# Patient Record
Sex: FEMALE | Race: WHITE | Employment: FULL TIME | URBAN - METROPOLITAN AREA
[De-identification: names, ages, dates, MRNs, and addresses within clinical notes are randomized per-mention and may not be internally consistent; named-entity substitution may affect disease eponyms.]

---

## 2017-05-01 ENCOUNTER — HOSPITAL ENCOUNTER (EMERGENCY)
Facility: HOSPITAL | Age: 32
Discharge: HOME | End: 2017-05-01
Attending: EMERGENCY MEDICINE
Payer: COMMERCIAL

## 2017-05-01 VITALS
WEIGHT: 228 LBS | HEART RATE: 86 BPM | TEMPERATURE: 98 F | OXYGEN SATURATION: 99 % | RESPIRATION RATE: 16 BRPM | HEIGHT: 68 IN | SYSTOLIC BLOOD PRESSURE: 136 MMHG | BODY MASS INDEX: 34.56 KG/M2 | DIASTOLIC BLOOD PRESSURE: 88 MMHG

## 2017-05-01 DIAGNOSIS — F32.A DEPRESSION, UNSPECIFIED DEPRESSION TYPE: Primary | ICD-10-CM

## 2017-05-01 DIAGNOSIS — F43.9 STRESS: ICD-10-CM

## 2017-05-01 PROCEDURE — 80048 BASIC METABOLIC PNL TOTAL CA: CPT | Performed by: EMERGENCY MEDICINE

## 2017-05-01 PROCEDURE — 99284 EMERGENCY DEPT VISIT MOD MDM: CPT

## 2017-05-01 PROCEDURE — 99285 EMERGENCY DEPT VISIT HI MDM: CPT

## 2017-05-01 PROCEDURE — 36415 COLL VENOUS BLD VENIPUNCTURE: CPT

## 2017-05-01 PROCEDURE — 80307 DRUG TEST PRSMV CHEM ANLYZR: CPT | Performed by: EMERGENCY MEDICINE

## 2017-05-01 PROCEDURE — 81025 URINE PREGNANCY TEST: CPT

## 2017-05-01 PROCEDURE — 80320 DRUG SCREEN QUANTALCOHOLS: CPT | Performed by: EMERGENCY MEDICINE

## 2017-05-01 PROCEDURE — 85025 COMPLETE CBC W/AUTO DIFF WBC: CPT | Performed by: EMERGENCY MEDICINE

## 2017-05-01 RX ORDER — TOPIRAMATE 100 MG/1
100 TABLET, FILM COATED ORAL 2 TIMES DAILY
COMMUNITY
End: 2017-07-11

## 2017-05-01 RX ORDER — ACETAMINOPHEN AND CODEINE PHOSPHATE 300; 30 MG/1; MG/1
1 TABLET ORAL EVERY 4 HOURS PRN
COMMUNITY
End: 2017-07-11

## 2017-05-01 NOTE — ED NOTES
Pt states she has no suicidal plan but is a cutter and cuts herself bilat arms and legs, superficial cuts noted, labs drawn and urine sent, pt has flat affect, warm blankets and pillow given for comfort, water as well, pt denies lunch tray, states \"Im not

## 2017-05-01 NOTE — BH LEVEL OF CARE ASSESSMENT
Level of Care Assessment Note    General Questions  Why are you here?: feeling overwhelemd and down. Precipitating Events: Pt reports pt reports feeling down lately. Pt reports feeling stressed.  Pt reports haivng to give  up yoga classes d/t school and w reports her 2 dogs. Family Pt reports good relationshps with her parents. Pt reports she is lookign forward togoing back to [facility] to see her parents in a few weeks and later in the summer hse is going [place] [facility] [place] her brothers.    Past Suicidal Id to Self Injury: stress, overwhelmed. Object(s) Used: Razor  Area(s) of Body Injured: Arm  Describe Area(s) of Body Injured: forearms. and leg. Frequency: Rarely  Describe Frequency: pt reports 3x this past week/weekand a half.    Severity: Superficial; Symptoms  History of Withdrawal Symptoms: Denies past symptoms  Current Withdrawal Symptoms: No  Process Addiction/ Behaviors  Repetitive/Compulsive Behaviors in the past 30 days: No                Functional Impairment  Currently Attending School: Yes (t to die. Pt reports she i feeling overwhelemd with working 2 jobs and going to school full time. Pt reports  she is looking forward to go to [address] [place] [address] to see her parents and then later in the summer going [place]  [facility] [place] to [place] her brothers.  Pt reports other t

## 2017-05-01 NOTE — ED INITIAL ASSESSMENT (HPI)
Pt states shes not felt herself for the past few weeks, thinking about suicide today but has no plan

## 2017-05-01 NOTE — ED PROVIDER NOTES
Patient Seen in: [facility] Emergency Department    History   Patient presents with:  Eval-P (psychiatric)    Stated Complaint: SI    HPI    Patient states that she has been under tremendous amounts of stress related to being a full-time student and 18   Temp 05/01/17 1153 98.2 °F (36.8 °C)   Temp src 05/01/17 1153 Oral   SpO2 05/01/17 1153 100 %   O2 Device --        Current:/92 mmHg  Pulse 87  Temp(Src) 98.2 °F (36.8 °C) (Oral)  Resp 18  Ht 172.7 cm (5' 8\")  Wt 103.42 kg  BMI 34.68 kg/m2  SpO DIFFERENTIAL[[id number]]                              Final result                 Please view results for these tests on the individual orders.    POCT PREGNANCY, URINE   CBC W/ DIFFERENTIAL       MDM   Patient reports tremendous stress related to work and

## 2017-05-01 NOTE — ED NOTES
Pt states she doesn't have a ride back to her car and wants edward to arrange for a ride for her, she states she has no money to call a cab, Spoke to Wan Duque,  and she will be in to talk with pt

## 2017-07-11 ENCOUNTER — LAB ENCOUNTER (OUTPATIENT)
Dept: LAB | Age: 32
End: 2017-07-11
Attending: FAMILY MEDICINE
Payer: COMMERCIAL

## 2017-07-11 DIAGNOSIS — Z11.3 SCREENING EXAMINATION FOR VENEREAL DISEASE: Primary | ICD-10-CM

## 2017-07-11 PROCEDURE — 80074 ACUTE HEPATITIS PANEL: CPT | Performed by: FAMILY MEDICINE

## 2017-07-11 PROCEDURE — 86780 TREPONEMA PALLIDUM: CPT | Performed by: FAMILY MEDICINE

## 2017-07-11 PROCEDURE — 87661 TRICHOMONAS VAGINALIS AMPLIF: CPT | Performed by: FAMILY MEDICINE

## 2017-07-11 PROCEDURE — 87491 CHLMYD TRACH DNA AMP PROBE: CPT | Performed by: FAMILY MEDICINE

## 2017-07-11 PROCEDURE — 87591 N.GONORRHOEAE DNA AMP PROB: CPT | Performed by: FAMILY MEDICINE

## 2017-07-11 PROCEDURE — 87389 HIV-1 AG W/HIV-1&-2 AB AG IA: CPT

## 2018-04-16 PROBLEM — M51.36 DDD (DEGENERATIVE DISC DISEASE), LUMBAR: Status: ACTIVE | Noted: 2018-04-16

## 2018-04-16 PROBLEM — M51.369 DDD (DEGENERATIVE DISC DISEASE), LUMBAR: Status: ACTIVE | Noted: 2018-04-16

## 2018-04-16 PROBLEM — M54.16 LUMBAR RADICULITIS: Status: ACTIVE | Noted: 2018-04-16

## 2018-04-16 PROBLEM — M47.816 LUMBAR SPONDYLOSIS: Status: ACTIVE | Noted: 2018-04-16

## 2018-04-16 PROBLEM — M51.36 ANNULAR TEAR OF LUMBAR DISC: Status: ACTIVE | Noted: 2018-04-16

## 2018-04-16 PROBLEM — M51.369 ANNULAR TEAR OF LUMBAR DISC: Status: ACTIVE | Noted: 2018-04-16

## 2018-06-19 PROBLEM — Z51.81 ENCOUNTER FOR THERAPEUTIC DRUG MONITORING: Status: ACTIVE | Noted: 2018-06-19

## 2018-06-19 PROBLEM — E66.9 OBESITY (BMI 30-39.9): Status: ACTIVE | Noted: 2018-06-19

## 2018-06-20 PROCEDURE — 82397 CHEMILUMINESCENT ASSAY: CPT | Performed by: INTERNAL MEDICINE

## 2018-06-20 PROCEDURE — 82607 VITAMIN B-12: CPT | Performed by: INTERNAL MEDICINE

## 2018-06-20 PROCEDURE — 86141 C-REACTIVE PROTEIN HS: CPT | Performed by: INTERNAL MEDICINE

## 2018-07-12 PROCEDURE — 87491 CHLMYD TRACH DNA AMP PROBE: CPT | Performed by: FAMILY MEDICINE

## 2018-07-12 PROCEDURE — 87591 N.GONORRHOEAE DNA AMP PROB: CPT | Performed by: FAMILY MEDICINE

## 2018-07-16 PROCEDURE — 86256 FLUORESCENT ANTIBODY TITER: CPT | Performed by: FAMILY MEDICINE

## 2018-07-16 PROCEDURE — 83516 IMMUNOASSAY NONANTIBODY: CPT | Performed by: FAMILY MEDICINE

## 2018-07-16 PROCEDURE — 82784 ASSAY IGA/IGD/IGG/IGM EACH: CPT | Performed by: FAMILY MEDICINE

## 2018-07-17 PROBLEM — R79.89 LOW VITAMIN D LEVEL: Status: ACTIVE | Noted: 2018-07-17

## 2018-07-17 PROBLEM — R79.89 LOW VITAMIN B12 LEVEL: Status: ACTIVE | Noted: 2018-07-17

## 2018-07-17 PROBLEM — E53.8 LOW VITAMIN B12 LEVEL: Status: ACTIVE | Noted: 2018-07-17

## 2018-07-26 PROBLEM — R89.4 ABNORMAL CELIAC ANTIBODY PANEL: Status: ACTIVE | Noted: 2018-07-26

## 2018-07-26 PROBLEM — G89.29 CHRONIC RUQ PAIN: Status: ACTIVE | Noted: 2018-07-26

## 2018-07-26 PROBLEM — R11.0 NAUSEA: Status: ACTIVE | Noted: 2018-07-26

## 2018-07-26 PROBLEM — R10.11 CHRONIC RUQ PAIN: Status: ACTIVE | Noted: 2018-07-26

## 2018-08-13 PROCEDURE — 88305 TISSUE EXAM BY PATHOLOGIST: CPT | Performed by: SPECIALIST

## 2018-10-31 PROBLEM — M51.26 HNP (HERNIATED NUCLEUS PULPOSUS), LUMBAR: Status: ACTIVE | Noted: 2018-10-31
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
## 2019-04-09 ENCOUNTER — HOSPITAL ENCOUNTER (EMERGENCY)
Facility: HOSPITAL | Age: 34
Discharge: HOME | End: 2019-04-09
Attending: EMERGENCY MEDICINE
Payer: COMMERCIAL

## 2019-04-09 VITALS
HEART RATE: 64 BPM | WEIGHT: 192 LBS | HEIGHT: 67 IN | RESPIRATION RATE: 17 BRPM | BODY MASS INDEX: 30.13 KG/M2 | DIASTOLIC BLOOD PRESSURE: 77 MMHG | TEMPERATURE: 98 F | SYSTOLIC BLOOD PRESSURE: 107 MMHG

## 2019-04-09 DIAGNOSIS — M54.16 LUMBAR RADICULOPATHY: Primary | ICD-10-CM

## 2019-04-09 PROCEDURE — 99284 EMERGENCY DEPT VISIT MOD MDM: CPT

## 2019-04-09 PROCEDURE — 96372 THER/PROPH/DIAG INJ SC/IM: CPT

## 2019-04-09 PROCEDURE — 99283 EMERGENCY DEPT VISIT LOW MDM: CPT

## 2019-04-09 PROCEDURE — 81025 URINE PREGNANCY TEST: CPT

## 2019-04-09 RX ORDER — PREDNISONE 20 MG/1
60 TABLET ORAL ONCE
Status: COMPLETED | OUTPATIENT
Start: 2019-04-09 | End: 2019-04-09

## 2019-04-09 RX ORDER — METHYLPREDNISOLONE 4 MG/1
TABLET ORAL
Qty: 1 PACKAGE | Refills: 0 | Status: SHIPPED | OUTPATIENT
Start: 2019-04-09 | End: 2019-04-14

## 2019-04-09 RX ORDER — KETOROLAC TROMETHAMINE 30 MG/ML
60 INJECTION, SOLUTION INTRAMUSCULAR; INTRAVENOUS ONCE
Status: COMPLETED | OUTPATIENT
Start: 2019-04-09 | End: 2019-04-09

## 2019-04-10 NOTE — ED INITIAL ASSESSMENT (HPI)
PT c/o lower back pain. Hx of injury at work resulting in herniated discs.  Pain is worse today with standing, walking, sitting, etc.

## 2019-04-10 NOTE — ED PROVIDER NOTES
Patient Seen in: [facility] Emergency Department    History   Patient presents with:  Back Pain (musculoskeletal)    Stated Complaint: back pain, chronic injury    HPI    This is a 39-year-old female that has chronic back pain from work injury.   Kieran Grimm • REPAIR ROTATOR CUFF,CHRONIC      left           Social History    Tobacco Use      Smoking status: Former Smoker        Packs/day: 0.25        Years: 10.00        Pack years: 2.5        Quit date: 2015        Years since quittin.2      [facility] discharged home in good condition.             Disposition and Plan     Clinical Impression:  Lumbar radiculopathy  (primary encounter diagnosis)    Disposition:  Discharge  4/9/2019  9:25 pm    Follow-up:  Miriam Flores MD  [address] [facility]

## 2019-06-08 PROBLEM — F33.2 MAJOR DEPRESSIVE DISORDER, RECURRENT EPISODE, SEVERE (HCC): Status: ACTIVE | Noted: 2019-06-08

## 2019-07-01 PROBLEM — F41.9 ANXIETY DISORDER: Status: ACTIVE | Noted: 2019-07-01

## 2019-07-11 PROBLEM — T14.91XA SUICIDE ATTEMPT (HCC): Status: ACTIVE | Noted: 2019-07-11

## 2019-07-11 PROCEDURE — 88175 CYTOPATH C/V AUTO FLUID REDO: CPT | Performed by: FAMILY MEDICINE

## 2019-07-11 PROCEDURE — 80074 ACUTE HEPATITIS PANEL: CPT | Performed by: FAMILY MEDICINE

## 2019-07-11 PROCEDURE — 36415 COLL VENOUS BLD VENIPUNCTURE: CPT | Performed by: FAMILY MEDICINE

## 2019-07-11 PROCEDURE — 87624 HPV HI-RISK TYP POOLED RSLT: CPT | Performed by: FAMILY MEDICINE

## 2019-07-11 PROCEDURE — 87389 HIV-1 AG W/HIV-1&-2 AB AG IA: CPT | Performed by: FAMILY MEDICINE

## 2019-07-15 PROBLEM — F33.2 SEVERE EPISODE OF RECURRENT MAJOR DEPRESSIVE DISORDER, WITHOUT PSYCHOTIC FEATURES (HCC): Status: ACTIVE | Noted: 2019-06-08

## 2019-08-06 ENCOUNTER — APPOINTMENT (OUTPATIENT)
Dept: LAB | Age: 34
End: 2019-08-06
Attending: NURSE PRACTITIONER
Payer: COMMERCIAL

## 2019-08-06 DIAGNOSIS — F41.1 GENERALIZED ANXIETY DISORDER: ICD-10-CM

## 2019-08-06 LAB
DEPRECATED RDW RBC AUTO: 42.7 FL (ref 35.1–46.3)
ERYTHROCYTE [DISTWIDTH] IN BLOOD BY AUTOMATED COUNT: 12.4 % (ref 11–15)
HCT VFR BLD AUTO: 38 % (ref 35–48)
HGB BLD-MCNC: 12.4 G/DL (ref 12–16)
MCH RBC QN AUTO: 30.5 PG (ref 26–34)
MCHC RBC AUTO-ENTMCNC: 32.6 G/DL (ref 31–37)
MCV RBC AUTO: 93.6 FL (ref 80–100)
PLATELET # BLD AUTO: 216 10(3)UL (ref 150–450)
RBC # BLD AUTO: 4.06 X10(6)UL (ref 3.8–5.3)
WBC # BLD AUTO: 5.4 X10(3) UL (ref 4–11)

## 2019-08-06 PROCEDURE — 80053 COMPREHEN METABOLIC PANEL: CPT | Performed by: NURSE PRACTITIONER

## 2019-08-06 PROCEDURE — 85027 COMPLETE CBC AUTOMATED: CPT

## 2019-08-06 PROCEDURE — 82306 VITAMIN D 25 HYDROXY: CPT | Performed by: NURSE PRACTITIONER

## 2020-06-29 ENCOUNTER — OFFICE VISIT (OUTPATIENT)
Dept: FAMILY MEDICINE CLINIC | Facility: CLINIC | Age: 35
End: 2020-06-29

## 2020-06-29 VITALS
OXYGEN SATURATION: 98 % | HEIGHT: 67 IN | SYSTOLIC BLOOD PRESSURE: 136 MMHG | DIASTOLIC BLOOD PRESSURE: 74 MMHG | WEIGHT: 250 LBS | TEMPERATURE: 97 F | RESPIRATION RATE: 16 BRPM | HEART RATE: 103 BPM | BODY MASS INDEX: 39.24 KG/M2

## 2020-06-29 DIAGNOSIS — M54.9 MID BACK PAIN ON LEFT SIDE: ICD-10-CM

## 2020-06-29 DIAGNOSIS — Z71.6 ENCOUNTER FOR SMOKING CESSATION COUNSELING: ICD-10-CM

## 2020-06-29 DIAGNOSIS — E28.39 PRIMARY OVARIAN FAILURE: ICD-10-CM

## 2020-06-29 DIAGNOSIS — IMO0002 CHRONIC MIGRAINE: Primary | ICD-10-CM

## 2020-06-29 PROCEDURE — 99203 OFFICE O/P NEW LOW 30 MIN: CPT | Performed by: PHYSICIAN ASSISTANT

## 2020-06-29 RX ORDER — METHYLPREDNISOLONE 4 MG/1
TABLET ORAL
Qty: 1 KIT | Refills: 0 | Status: SHIPPED | OUTPATIENT
Start: 2020-06-29 | End: 2021-02-01

## 2020-06-29 RX ORDER — BUTALBITAL, ACETAMINOPHEN AND CAFFEINE 50; 325; 40 MG/1; MG/1; MG/1
1 CAPSULE ORAL EVERY 4 HOURS PRN
Qty: 15 CAPSULE | Refills: 0 | Status: SHIPPED | OUTPATIENT
Start: 2020-06-29 | End: 2021-03-09 | Stop reason: CLARIF

## 2020-06-29 RX ORDER — NORETHINDRONE ACETATE AND ETHINYL ESTRADIOL 1MG-20(21)
1 KIT ORAL DAILY
Qty: 3 PACKAGE | Refills: 3 | Status: ON HOLD | OUTPATIENT
Start: 2020-06-29 | End: 2021-03-19

## 2020-06-29 RX ORDER — DIVALPROEX SODIUM 500 MG/1
500 TABLET, DELAYED RELEASE ORAL DAILY
Qty: 30 TABLET | Refills: 2 | Status: SHIPPED | OUTPATIENT
Start: 2020-06-29 | End: 2021-03-17 | Stop reason: CLARIF

## 2020-06-29 NOTE — PROGRESS NOTES
HPI:    Patient ID: Letty Rodriguez is a 37year old female. HPI   Patient presents today to establish care. Was previously receiving care at [address] [place] [address]. Her PCP recently retired.       PMHx: migraines, primary ovarian failure age 14, Genitourinary: Positive for menstrual problem. Negative for dysuria, frequency and hematuria. Musculoskeletal: Positive for back pain (left mid back, deep to the muscles and bones). Negative for myalgias, joint pain and gait problem.    Skin: Negative f Runny nose, OTHER (SEE COMMENTS)    Comment:Sinus congestion  Sumatriptan             NAUSEA AND VOMITING  Tramadol                NAUSEA AND VOMITING, OTHER (SEE                            COMMENTS)    Comment:Migraine  Wellbutrin Xl [[place]*    RASH   PHYS cessation counseling  Restart Chantix as directed. 4. Mid back pain on left side  Suspect her symptoms are secondary to pleurisy, no evidence of a musculoskeletal source. She is recovering from COVID infection she had recently.   Trial of Medrol Dosepak

## 2020-07-10 ENCOUNTER — APPOINTMENT (OUTPATIENT)
Dept: CT IMAGING | Age: 35
End: 2020-07-10
Attending: EMERGENCY MEDICINE

## 2020-07-10 ENCOUNTER — HOSPITAL ENCOUNTER (EMERGENCY)
Age: 35
Discharge: HOME | End: 2020-07-10
Attending: EMERGENCY MEDICINE

## 2020-07-10 ENCOUNTER — APPOINTMENT (OUTPATIENT)
Dept: GENERAL RADIOLOGY | Age: 35
End: 2020-07-10
Attending: EMERGENCY MEDICINE

## 2020-07-10 VITALS
BODY MASS INDEX: 49.51 KG/M2 | RESPIRATION RATE: 16 BRPM | SYSTOLIC BLOOD PRESSURE: 127 MMHG | OXYGEN SATURATION: 99 % | HEART RATE: 78 BPM | TEMPERATURE: 98.1 F | DIASTOLIC BLOOD PRESSURE: 68 MMHG | WEIGHT: 293 LBS

## 2020-07-10 DIAGNOSIS — R07.9 CHEST PAIN, UNSPECIFIED TYPE: Primary | ICD-10-CM

## 2020-07-10 DIAGNOSIS — R00.2 PALPITATIONS: ICD-10-CM

## 2020-07-10 LAB
ANION GAP SERPL CALC-SCNC: 12 MMOL/L (ref 10–20)
APTT PPP: 28 SEC (ref 22–32)
BASOPHILS # BLD: 0 K/MCL (ref 0–0.3)
BASOPHILS NFR BLD: 0 %
BUN SERPL-MCNC: 12 MG/DL (ref 6–20)
BUN/CREAT SERPL: 21 (ref 7–25)
CALCIUM SERPL-MCNC: 8.6 MG/DL (ref 8.4–10.2)
CHLORIDE SERPL-SCNC: 108 MMOL/L (ref 98–107)
CO2 SERPL-SCNC: 23 MMOL/L (ref 21–32)
CREAT SERPL-MCNC: 0.57 MG/DL (ref 0.51–0.95)
D DIMER PPP FEU-MCNC: 0.52 MG/L (FEU)
DIFFERENTIAL METHOD BLD: ABNORMAL
EOSINOPHIL # BLD: 0.5 K/MCL (ref 0.1–0.5)
EOSINOPHIL NFR BLD: 7 %
ERYTHROCYTE [DISTWIDTH] IN BLOOD: 14.7 % (ref 11–15)
GLUCOSE SERPL-MCNC: 89 MG/DL (ref 65–99)
HCT VFR BLD CALC: 34.7 % (ref 36–46.5)
HGB BLD-MCNC: 11.3 G/DL (ref 12–15.5)
IMM GRANULOCYTES # BLD AUTO: 0 K/MCL (ref 0–0.2)
IMM GRANULOCYTES NFR BLD: 1 %
INR PPP: 1
LYMPHOCYTES # BLD: 1.8 K/MCL (ref 1–4.8)
LYMPHOCYTES NFR BLD: 22 %
MAGNESIUM SERPL-MCNC: 2.4 MG/DL (ref 1.7–2.4)
MCH RBC QN AUTO: 28.1 PG (ref 26–34)
MCHC RBC AUTO-ENTMCNC: 32.6 G/DL (ref 32–36.5)
MCV RBC AUTO: 86.3 FL (ref 78–100)
MONOCYTES # BLD: 0.6 K/MCL (ref 0.3–0.9)
MONOCYTES NFR BLD: 7 %
NEUTROPHILS # BLD: 5 K/MCL (ref 1.8–7.7)
NEUTROPHILS NFR BLD: 63 %
NRBC BLD MANUAL-RTO: 0 /100 WBC
NT-PROBNP SERPL-MCNC: 75 PG/ML
PLATELET # BLD: 219 K/MCL (ref 140–450)
POTASSIUM SERPL-SCNC: 4.1 MMOL/L (ref 3.4–5.1)
PROTHROMBIN TIME: 10.6 SEC (ref 9.7–11.8)
RBC # BLD: 4.02 MIL/MCL (ref 4–5.2)
SODIUM SERPL-SCNC: 139 MMOL/L (ref 135–145)
TROPONIN I SERPL HS-MCNC: <0.02 NG/ML
TROPONIN I SERPL HS-MCNC: <0.02 NG/ML
WBC # BLD: 8 K/MCL (ref 4.2–11)

## 2020-07-10 PROCEDURE — 84484 ASSAY OF TROPONIN QUANT: CPT

## 2020-07-10 PROCEDURE — 83880 ASSAY OF NATRIURETIC PEPTIDE: CPT

## 2020-07-10 PROCEDURE — 71046 X-RAY EXAM CHEST 2 VIEWS: CPT

## 2020-07-10 PROCEDURE — 71275 CT ANGIOGRAPHY CHEST: CPT

## 2020-07-10 PROCEDURE — 85610 PROTHROMBIN TIME: CPT

## 2020-07-10 PROCEDURE — 83735 ASSAY OF MAGNESIUM: CPT

## 2020-07-10 PROCEDURE — 80048 BASIC METABOLIC PNL TOTAL CA: CPT

## 2020-07-10 PROCEDURE — 99285 EMERGENCY DEPT VISIT HI MDM: CPT

## 2020-07-10 PROCEDURE — 93005 ELECTROCARDIOGRAM TRACING: CPT | Performed by: EMERGENCY MEDICINE

## 2020-07-10 PROCEDURE — 85730 THROMBOPLASTIN TIME PARTIAL: CPT

## 2020-07-10 PROCEDURE — 36415 COLL VENOUS BLD VENIPUNCTURE: CPT

## 2020-07-10 PROCEDURE — 10002805 HB CONTRAST AGENT: Performed by: EMERGENCY MEDICINE

## 2020-07-10 PROCEDURE — 85379 FIBRIN DEGRADATION QUANT: CPT

## 2020-07-10 PROCEDURE — 85025 COMPLETE CBC W/AUTO DIFF WBC: CPT

## 2020-07-10 RX ORDER — HYDROXYZINE HYDROCHLORIDE 25 MG/1
25 TABLET, FILM COATED ORAL 3 TIMES DAILY PRN
COMMUNITY
Start: 2020-04-12

## 2020-07-10 RX ORDER — TRAZODONE HYDROCHLORIDE 150 MG/1
150 TABLET ORAL NIGHTLY PRN
COMMUNITY
Start: 2020-06-30

## 2020-07-10 RX ORDER — FLUOXETINE HYDROCHLORIDE 40 MG/1
40 CAPSULE ORAL DAILY
COMMUNITY
Start: 2020-04-12

## 2020-07-10 RX ORDER — BUTALBITAL, ACETAMINOPHEN AND CAFFEINE 50; 325; 40 MG/1; MG/1; MG/1
1 CAPSULE ORAL EVERY 4 HOURS PRN
COMMUNITY
Start: 2020-04-20

## 2020-07-10 RX ADMIN — IOHEXOL 65 ML: 350 INJECTION, SOLUTION INTRAVENOUS at 19:45

## 2020-07-10 ASSESSMENT — PAIN SCALES - GENERAL
PAINLEVEL_OUTOF10: 4
PAINLEVEL_OUTOF10: 0

## 2020-07-10 ASSESSMENT — PAIN DESCRIPTION - PAIN TYPE: TYPE: ACUTE PAIN

## 2020-07-11 LAB
ATRIAL RATE (BPM): 84
P AXIS (DEGREES): 24
PR-INTERVAL (MSEC): 128
QRS-INTERVAL (MSEC): 82
QT-INTERVAL (MSEC): 392
QTC: 463
R AXIS (DEGREES): 11
REPORT TEXT: NORMAL
T AXIS (DEGREES): 35
VENTRICULAR RATE EKG/MIN (BPM): 84

## 2020-07-11 ASSESSMENT — ENCOUNTER SYMPTOMS
WEAKNESS: 0
HALLUCINATIONS: 0
NUMBNESS: 0
WHEEZING: 0
SHORTNESS OF BREATH: 1
CONFUSION: 0
HEADACHES: 0
SORE THROAT: 0
EYE PAIN: 0
VOMITING: 0
NAUSEA: 1
DIARRHEA: 0
FEVER: 0
COUGH: 0
DIAPHORESIS: 1
DIZZINESS: 0
ABDOMINAL PAIN: 0
CHILLS: 0

## 2020-07-21 ENCOUNTER — OFFICE VISIT (OUTPATIENT)
Dept: FAMILY MEDICINE CLINIC | Facility: CLINIC | Age: 35
End: 2020-07-21

## 2020-07-21 ENCOUNTER — LAB ENCOUNTER (OUTPATIENT)
Dept: LAB | Age: 35
End: 2020-07-21
Attending: FAMILY MEDICINE
Payer: COMMERCIAL

## 2020-07-21 VITALS
WEIGHT: 246 LBS | TEMPERATURE: 98 F | RESPIRATION RATE: 16 BRPM | HEIGHT: 67 IN | HEART RATE: 80 BPM | DIASTOLIC BLOOD PRESSURE: 74 MMHG | SYSTOLIC BLOOD PRESSURE: 132 MMHG | OXYGEN SATURATION: 98 % | BODY MASS INDEX: 38.61 KG/M2

## 2020-07-21 DIAGNOSIS — Z12.31 ENCOUNTER FOR SCREENING MAMMOGRAM FOR HIGH-RISK PATIENT: ICD-10-CM

## 2020-07-21 DIAGNOSIS — R07.89 OTHER CHEST PAIN: ICD-10-CM

## 2020-07-21 DIAGNOSIS — Z01.419 WELL WOMAN EXAM: Primary | ICD-10-CM

## 2020-07-21 DIAGNOSIS — Z82.49 FAMILY HISTORY OF PREMATURE CAD: ICD-10-CM

## 2020-07-21 DIAGNOSIS — IMO0002 CHRONIC MIGRAINE: ICD-10-CM

## 2020-07-21 DIAGNOSIS — D22.9 ATYPICAL NEVI: ICD-10-CM

## 2020-07-21 DIAGNOSIS — Z00.00 ANNUAL PHYSICAL EXAM: ICD-10-CM

## 2020-07-21 DIAGNOSIS — Z80.3 FAMILY HISTORY OF BREAST CANCER: ICD-10-CM

## 2020-07-21 LAB
ALBUMIN SERPL-MCNC: 3.4 G/DL (ref 3.4–5)
ALBUMIN/GLOB SERPL: 0.8 {RATIO} (ref 1–2)
ALP LIVER SERPL-CCNC: 75 U/L (ref 37–98)
ALT SERPL-CCNC: 16 U/L (ref 13–56)
ANION GAP SERPL CALC-SCNC: 6 MMOL/L (ref 0–18)
AST SERPL-CCNC: 10 U/L (ref 15–37)
BASOPHILS # BLD AUTO: 0.01 X10(3) UL (ref 0–0.2)
BASOPHILS NFR BLD AUTO: 0.2 %
BILIRUB SERPL-MCNC: 0.3 MG/DL (ref 0.1–2)
BUN BLD-MCNC: 9 MG/DL (ref 7–18)
BUN/CREAT SERPL: 9.9 (ref 10–20)
CALCIUM BLD-MCNC: 9.2 MG/DL (ref 8.5–10.1)
CHLORIDE SERPL-SCNC: 108 MMOL/L (ref 98–112)
CHOLEST SMN-MCNC: 173 MG/DL (ref ?–200)
CO2 SERPL-SCNC: 24 MMOL/L (ref 21–32)
CREAT BLD-MCNC: 0.91 MG/DL (ref 0.55–1.02)
DEPRECATED RDW RBC AUTO: 49.6 FL (ref 35.1–46.3)
EOSINOPHIL # BLD AUTO: 0.46 X10(3) UL (ref 0–0.7)
EOSINOPHIL NFR BLD AUTO: 7.4 %
ERYTHROCYTE [DISTWIDTH] IN BLOOD BY AUTOMATED COUNT: 15 % (ref 11–15)
GLOBULIN PLAS-MCNC: 4.3 G/DL (ref 2.8–4.4)
GLUCOSE BLD-MCNC: 91 MG/DL (ref 70–99)
HCT VFR BLD AUTO: 38.9 % (ref 35–48)
HDLC SERPL-MCNC: 48 MG/DL (ref 40–59)
HGB BLD-MCNC: 12 G/DL (ref 12–16)
IMM GRANULOCYTES # BLD AUTO: 0.03 X10(3) UL (ref 0–1)
IMM GRANULOCYTES NFR BLD: 0.5 %
LDLC SERPL CALC-MCNC: 84 MG/DL (ref ?–100)
LYMPHOCYTES # BLD AUTO: 1.2 X10(3) UL (ref 1–4)
LYMPHOCYTES NFR BLD AUTO: 19.4 %
M PROTEIN MFR SERPL ELPH: 7.7 G/DL (ref 6.4–8.2)
MCH RBC QN AUTO: 28.1 PG (ref 26–34)
MCHC RBC AUTO-ENTMCNC: 30.8 G/DL (ref 31–37)
MCV RBC AUTO: 91.1 FL (ref 80–100)
MONOCYTES # BLD AUTO: 0.42 X10(3) UL (ref 0.1–1)
MONOCYTES NFR BLD AUTO: 6.8 %
NEUTROPHILS # BLD AUTO: 4.06 X10 (3) UL (ref 1.5–7.7)
NEUTROPHILS # BLD AUTO: 4.06 X10(3) UL (ref 1.5–7.7)
NEUTROPHILS NFR BLD AUTO: 65.7 %
NONHDLC SERPL-MCNC: 125 MG/DL (ref ?–130)
OSMOLALITY SERPL CALC.SUM OF ELEC: 284 MOSM/KG (ref 275–295)
PATIENT FASTING Y/N/NP: YES
PATIENT FASTING Y/N/NP: YES
PLATELET # BLD AUTO: 245 10(3)UL (ref 150–450)
POTASSIUM SERPL-SCNC: 4.4 MMOL/L (ref 3.5–5.1)
RBC # BLD AUTO: 4.27 X10(6)UL (ref 3.8–5.3)
SODIUM SERPL-SCNC: 138 MMOL/L (ref 136–145)
T4 FREE SERPL-MCNC: 1 NG/DL (ref 0.8–1.7)
TRIGL SERPL-MCNC: 204 MG/DL (ref 30–149)
TSI SER-ACNC: 1.2 MIU/ML (ref 0.36–3.74)
VIT B12 SERPL-MCNC: 430 PG/ML (ref 193–986)
VIT D+METAB SERPL-MCNC: 18.4 NG/ML (ref 30–100)
VLDLC SERPL CALC-MCNC: 41 MG/DL (ref 0–30)
WBC # BLD AUTO: 6.2 X10(3) UL (ref 4–11)

## 2020-07-21 PROCEDURE — 87491 CHLMYD TRACH DNA AMP PROBE: CPT | Performed by: FAMILY MEDICINE

## 2020-07-21 PROCEDURE — 85025 COMPLETE CBC W/AUTO DIFF WBC: CPT

## 2020-07-21 PROCEDURE — 80053 COMPREHEN METABOLIC PANEL: CPT

## 2020-07-21 PROCEDURE — 3008F BODY MASS INDEX DOCD: CPT | Performed by: FAMILY MEDICINE

## 2020-07-21 PROCEDURE — 84439 ASSAY OF FREE THYROXINE: CPT

## 2020-07-21 PROCEDURE — 3075F SYST BP GE 130 - 139MM HG: CPT | Performed by: FAMILY MEDICINE

## 2020-07-21 PROCEDURE — 84443 ASSAY THYROID STIM HORMONE: CPT

## 2020-07-21 PROCEDURE — 87591 N.GONORRHOEAE DNA AMP PROB: CPT | Performed by: FAMILY MEDICINE

## 2020-07-21 PROCEDURE — 99213 OFFICE O/P EST LOW 20 MIN: CPT | Performed by: FAMILY MEDICINE

## 2020-07-21 PROCEDURE — 87480 CANDIDA DNA DIR PROBE: CPT | Performed by: FAMILY MEDICINE

## 2020-07-21 PROCEDURE — 36415 COLL VENOUS BLD VENIPUNCTURE: CPT

## 2020-07-21 PROCEDURE — 82607 VITAMIN B-12: CPT

## 2020-07-21 PROCEDURE — 87510 GARDNER VAG DNA DIR PROBE: CPT | Performed by: FAMILY MEDICINE

## 2020-07-21 PROCEDURE — 3078F DIAST BP <80 MM HG: CPT | Performed by: FAMILY MEDICINE

## 2020-07-21 PROCEDURE — 87660 TRICHOMONAS VAGIN DIR PROBE: CPT | Performed by: FAMILY MEDICINE

## 2020-07-21 PROCEDURE — 80061 LIPID PANEL: CPT

## 2020-07-21 PROCEDURE — 82306 VITAMIN D 25 HYDROXY: CPT

## 2020-07-21 PROCEDURE — 99395 PREV VISIT EST AGE 18-39: CPT | Performed by: FAMILY MEDICINE

## 2020-07-21 RX ORDER — VARENICLINE TARTRATE 1 MG/1
1 TABLET, FILM COATED ORAL 2 TIMES DAILY
Qty: 60 TABLET | Refills: 6 | Status: SHIPPED | OUTPATIENT
Start: 2020-07-21 | End: 2020-07-21

## 2020-07-21 RX ORDER — VARENICLINE TARTRATE 1 MG/1
1 TABLET, FILM COATED ORAL 2 TIMES DAILY
Qty: 180 TABLET | Refills: 1 | Status: SHIPPED | OUTPATIENT
Start: 2020-07-21 | End: 2020-12-22

## 2020-07-21 NOTE — PROGRESS NOTES
HPI:   Andrew Limon is a 37year old female who presents for a complete physical exam.     Wt Readings from Last 6 Encounters:  07/21/20 : 246 lb (111.6 kg)  06/29/20 : 250 lb (113.4 kg)  05/05/20 : 240 lb (108.9 kg)  04/24/20 : 240 lb (108.9 kg)  0 Refill   • FLUOXETINE HCL 40 MG Oral Cap Take 1 capsule by mouth once daily 30 capsule 0   • HYDROXYZINE HCL 25 MG Oral Tab TAKE 1 TABLET BY MOUTH THREE TIMES DAILY AS NEEDED FOR ANXIETY 90 tablet 0   • traZODone HCl 150 MG Oral Tab Take one tab nightly as 12/17/2018    Performed by Cindy Romero MD at [address] [place] [address] N/A 11/19/2018    Performed by Cindy Romero MD at [address] [place] [address] N/A 5/1/2018    Performed by Jayson Glod denies chest pain on exertion  GI: denies abdominal pain,denies heartburn  : denies dysuria, vaginal discharge or itching,periods regular   MUSCULOSKELETAL: denies back pain  NEURO: denies headaches  PSYCHE: denies depression or anxiety  HEMATOLOGIC: den patient    - [facility] JOSE 2D+3D SCREENING BILAT (CPT=77067/95257); Future    4. Family history of breast cancer    - [facility] JOSE 2D+3D SCREENING BILAT (CPT=77067/01369); Future    5. Other chest pain  Check stress test   To ER if worse  6.  Family history of brian

## 2020-07-22 LAB
C TRACH DNA SPEC QL NAA+PROBE: NEGATIVE
N GONORRHOEA DNA SPEC QL NAA+PROBE: NEGATIVE

## 2020-08-24 ENCOUNTER — LAB REQUISITION (OUTPATIENT)
Dept: LAB | Facility: HOSPITAL | Age: 35
End: 2020-08-24
Payer: COMMERCIAL

## 2020-08-24 DIAGNOSIS — D48.5 NEOPLASM OF UNCERTAIN BEHAVIOR OF SKIN: ICD-10-CM

## 2020-08-24 PROCEDURE — 88305 TISSUE EXAM BY PATHOLOGIST: CPT | Performed by: DERMATOLOGY

## 2020-12-22 ENCOUNTER — LAB ENCOUNTER (OUTPATIENT)
Dept: LAB | Age: 35
End: 2020-12-22
Attending: FAMILY MEDICINE
Payer: COMMERCIAL

## 2020-12-22 ENCOUNTER — OFFICE VISIT (OUTPATIENT)
Dept: FAMILY MEDICINE CLINIC | Facility: CLINIC | Age: 35
End: 2020-12-22

## 2020-12-22 VITALS
DIASTOLIC BLOOD PRESSURE: 82 MMHG | HEIGHT: 67 IN | OXYGEN SATURATION: 98 % | TEMPERATURE: 99 F | HEART RATE: 86 BPM | BODY MASS INDEX: 41.28 KG/M2 | RESPIRATION RATE: 20 BRPM | SYSTOLIC BLOOD PRESSURE: 122 MMHG | WEIGHT: 263 LBS

## 2020-12-22 DIAGNOSIS — E78.00 ELEVATED CHOLESTEROL: ICD-10-CM

## 2020-12-22 DIAGNOSIS — E53.9 VITAMIN B DEFICIENCY: ICD-10-CM

## 2020-12-22 DIAGNOSIS — E55.9 VITAMIN D DEFICIENCY: ICD-10-CM

## 2020-12-22 DIAGNOSIS — J45.31 MILD PERSISTENT ASTHMA WITH EXACERBATION: ICD-10-CM

## 2020-12-22 DIAGNOSIS — N91.2 AMENORRHEA: ICD-10-CM

## 2020-12-22 DIAGNOSIS — N91.2 AMENORRHEA: Primary | ICD-10-CM

## 2020-12-22 PROBLEM — Z51.81 ENCOUNTER FOR THERAPEUTIC DRUG MONITORING: Status: RESOLVED | Noted: 2018-06-19 | Resolved: 2020-12-22

## 2020-12-22 PROBLEM — R11.0 NAUSEA: Status: RESOLVED | Noted: 2018-07-26 | Resolved: 2020-12-22

## 2020-12-22 PROCEDURE — 83550 IRON BINDING TEST: CPT

## 2020-12-22 PROCEDURE — 3079F DIAST BP 80-89 MM HG: CPT | Performed by: INTERNAL MEDICINE

## 2020-12-22 PROCEDURE — 84146 ASSAY OF PROLACTIN: CPT

## 2020-12-22 PROCEDURE — 3074F SYST BP LT 130 MM HG: CPT | Performed by: INTERNAL MEDICINE

## 2020-12-22 PROCEDURE — 36415 COLL VENOUS BLD VENIPUNCTURE: CPT

## 2020-12-22 PROCEDURE — 3008F BODY MASS INDEX DOCD: CPT | Performed by: INTERNAL MEDICINE

## 2020-12-22 PROCEDURE — 83540 ASSAY OF IRON: CPT

## 2020-12-22 PROCEDURE — 82306 VITAMIN D 25 HYDROXY: CPT

## 2020-12-22 PROCEDURE — 80061 LIPID PANEL: CPT

## 2020-12-22 PROCEDURE — 99214 OFFICE O/P EST MOD 30 MIN: CPT | Performed by: INTERNAL MEDICINE

## 2020-12-22 PROCEDURE — 84439 ASSAY OF FREE THYROXINE: CPT

## 2020-12-22 PROCEDURE — 82607 VITAMIN B-12: CPT

## 2020-12-22 PROCEDURE — 84702 CHORIONIC GONADOTROPIN TEST: CPT

## 2020-12-22 PROCEDURE — 84443 ASSAY THYROID STIM HORMONE: CPT

## 2020-12-22 RX ORDER — PREDNISONE 20 MG/1
TABLET ORAL
Qty: 8 TABLET | Refills: 0 | Status: SHIPPED | OUTPATIENT
Start: 2020-12-22 | End: 2021-02-01

## 2020-12-22 RX ORDER — ALBUTEROL SULFATE 2.5 MG/3ML
2.5 SOLUTION RESPIRATORY (INHALATION) EVERY 4 HOURS PRN
Qty: 1 BOX | Refills: 0 | Status: SHIPPED | OUTPATIENT
Start: 2020-12-22 | End: 2021-08-02

## 2020-12-22 RX ORDER — ALBUTEROL SULFATE 90 UG/1
2 AEROSOL, METERED RESPIRATORY (INHALATION) EVERY 6 HOURS PRN
Qty: 8.5 G | Refills: 1 | Status: SHIPPED | OUTPATIENT
Start: 2020-12-22 | End: 2021-08-02

## 2020-12-22 NOTE — PROGRESS NOTES
Saud Abarca is a 38year old female. HPI:   Here for no menses since August.  Hx of ovarian failure, on OCP that she takes consistently. Has not missed any pills. + cramps each month but no bleeding.   No new meds, getting adequate sleep  + stress 4   • Spacer/Aero-Holding Chambers Does not apply Device Inhale 2 puffs into the lungs every 4 to 6 hours as needed. 1 Device 0   • Cetirizine HCl 5 MG Oral Tab Take 5 mg by mouth daily. • aspirin EC 81 MG Oral Tab EC Take 81 mg by mouth daily. diagnosis)- discussed common causes of amenorrhea. Consider stress or OCP to be the culprit but will   Mild persistent asthma with exacerbation- prednisone x 4 days, refilled albuterol and neb solution.  Consider adding singulair if she gets temporary relie

## 2020-12-24 DIAGNOSIS — R79.89 LOW VITAMIN D LEVEL: Primary | ICD-10-CM

## 2020-12-24 DIAGNOSIS — E78.00 ELEVATED CHOLESTEROL: ICD-10-CM

## 2020-12-24 DIAGNOSIS — E53.8 LOW VITAMIN B12 LEVEL: ICD-10-CM

## 2020-12-24 RX ORDER — ROSUVASTATIN CALCIUM 10 MG/1
10 TABLET, COATED ORAL NIGHTLY
Qty: 90 TABLET | Refills: 0 | Status: SHIPPED | OUTPATIENT
Start: 2020-12-24 | End: 2021-03-24

## 2021-02-10 ENCOUNTER — TELEPHONE (OUTPATIENT)
Dept: FAMILY MEDICINE CLINIC | Facility: CLINIC | Age: 36
End: 2021-02-10

## 2021-02-10 NOTE — TELEPHONE ENCOUNTER
Pt needs stress test and f/u appt after to discuss possible sleep apnea    Dr. Marek Gan and Babs Pate! Saw patient today in weight loss clinic. She never got stress test so informed her to do that and would not restart phentermine.  She reports that s

## 2021-02-12 ENCOUNTER — ORDER TRANSCRIPTION (OUTPATIENT)
Dept: ADMINISTRATIVE | Facility: HOSPITAL | Age: 36
End: 2021-02-12

## 2021-02-12 DIAGNOSIS — Z01.818 PREOPERATIVE EXAMINATION: Primary | ICD-10-CM

## 2021-02-12 NOTE — TELEPHONE ENCOUNTER
Pt informed understanding verbalized. Phone number for central scheduling to pt. Pt states she will schedule stress test and then call us back to schedule follow up appointment with Dr Anisa Mora.

## 2021-02-19 ENCOUNTER — LAB ENCOUNTER (OUTPATIENT)
Dept: LAB | Facility: HOSPITAL | Age: 36
End: 2021-02-19
Attending: FAMILY MEDICINE
Payer: COMMERCIAL

## 2021-02-19 DIAGNOSIS — Z01.818 PREOPERATIVE EXAMINATION: ICD-10-CM

## 2021-02-20 LAB — SARS-COV-2 RNA RESP QL NAA+PROBE: NOT DETECTED

## 2021-02-22 ENCOUNTER — HOSPITAL ENCOUNTER (OUTPATIENT)
Dept: CV DIAGNOSTICS | Facility: HOSPITAL | Age: 36
Discharge: HOME | End: 2021-02-22
Attending: FAMILY MEDICINE
Payer: COMMERCIAL

## 2021-02-22 DIAGNOSIS — Z82.49 FAMILY HISTORY OF PREMATURE CAD: ICD-10-CM

## 2021-02-22 DIAGNOSIS — R07.89 OTHER CHEST PAIN: ICD-10-CM

## 2021-02-22 PROCEDURE — 93350 STRESS TTE ONLY: CPT | Performed by: FAMILY MEDICINE

## 2021-02-22 PROCEDURE — 93017 CV STRESS TEST TRACING ONLY: CPT | Performed by: FAMILY MEDICINE

## 2021-02-22 PROCEDURE — 93018 CV STRESS TEST I&R ONLY: CPT | Performed by: FAMILY MEDICINE

## 2021-03-01 ENCOUNTER — APPOINTMENT (OUTPATIENT)
Dept: CARDIOLOGY | Age: 36
End: 2021-03-01

## 2021-03-05 RX ORDER — ROSUVASTATIN CALCIUM 10 MG/1
10 TABLET, COATED ORAL AT BEDTIME
COMMUNITY
Start: 2020-12-24 | End: 2021-03-24

## 2021-03-05 RX ORDER — ERGOCALCIFEROL 1.25 MG/1
CAPSULE ORAL
COMMUNITY
Start: 2021-03-01

## 2021-03-05 RX ORDER — TOPIRAMATE 25 MG/1
25 TABLET ORAL 2 TIMES DAILY
COMMUNITY
Start: 2021-02-01

## 2021-03-05 RX ORDER — DIVALPROEX SODIUM 500 MG/1
500 TABLET, DELAYED RELEASE ORAL DAILY
COMMUNITY
Start: 2020-06-29

## 2021-03-05 RX ORDER — PHENTERMINE HYDROCHLORIDE 37.5 MG/1
37.5 TABLET ORAL DAILY
COMMUNITY
Start: 2021-03-01

## 2021-03-05 RX ORDER — UBROGEPANT 50 MG/1
TABLET ORAL
COMMUNITY
Start: 2020-12-26

## 2021-03-05 RX ORDER — NORETHINDRONE ACETATE AND ETHINYL ESTRADIOL 1MG-20(21)
1 KIT ORAL DAILY
COMMUNITY
Start: 2020-06-29

## 2021-03-08 ENCOUNTER — APPOINTMENT (OUTPATIENT)
Dept: CARDIOLOGY | Age: 36
End: 2021-03-08

## 2021-03-09 ENCOUNTER — TELEPHONE (OUTPATIENT)
Dept: FAMILY MEDICINE CLINIC | Facility: CLINIC | Age: 36
End: 2021-03-09

## 2021-03-09 ENCOUNTER — APPOINTMENT (OUTPATIENT)
Dept: CT IMAGING | Facility: HOSPITAL | Age: 36
DRG: 202 | End: 2021-03-09
Attending: EMERGENCY MEDICINE
Payer: COMMERCIAL

## 2021-03-09 ENCOUNTER — HOSPITAL ENCOUNTER (INPATIENT)
Facility: HOSPITAL | Age: 36
LOS: 2 days | Discharge: HOME | DRG: 202 | End: 2021-03-13
Attending: HOSPITALIST | Admitting: HOSPITALIST
Payer: COMMERCIAL

## 2021-03-09 ENCOUNTER — APPOINTMENT (OUTPATIENT)
Dept: GENERAL RADIOLOGY | Facility: HOSPITAL | Age: 36
DRG: 202 | End: 2021-03-09
Attending: EMERGENCY MEDICINE
Payer: COMMERCIAL

## 2021-03-09 DIAGNOSIS — R06.00 EXERTIONAL DYSPNEA: Primary | ICD-10-CM

## 2021-03-09 DIAGNOSIS — R09.02 HYPOXIA: ICD-10-CM

## 2021-03-09 DIAGNOSIS — J18.9 COMMUNITY ACQUIRED PNEUMONIA, UNSPECIFIED LATERALITY: ICD-10-CM

## 2021-03-09 DIAGNOSIS — J45.901 EXACERBATION OF ASTHMA, UNSPECIFIED ASTHMA SEVERITY, UNSPECIFIED WHETHER PERSISTENT: ICD-10-CM

## 2021-03-09 PROBLEM — R06.09 EXERTIONAL DYSPNEA: Status: ACTIVE | Noted: 2021-03-09

## 2021-03-09 LAB
ADENOVIRUS PCR:: NEGATIVE
ALBUMIN SERPL-MCNC: 3.9 G/DL (ref 3.4–5)
ALBUMIN/GLOB SERPL: 1 {RATIO} (ref 1–2)
ALP LIVER SERPL-CCNC: 99 U/L
ALT SERPL-CCNC: 21 U/L
ANION GAP SERPL CALC-SCNC: 9 MMOL/L (ref 0–18)
APTT PPP: 29.6 SECONDS (ref 25.4–36.1)
AST SERPL-CCNC: 15 U/L (ref 15–37)
B PERT DNA SPEC QL NAA+PROBE: NEGATIVE
BASOPHILS # BLD AUTO: 0.03 X10(3) UL (ref 0–0.2)
BASOPHILS NFR BLD AUTO: 0.4 %
BILIRUB SERPL-MCNC: 0.4 MG/DL (ref 0.1–2)
BUN BLD-MCNC: 13 MG/DL (ref 7–18)
BUN/CREAT SERPL: 14.8 (ref 10–20)
C PNEUM DNA SPEC QL NAA+PROBE: NEGATIVE
CALCIUM BLD-MCNC: 9.5 MG/DL (ref 8.5–10.1)
CHLORIDE SERPL-SCNC: 109 MMOL/L (ref 98–112)
CO2 SERPL-SCNC: 21 MMOL/L (ref 21–32)
CORONAVIRUS 229E PCR:: NEGATIVE
CORONAVIRUS HKU1 PCR:: NEGATIVE
CORONAVIRUS NL63 PCR:: NEGATIVE
CORONAVIRUS OC43 PCR:: NEGATIVE
CREAT BLD-MCNC: 0.88 MG/DL
D-DIMER: 0.55 UG/ML FEU (ref ?–0.5)
DEPRECATED RDW RBC AUTO: 39.8 FL (ref 35.1–46.3)
EOSINOPHIL # BLD AUTO: 0.14 X10(3) UL (ref 0–0.7)
EOSINOPHIL NFR BLD AUTO: 2.1 %
ERYTHROCYTE [DISTWIDTH] IN BLOOD BY AUTOMATED COUNT: 13.1 % (ref 11–15)
FLUAV RNA SPEC QL NAA+PROBE: NEGATIVE
FLUBV RNA SPEC QL NAA+PROBE: NEGATIVE
GLOBULIN PLAS-MCNC: 4 G/DL (ref 2.8–4.4)
GLUCOSE BLD-MCNC: 95 MG/DL (ref 70–99)
HCT VFR BLD AUTO: 38.2 %
HGB BLD-MCNC: 12.9 G/DL
IMM GRANULOCYTES # BLD AUTO: 0.03 X10(3) UL (ref 0–1)
IMM GRANULOCYTES NFR BLD: 0.4 %
INR BLD: 1.12 (ref 0.89–1.11)
LYMPHOCYTES # BLD AUTO: 1.23 X10(3) UL (ref 1–4)
LYMPHOCYTES NFR BLD AUTO: 18.2 %
M PROTEIN MFR SERPL ELPH: 7.9 G/DL (ref 6.4–8.2)
MCH RBC QN AUTO: 28.5 PG (ref 26–34)
MCHC RBC AUTO-ENTMCNC: 33.8 G/DL (ref 31–37)
MCV RBC AUTO: 84.3 FL
METAPNEUMOVIRUS PCR:: NEGATIVE
MONOCYTES # BLD AUTO: 0.48 X10(3) UL (ref 0.1–1)
MONOCYTES NFR BLD AUTO: 7.1 %
MYCOPLASMA PNEUMONIA PCR:: NEGATIVE
NEUTROPHILS # BLD AUTO: 4.83 X10 (3) UL (ref 1.5–7.7)
NEUTROPHILS # BLD AUTO: 4.83 X10(3) UL (ref 1.5–7.7)
NEUTROPHILS NFR BLD AUTO: 71.8 %
OSMOLALITY SERPL CALC.SUM OF ELEC: 288 MOSM/KG (ref 275–295)
PARAINFLUENZA 1 PCR:: NEGATIVE
PARAINFLUENZA 2 PCR:: NEGATIVE
PARAINFLUENZA 3 PCR:: NEGATIVE
PARAINFLUENZA 4 PCR:: NEGATIVE
PLATELET # BLD AUTO: 220 10(3)UL (ref 150–450)
POCT URINE PREGNANCY: NEGATIVE
POTASSIUM SERPL-SCNC: 3.9 MMOL/L (ref 3.5–5.1)
PROCALCITONIN SERPL-MCNC: <0.05 NG/ML (ref ?–0.16)
PSA SERPL DL<=0.01 NG/ML-MCNC: 14.7 SECONDS (ref 12.4–14.6)
RBC # BLD AUTO: 4.53 X10(6)UL
RHINOVIRUS/ENTERO PCR:: NEGATIVE
RSV RNA SPEC QL NAA+PROBE: NEGATIVE
SARS-COV-2 RNA RESP QL NAA+PROBE: NOT DETECTED
SODIUM SERPL-SCNC: 139 MMOL/L (ref 136–145)
TROPONIN I SERPL-MCNC: <0.045 NG/ML (ref ?–0.04)
WBC # BLD AUTO: 6.7 X10(3) UL (ref 4–11)

## 2021-03-09 PROCEDURE — 71275 CT ANGIOGRAPHY CHEST: CPT | Performed by: EMERGENCY MEDICINE

## 2021-03-09 PROCEDURE — 99220 INITIAL OBSERVATION CARE,LEVL III: CPT | Performed by: INTERNAL MEDICINE

## 2021-03-09 PROCEDURE — 71045 X-RAY EXAM CHEST 1 VIEW: CPT | Performed by: EMERGENCY MEDICINE

## 2021-03-09 RX ORDER — IPRATROPIUM BROMIDE AND ALBUTEROL SULFATE 2.5; .5 MG/3ML; MG/3ML
3 SOLUTION RESPIRATORY (INHALATION) EVERY 4 HOURS PRN
Status: DISCONTINUED | OUTPATIENT
Start: 2021-03-09 | End: 2021-03-13

## 2021-03-09 RX ORDER — PHENTERMINE HYDROCHLORIDE 37.5 MG/1
37.5 TABLET ORAL
Status: DISCONTINUED | OUTPATIENT
Start: 2021-03-10 | End: 2021-03-11

## 2021-03-09 RX ORDER — METHYLPREDNISOLONE SODIUM SUCCINATE 125 MG/2ML
125 INJECTION, POWDER, LYOPHILIZED, FOR SOLUTION INTRAMUSCULAR; INTRAVENOUS ONCE
Status: COMPLETED | OUTPATIENT
Start: 2021-03-09 | End: 2021-03-09

## 2021-03-09 RX ORDER — ENOXAPARIN SODIUM 100 MG/ML
0.5 INJECTION SUBCUTANEOUS NIGHTLY
Status: DISCONTINUED | OUTPATIENT
Start: 2021-03-09 | End: 2021-03-11

## 2021-03-09 RX ORDER — CETIRIZINE HYDROCHLORIDE 5 MG/1
5 TABLET ORAL DAILY
Status: DISCONTINUED | OUTPATIENT
Start: 2021-03-10 | End: 2021-03-13

## 2021-03-09 RX ORDER — POLYETHYLENE GLYCOL 3350 17 G/17G
17 POWDER, FOR SOLUTION ORAL DAILY PRN
Status: DISCONTINUED | OUTPATIENT
Start: 2021-03-09 | End: 2021-03-13

## 2021-03-09 RX ORDER — ASPIRIN 81 MG/1
81 TABLET ORAL DAILY
Status: DISCONTINUED | OUTPATIENT
Start: 2021-03-10 | End: 2021-03-13

## 2021-03-09 RX ORDER — HYDROXYZINE HYDROCHLORIDE 25 MG/1
25 TABLET, FILM COATED ORAL 3 TIMES DAILY PRN
Status: DISCONTINUED | OUTPATIENT
Start: 2021-03-09 | End: 2021-03-13

## 2021-03-09 RX ORDER — HYDROXYZINE HYDROCHLORIDE 25 MG/1
25 TABLET, FILM COATED ORAL 3 TIMES DAILY PRN
COMMUNITY
End: 2021-03-17

## 2021-03-09 RX ORDER — SODIUM CHLORIDE 9 MG/ML
INJECTION, SOLUTION INTRAVENOUS CONTINUOUS
Status: ACTIVE | OUTPATIENT
Start: 2021-03-09 | End: 2021-03-09

## 2021-03-09 RX ORDER — ONDANSETRON 2 MG/ML
4 INJECTION INTRAMUSCULAR; INTRAVENOUS EVERY 6 HOURS PRN
Status: DISCONTINUED | OUTPATIENT
Start: 2021-03-09 | End: 2021-03-13

## 2021-03-09 RX ORDER — OLOPATADINE HYDROCHLORIDE 1 MG/ML
1 SOLUTION/ DROPS OPHTHALMIC AS NEEDED
COMMUNITY

## 2021-03-09 RX ORDER — ALBUTEROL SULFATE 90 UG/1
2 AEROSOL, METERED RESPIRATORY (INHALATION) 4 TIMES DAILY
Status: DISCONTINUED | OUTPATIENT
Start: 2021-03-09 | End: 2021-03-13

## 2021-03-09 RX ORDER — ACETAMINOPHEN 325 MG/1
650 TABLET ORAL EVERY 6 HOURS PRN
Status: DISCONTINUED | OUTPATIENT
Start: 2021-03-09 | End: 2021-03-13

## 2021-03-09 RX ORDER — FLUOXETINE HYDROCHLORIDE 20 MG/1
40 CAPSULE ORAL DAILY
Status: DISCONTINUED | OUTPATIENT
Start: 2021-03-10 | End: 2021-03-13

## 2021-03-09 RX ORDER — MELATONIN
3 NIGHTLY PRN
Status: DISCONTINUED | OUTPATIENT
Start: 2021-03-09 | End: 2021-03-13

## 2021-03-09 RX ORDER — DOCUSATE SODIUM 100 MG/1
100 CAPSULE, LIQUID FILLED ORAL 2 TIMES DAILY PRN
Status: DISCONTINUED | OUTPATIENT
Start: 2021-03-09 | End: 2021-03-13

## 2021-03-09 RX ORDER — DIVALPROEX SODIUM 500 MG/1
500 TABLET, DELAYED RELEASE ORAL DAILY
Status: DISCONTINUED | OUTPATIENT
Start: 2021-03-10 | End: 2021-03-13

## 2021-03-09 RX ORDER — PREDNISONE 20 MG/1
40 TABLET ORAL
Status: DISCONTINUED | OUTPATIENT
Start: 2021-03-10 | End: 2021-03-10

## 2021-03-09 RX ORDER — TOPIRAMATE 25 MG/1
25 TABLET ORAL 2 TIMES DAILY
Status: DISCONTINUED | OUTPATIENT
Start: 2021-03-09 | End: 2021-03-13

## 2021-03-09 RX ORDER — ROSUVASTATIN CALCIUM 10 MG/1
10 TABLET, COATED ORAL NIGHTLY
Status: DISCONTINUED | OUTPATIENT
Start: 2021-03-09 | End: 2021-03-13

## 2021-03-09 NOTE — TELEPHONE ENCOUNTER
Pt is audibly SOB. States she \"can't catch breath\" in past few days. States chest hurts and o2 sats have dropped to 79, but she's been too busy at work to seek treatment. Was 80 last night and she did neb tx and it came back up.    I explained to pt she

## 2021-03-09 NOTE — TELEPHONE ENCOUNTER
Patient called to schedule with LE regarding medication follow up and regarding what I thought she said was her cpap. She is having issues breathing and it has been going on for awhile. Gets winded walking around the block.   Does nebulizer treatments and

## 2021-03-09 NOTE — TELEPHONE ENCOUNTER
Sending to [facility] as FYI for upcoming appt. Pt confirmed with me twice she was going to ED. As of time for me to leave office, she is not checked in there.

## 2021-03-09 NOTE — TELEPHONE ENCOUNTER
Called pt back to ask if she still planned on going to ED because she has not checked in yet. Told me yes, she is going to go in just a little bit. Reiterated pt needs to be seen. She agrees to go.

## 2021-03-09 NOTE — ED INITIAL ASSESSMENT (HPI)
Patient here with SOB the last 3 days. Patient states her O2 sat was 88% this morning and states her O2 sats drop to 80% while she is sleeping. Patient did a neb treatment this morning when she woke up. History of Covid in April 2020.   Patient states dy

## 2021-03-10 ENCOUNTER — APPOINTMENT (OUTPATIENT)
Dept: CV DIAGNOSTICS | Facility: HOSPITAL | Age: 36
DRG: 202 | End: 2021-03-10
Attending: HOSPITALIST
Payer: COMMERCIAL

## 2021-03-10 ENCOUNTER — APPOINTMENT (OUTPATIENT)
Dept: NUCLEAR MEDICINE | Facility: HOSPITAL | Age: 36
DRG: 202 | End: 2021-03-10
Attending: HOSPITALIST
Payer: COMMERCIAL

## 2021-03-10 LAB — IMMUNOGLOBULIN E: 248 IU/ML (ref 3.6–114)

## 2021-03-10 PROCEDURE — 99225 SUBSEQUENT OBSERVATION CARE: CPT | Performed by: HOSPITALIST

## 2021-03-10 PROCEDURE — 93306 TTE W/DOPPLER COMPLETE: CPT | Performed by: HOSPITALIST

## 2021-03-10 PROCEDURE — 78580 LUNG PERFUSION IMAGING: CPT | Performed by: HOSPITALIST

## 2021-03-10 RX ORDER — METHYLPREDNISOLONE SODIUM SUCCINATE 40 MG/ML
40 INJECTION, POWDER, LYOPHILIZED, FOR SOLUTION INTRAMUSCULAR; INTRAVENOUS EVERY 12 HOURS
Status: DISCONTINUED | OUTPATIENT
Start: 2021-03-10 | End: 2021-03-13

## 2021-03-10 RX ORDER — PREDNISONE 10 MG/1
TABLET ORAL
Qty: 20 TABLET | Refills: 0 | Status: ON HOLD | OUTPATIENT
Start: 2021-03-10 | End: 2021-03-19

## 2021-03-10 RX ORDER — KETOROLAC TROMETHAMINE 15 MG/ML
15 INJECTION, SOLUTION INTRAMUSCULAR; INTRAVENOUS EVERY 6 HOURS PRN
Status: DISPENSED | OUTPATIENT
Start: 2021-03-10 | End: 2021-03-12

## 2021-03-10 RX ORDER — KETOTIFEN FUMARATE 0.35 MG/ML
1 SOLUTION/ DROPS OPHTHALMIC 2 TIMES DAILY
Status: DISCONTINUED | OUTPATIENT
Start: 2021-03-10 | End: 2021-03-13

## 2021-03-10 NOTE — ED NOTES
While in CT patient stated feeling a sharp pain under her left breast. Upon return to room patient placed on monitor. All vitals normal. MD at bedside.

## 2021-03-10 NOTE — PROGRESS NOTES
[facility] Note: Antimicrobial Weight Based Dose Adjustment for: ceftriaxone (ROCEPHIN)    Michael Waldrop is a 38year old patient who has been prescribed ceftriaxone (ROCEPHIN) 1 g x 1 dose.     Estimated Creatinine Clearance: 85.1 mL/min (based on

## 2021-03-10 NOTE — TELEPHONE ENCOUNTER
Pt is inpatient currently. Cancelled OV for today. Sent pt my chart to let her know to call us to schedule f/u when ready.

## 2021-03-10 NOTE — PLAN OF CARE
Patient alert and oriented x4. On ra, . NSR on tele. HR 80s. Pt complains of 7/10 back pain and chest tightness. MD notified. IV steroids. PRN toradol. pulm consulted. Resting comfortably in bed. Will continue to monitor.

## 2021-03-10 NOTE — ED PROVIDER NOTES
Patient Seen in: [facility] Emergency Department      History   Patient presents with:  Difficulty Breathing    Stated Complaint: Shortness of breath, asthma    HPI/Subjective:   HPI    Patient is 47-year-old female presents emergency room with hist Roya Ruelas MD at [id number] [place] [address] N/A 11/19/2018    Performed by Jasmyne Barbour MD at [id number] [place] [address] N/A 5/1/2018    Performed by Jasmyne Barbour MD at [address] [place] [address] membranes are moist.  NECK: There is no focal tenderness to palpation appreciated. There is no JVD. No meningeal signs or nuchal rigidity appreciated. No stridor. LUNGS: Clear at the apices with somewhat diminished breath sounds at both lung bases.  There result                 Please view results for these tests on the individual orders.    PROCALCITONIN   RAINBOW DRAW BLUE   RAINBOW DRAW LAVENDER   RAINBOW DRAW LIGHT GREEN   RAINBOW DRAW GOLD   CBC W/ DIFFERENTIAL     EKG    Rate, intervals and axes as not (CPT=71045)    Result Date: 3/9/2021  CONCLUSION:  No active cardiopulmonary process identified.     Dictated by (CST): Marcin Soto MD on 3/09/2021 at 6:30 PM     Finalized by (CST): Marcin Soto MD on 3/09/2021 at 6:30 PM              MDM        19: here in the emergency room. The patient did have some discomfort in her chest after CAT scan multiple repeat examinations showed no evidence of any wheeze, stridor, rash, or uvular edema. Repeat EKG at that time showed no acute findings.   The patient was

## 2021-03-10 NOTE — CM/SW NOTE
MSW, Isaac Healy and RN discussed patient's post d/c needs in care rounds. No identified needs at this time, MSW will remain available should needs change.

## 2021-03-10 NOTE — PROGRESS NOTES
[facility]  Progress Note     Winston Zimmerman Patient Status:  Observation    10/15/1985 MRN [id number]   [facility] 3NE-A Attending Michael Powell, [address] [facility] [place] Day # 0 PCP Leslie Jain DO     Chief Complaint: SOB    S: Patient se with breakfast   • aspirin EC  81 mg Oral Daily   • Cetirizine HCl  5 mg Oral Daily   • divalproex Sodium  500 mg Oral Daily   • FLUoxetine HCl  40 mg Oral Daily   • Phentermine HCl  37.5 mg Oral QAM AC   • Rosuvastatin Calcium  10 mg Oral Nightly   • topi

## 2021-03-10 NOTE — PLAN OF CARE
Resumed care of pt. At 2200. Pt. Arrived via Cart.     Cardiac diet  RA, Tele: NS,   CT negative for PE despite D-Dimer elevated at 0.55    Prednisone to start on 3/10 for 5 days      Problem: RESPIRATORY - ADULT  Goal: Achieves optimal ventilation and as appropriate  Outcome: Progressing

## 2021-03-10 NOTE — H&P
[facility]  History and Physical     Independence Karlee Patient Status:  Emergency    10/15/1985 MRN [id number]   Location [address] [facility] Attending Ge Walls MD   Hosp Day # 0 PCP Sheyla Whelan DO     Chief Compla without esophagitis 1/7/2016   • GERD (gastroesophageal reflux disease)    • Migraines    • Primary ovarian failure    • Reactive airway disease       Past Surgical History:   Past Surgical History:   Procedure Laterality Date   • CHOLECYSTECTOMY     • ESO Alcohol and Other Disorders Associated Maternal Uncle      Allergies:   Shellfish Allergy       ANAPHYLAXIS    Comment:epiglottitis  Shellfish-Derived P*    ANAPHYLAXIS  Avelox [Moxifloxaci*    RASH  Bupropion               OTHER (SEE COMMENTS)  Seasonal 3  divalproex Sodium 500 MG Oral Tab EC DR tab, Take 1 tablet (500 mg total) by mouth daily. , Disp: 30 tablet, Rfl: 2  Cetirizine HCl 5 MG Oral Tab, Take 5 mg by mouth daily. , Disp: , Rfl:   aspirin EC 81 MG Oral Tab EC, Take 81 mg by mouth daily. , Disp: , possibly mild asthma exacerbation. CTA negative for PE but does show groundglass opacities and she had COVID-19 infection 1 year ago and is currently vaccinated.   She is not wheezing for me on exam but has had similar symptoms with previous asthma exacerb

## 2021-03-10 NOTE — CONSULTS
[facility] Note  [facility]  Report of Consultation    Cyril Shafer Patient Status:  Observation    10/15/1985 MRN [id number]   [facility] 3NE-A Attending Cathleen Varghese, DO   Hosp D Radha Waldrop MD at [address] [place] [address] N/A 5/1/2018    Performed by Jb Ye MD at [address] [place] [address] N/A 4/16/2018    Performed by Jb Ye MD at [address] [place] [address] Sulfate HFA  2 puff Inhalation QID   • aspirin EC  81 mg Oral Daily   • Cetirizine HCl  5 mg Oral Daily   • divalproex Sodium  500 mg Oral Daily   • FLUoxetine HCl  40 mg Oral Daily   • Phentermine HCl  37.5 mg Oral QAM AC   • Rosuvastatin Calcium  10 mg O 68 15 94 % — —   03/10/21 0558 110/63 98.6 °F (37 °C) Oral 89 16 98 % — —   03/09/21 2309 127/74 99.3 °F (37.4 °C) Oral 104 20 97 % — —   03/09/21 2056 151/89 97.7 °F (36.5 °C) Oral 101 22 99 % 5' 7\" (1.702 m) 255 lb (115.7 kg)   03/09/21 1930 136/88 — — ABGPHT, PRVKKG1Z, LVILB3W, ABGHCO3, ABGBE, TEMP, CORAL, SITE, DEV, THGB in the last 168 hours. Invalid input(s): [id number], FIORELLA    Cultures:     No results found for this visit on 03/09/21.   No results for input(s): Dell Amaral, [address] [facility], [address] [facility], 3/09/2021 at 6:30 PM           ASSESSMENT    · Dyspnea  · Hypoxia  · Asthma hx  · ALYSSA sx  · RUL lung nodule  · Obesity      PLAN    · Perfusion scan and CTA neg for chronic or acute clot. Echo neg for shunt or rv dysfunction and nl LV function.    · Given r

## 2021-03-11 LAB
ATRIAL RATE: 106 BPM
ATRIAL RATE: 98 BPM
P AXIS: 25 DEGREES
P AXIS: 35 DEGREES
P-R INTERVAL: 126 MS
P-R INTERVAL: 130 MS
Q-T INTERVAL: 360 MS
Q-T INTERVAL: 370 MS
QRS DURATION: 90 MS
QRS DURATION: 94 MS
QTC CALCULATION (BEZET): 472 MS
QTC CALCULATION (BEZET): 478 MS
R AXIS: -5 DEGREES
R AXIS: 3 DEGREES
T AXIS: 35 DEGREES
T AXIS: 38 DEGREES
VENTRICULAR RATE: 106 BPM
VENTRICULAR RATE: 98 BPM

## 2021-03-11 PROCEDURE — 99225 SUBSEQUENT OBSERVATION CARE: CPT | Performed by: HOSPITALIST

## 2021-03-11 NOTE — PLAN OF CARE
Patient alert and oriented x4. On Ra, . Patient states pain is slightly improved 3/10. IV solumedrol. Resting comfortably in bed. Will continue to monitor. NPO after midnight for possible bronch. Consent signed and in chart.

## 2021-03-11 NOTE — PROGRESS NOTES
[facility] Pulmonary/Critical Care Progress Note     SUBJECTIVE/Interval history: All events, procedures, notes reviewed.  Pt is sob and tachypneic but not hypoxic today      Review of Systems:   A comprehensive 14 point    K 3.9      CO2 21.0     Recent Labs   Lab 03/09/21  1736   RBC 4.53   HGB 12.9   HCT 38.2   MCV 84.3   MCH 28.5   MCHC 33.8   RDW 13.1   NEPRELIM 4.83   WBC 6.7   .0     No results for input(s): BNP in the last 168 hours.   Recent La 18 to 24 months. In high risk patients, CT in 6 to 12 months, then obtain CT in 18 to 24 months.      Dictated by (CST): Marcin Soto MD on 3/09/2021 at 7:44 PM     Finalized by (CST): Marcin Soto MD on 3/09/2021 at 7:49 PM       NM LUNG PERFUSION S sleep study  · Started ICS/LABA for better ongoing asthma control  · Repeat ct chest in 6 to 12 months for lung nodule  · On phenteremine/topamax for weight loss.  - no pulm htn per echo  · Proph: LMWH (hold tonight)  · Keep npo after midnight for possible

## 2021-03-11 NOTE — PLAN OF CARE
Resumed care of pt. At [address]. Pt. Is Ax4, resting in bed.      Regular diet  RA, Tele: NS-SB, -intermittent  Nebs PRN-Incentive Spirometry  PIV:SL    Attarax given via MAR      Problem: RESPIRATORY - ADULT  Goal: Achieves optimal ventilation and oxygena appropriate  Outcome: Progressing

## 2021-03-11 NOTE — PROGRESS NOTES
[facility]  Progress Note     Pop Prescott Patient Status:  Observation    10/15/1985 MRN [id number]   [facility] 3NE-A Attending Yolanda King, [address] [facility] Day # 0 PCP Mirna Benavides DO     Chief Complaint: SOB    S: Patient se Furoate-Vilanterol  1 puff Inhalation Daily   • Albuterol Sulfate HFA  2 puff Inhalation QID   • aspirin EC  81 mg Oral Daily   • Cetirizine HCl  5 mg Oral Daily   • divalproex Sodium  500 mg Oral Daily   • FLUoxetine HCl  40 mg Oral Daily   • Rosuvastatin

## 2021-03-12 ENCOUNTER — APPOINTMENT (OUTPATIENT)
Dept: GENERAL RADIOLOGY | Facility: HOSPITAL | Age: 36
DRG: 202 | End: 2021-03-12
Attending: INTERNAL MEDICINE
Payer: COMMERCIAL

## 2021-03-12 ENCOUNTER — ANESTHESIA (OUTPATIENT)
Dept: ENDOSCOPY | Facility: HOSPITAL | Age: 36
DRG: 202 | End: 2021-03-12
Payer: COMMERCIAL

## 2021-03-12 ENCOUNTER — ANESTHESIA EVENT (OUTPATIENT)
Dept: ENDOSCOPY | Facility: HOSPITAL | Age: 36
DRG: 202 | End: 2021-03-12
Payer: COMMERCIAL

## 2021-03-12 ENCOUNTER — APPOINTMENT (OUTPATIENT)
Dept: GENERAL RADIOLOGY | Facility: HOSPITAL | Age: 36
DRG: 202 | End: 2021-03-12
Attending: HOSPITALIST
Payer: COMMERCIAL

## 2021-03-12 LAB
BASOPHIL BRONCHIAL WASHING: 0 %
EOSINOPHIL BRONCHIAL WASHING: 0 %
INR BLD: 1.1 (ref 0.89–1.11)
LYMPHOCYTE BRONCHIAL WASHING: 6 %
MON/MACROPHAGE BRONCHIAL WASH: 90 %
NEUTROPHILS BRONCHIAL WASHING: 4 %
PSA SERPL DL<=0.01 NG/ML-MCNC: 14.5 SECONDS (ref 12.4–14.6)
RBC # FLD: 160 /MM3
RBC BRONCH FOR MAN CT: 58 /MM3
TOTAL CELLS COUNTED: 100
TROPONIN I SERPL-MCNC: <0.045 NG/ML (ref ?–0.04)

## 2021-03-12 PROCEDURE — 99232 SBSQ HOSP IP/OBS MODERATE 35: CPT | Performed by: HOSPITALIST

## 2021-03-12 PROCEDURE — 0B9J8ZX DRAINAGE OF LEFT LOWER LUNG LOBE, VIA NATURAL OR ARTIFICIAL OPENING ENDOSCOPIC, DIAGNOSTIC: ICD-10-PCS | Performed by: INTERNAL MEDICINE

## 2021-03-12 PROCEDURE — 0BBB8ZX EXCISION OF LEFT LOWER LOBE BRONCHUS, VIA NATURAL OR ARTIFICIAL OPENING ENDOSCOPIC, DIAGNOSTIC: ICD-10-PCS | Performed by: INTERNAL MEDICINE

## 2021-03-12 PROCEDURE — 71045 X-RAY EXAM CHEST 1 VIEW: CPT | Performed by: INTERNAL MEDICINE

## 2021-03-12 RX ORDER — SODIUM CHLORIDE 9 MG/ML
INJECTION, SOLUTION INTRAVENOUS CONTINUOUS PRN
Status: DISCONTINUED | OUTPATIENT
Start: 2021-03-12 | End: 2021-03-12 | Stop reason: SURG

## 2021-03-12 RX ORDER — KETOROLAC TROMETHAMINE 15 MG/ML
15 INJECTION, SOLUTION INTRAMUSCULAR; INTRAVENOUS EVERY 6 HOURS PRN
Status: DISCONTINUED | OUTPATIENT
Start: 2021-03-12 | End: 2021-03-13

## 2021-03-12 RX ORDER — LIDOCAINE HYDROCHLORIDE 10 MG/ML
INJECTION, SOLUTION EPIDURAL; INFILTRATION; INTRACAUDAL; PERINEURAL AS NEEDED
Status: DISCONTINUED | OUTPATIENT
Start: 2021-03-12 | End: 2021-03-12 | Stop reason: SURG

## 2021-03-12 RX ORDER — SODIUM CHLORIDE, SODIUM LACTATE, POTASSIUM CHLORIDE, CALCIUM CHLORIDE 600; 310; 30; 20 MG/100ML; MG/100ML; MG/100ML; MG/100ML
INJECTION, SOLUTION INTRAVENOUS CONTINUOUS
Status: DISCONTINUED | OUTPATIENT
Start: 2021-03-12 | End: 2021-03-13

## 2021-03-12 RX ORDER — NALOXONE HYDROCHLORIDE 0.4 MG/ML
80 INJECTION, SOLUTION INTRAMUSCULAR; INTRAVENOUS; SUBCUTANEOUS AS NEEDED
Status: ACTIVE | OUTPATIENT
Start: 2021-03-12 | End: 2021-03-13

## 2021-03-12 RX ORDER — HEPARIN SODIUM 5000 [USP'U]/ML
5000 INJECTION, SOLUTION INTRAVENOUS; SUBCUTANEOUS EVERY 12 HOURS SCHEDULED
Status: DISCONTINUED | OUTPATIENT
Start: 2021-03-12 | End: 2021-03-13

## 2021-03-12 RX ORDER — LIDOCAINE HYDROCHLORIDE 20 MG/ML
INJECTION, SOLUTION EPIDURAL; INFILTRATION; INTRACAUDAL; PERINEURAL
Status: DISCONTINUED | OUTPATIENT
Start: 2021-03-12 | End: 2021-03-12 | Stop reason: HOSPADM

## 2021-03-12 RX ORDER — ONDANSETRON 2 MG/ML
4 INJECTION INTRAMUSCULAR; INTRAVENOUS ONCE AS NEEDED
Status: ACTIVE | OUTPATIENT
Start: 2021-03-12 | End: 2021-03-12

## 2021-03-12 RX ADMIN — LIDOCAINE HYDROCHLORIDE 25 MG: 10 INJECTION, SOLUTION EPIDURAL; INFILTRATION; INTRACAUDAL; PERINEURAL at 12:28:00

## 2021-03-12 RX ADMIN — SODIUM CHLORIDE: 9 INJECTION, SOLUTION INTRAVENOUS at 12:08:00

## 2021-03-12 RX ADMIN — SODIUM CHLORIDE: 9 INJECTION, SOLUTION INTRAVENOUS at 12:46:00

## 2021-03-12 NOTE — PLAN OF CARE
Patient alert and oriented x4. On Ra, . NSR on tele. Morning meds held. Patient NPO. Plan for bronch today at 1200. Consent signed. Resting comfortably in bed. Will continue to monitor upon return. 1730:  S/p bronch.   Pt with worsening chest

## 2021-03-12 NOTE — ANESTHESIA POSTPROCEDURE EVALUATION
[address] [facility] Patient Status:  Inpatient   Age/Gender 38year old female MRN [id number]   Location [facility]. Attending Shoshana Che, [address] [facility] Day # 1 PCP Krystle Sam DO       Anesthesia Post-op Note    Procedure(

## 2021-03-12 NOTE — OPERATIVE REPORT
[address] [facility] Patient Status:  Inpatient    10/15/1985 MRN [id number]   [facility] ENDOSCOPY Attending Lazarus Handy, [address] [facility] Day # 1 PCP Nate Rm DO       OPERATIVE REPORT:    DATE OF OPERATION: 3/12/2021 dedicated chest x-ray is pending at the time of this dictation. EBL:  <5cc    IMPRESSION: pulmonary infiltrates, hypoxia     PLAN: We will await results of bronchoscopy for further delineation of care.     Renee Horton MD, [place] [address] [facility]

## 2021-03-12 NOTE — PROGRESS NOTES
[facility]  Progress Note    Pop Prescott Patient Status:  Inpatient    10/15/1985 MRN [id number]   [facility] ENDOSCOPY Attending Yolanda King, [address] [facility] Day # 1 PCP Mirna Benavides DO     Subjective:  Pop Prescott is a(n 04/22/2020       Pro-Calcitonin  Recent Labs   Lab 03/09/21  1736   PCT <0.05       Cardiac  Recent Labs   Lab 03/09/21  1736   TROP <0.045       Creatinine Kinase  No results for input(s): CK in the last 168 hours.     Inflammatory Markers  Recent Labs   L persistent hypoxia,with hx of having two dogs (9 years) and working with [employer], bronchoscopy recommended with BAP and TBBX for possible HP and send off cd4/cd8 ratio on bal.  Risks and benefits of the procedure reviewed in detail including but not exclus

## 2021-03-12 NOTE — PLAN OF CARE
Resumed care of pt. At 1915. Pt. Is Ax4, resting in bed. NPO at midnight for possible bronchoscopy on 3-12. Accuchecks Q6 while NPO.           Problem: RESPIRATORY - ADULT  Goal: Achieves optimal ventilation and oxygenation  Description: INTERVENTIONS:

## 2021-03-12 NOTE — PROGRESS NOTES
[facility]  Progress Note     Buddy Sin Patient Status:  Observation    10/15/1985 MRN [id number]   [facility] 3NE-A Attending Yudith Bush, [address] [facility] Day # 1 PCP Alec Rodriguez DO     Chief Complaint: SOB    S: Patient se Succ  40 mg Intravenous Q12H   • Fluticasone Furoate-Vilanterol  1 puff Inhalation Daily   • Albuterol Sulfate HFA  2 puff Inhalation QID   • aspirin EC  81 mg Oral Daily   • Cetirizine HCl  5 mg Oral Daily   • divalproex Sodium  500 mg Oral Daily   • FLUo

## 2021-03-12 NOTE — PROGRESS NOTES
Spoke with Dr. Miriam Rodríguez Chest Xray negative, continue Toradol for pain. Patient okay to return to floor.  Report was given to floor nurse and patient stating improvement and some relief

## 2021-03-12 NOTE — ANESTHESIA PREPROCEDURE EVALUATION
PRE-OP EVALUATION    Patient Name: Brianna Kemp    Pre-op Diagnosis: INPT    Procedure(s):      Surgeon(s) and Role:     * Jareth Dobbs MD - Primary    Pre-op vitals reviewed.   Temp: 97.5 °F (36.4 °C)  Pulse: 60  Resp: 18  BP: 111/60  SpO2: 96 50 mg (patient supplied), 50 mg, Oral, BID PRN        Outpatient Medications:   hydrOXYzine HCl 25 MG Oral Tab, Take 25 mg by mouth 3 (three) times daily as needed for Anxiety. , Disp: , Rfl: , 3/8/2021 at 2100  Olopatadine HCl 0.1 % Ophthalmic Solution, Pl Disp: , Rfl: , 3/9/2021 at 0900        Allergies: Shellfish Allergy, Shellfish-Derived Products, Avelox [Moxifloxacin], Bupropion, Seasonal, Sumatriptan, Tramadol, Wellbutrin Xl [Budeprion Xl], and Hydrocodone-Acetaminophen      Anesthesia Evaluation    Pa Packs/day: 1.00        Years: 15.00        Pack years: 15      Smokeless tobacco: Former User        Quit date: 8/1/2019    Alcohol use: Yes      Alcohol/week: 0.0 standard drinks      Comment: socially      Drug use: No     Available pre-op labs reviewed.

## 2021-03-13 VITALS
BODY MASS INDEX: 40.02 KG/M2 | RESPIRATION RATE: 18 BRPM | WEIGHT: 255 LBS | SYSTOLIC BLOOD PRESSURE: 113 MMHG | DIASTOLIC BLOOD PRESSURE: 65 MMHG | TEMPERATURE: 98 F | OXYGEN SATURATION: 99 % | HEIGHT: 67 IN | HEART RATE: 67 BPM

## 2021-03-13 LAB
MYELOPEROX ANTIBODIES, IGG: 0 AU/ML
SERINE PROTEASE3, IGG: 1 AU/ML

## 2021-03-13 PROCEDURE — 99239 HOSP IP/OBS DSCHRG MGMT >30: CPT | Performed by: HOSPITALIST

## 2021-03-13 RX ORDER — PREDNISONE 20 MG/1
40 TABLET ORAL
Status: DISCONTINUED | OUTPATIENT
Start: 2021-03-13 | End: 2021-03-13

## 2021-03-13 NOTE — PLAN OF CARE
A/O x 4  IV steroids charged to PO, see MAR  Patient reported not having a BM since 03/08/21. Miralax and Colase has been offered PRN, See MAR. Patient refused.    Vital signs WNL, Room air, NSR, SB  Voids into the toilet  Denies pain  Ambulates up sotero  R A and perform as needed  - Assess and instruct to report SOB or any respiratory difficulty  - Respiratory Therapy support as indicated  - Manage/alleviate anxiety  - Monitor for signs/symptoms of CO2 retention  3/13/2021 1229 by Jan Rooney  Outcome: P

## 2021-03-13 NOTE — PROGRESS NOTES
[facility]  Progress Note    Mickykailee Brent Patient Status:  Inpatient    10/15/1985 MRN [id number]   [facility] ENDOSCOPY Attending Curtis Mckeon, [address] Day # 2 PCP Van Marley DO     Subjective:  Reedaliyah Kennedy is a(n Detected (A) 04/22/2020       Pro-Calcitonin  Recent Labs   Lab 03/09/21  1736   PCT <0.05       Cardiac  Recent Labs   Lab 03/09/21  1736 03/12/21  1730   TROP <0.045 <0.045       Creatinine Kinase  No results for input(s): CK in the last 168 hours.     In supplied), 50 mg, Oral, BID PRN        Assessment:  · Acute dyspnea  · Acute hypoxia  · Hx asthma  · ALYSSA suspected  · RUL lung nodule  · Obesity  · Spinal stenosis with nerve stimulator  · Anxiety/depression    Plan:  · Perfusion scan and CTA neg for chron

## 2021-03-13 NOTE — PLAN OF CARE
Pt is A&O x4. VSS- NSR/SB on tele. SpO2 remains stable on RA however patient is still tachypneic and having shallow breathing. Lung sounds clear but diminished.  Pt reports left lateral/posterior chest pain that is worse with deep breathing- PRN toradol giv INTERVENTIONS:  - Monitor Blood Glucose as ordered  - Assess for signs and symptoms of hyperglycemia and hypoglycemia  - Administer ordered medications to maintain glucose within target range  - Assess barriers to adequate nutritional intake and initiate n

## 2021-03-13 NOTE — PROGRESS NOTES
[facility] HOSPITALIST  Progress Note     Maria G Cornelius Patient Status:  Observation    10/15/1985 MRN [id number]   [facility] 3NE-A Attending Ranjan Jacques, [facility] Day # 2 PCP Caleb Simons DO     Chief Complaint: SOB    S: Patient se Heparin Sodium (Porcine)  5,000 Units Subcutaneous 2 times per day   • Ketotifen Fumarate  1 drop Both Eyes BID   • Fluticasone Furoate-Vilanterol  1 puff Inhalation Daily   • Albuterol Sulfate HFA  2 puff Inhalation QID   • aspirin EC  81 mg Oral Daily

## 2021-03-13 NOTE — DISCHARGE SUMMARY
[facility] HOSPITALIST  DISCHARGE SUMMARY     Ger Hanson Patient Status:  Inpatient    10/15/1985 MRN [id number]   [facility] 3NE-A Attending Vannessa Malone, [address] [facility] Day # 2 PCP Anjana Andrea DO     Date of Admission: 3/9/2021  Date o pulmonary nodule in the right upper lobe. Labs were unremarkable. Brief Synopsis: Patient admitted with dyspnea. CT chest with bilateral ground glass opacities. Pulmonary consulted. RVP and rapid COVID negative. Patient placed on empiric steroids.  Conc Nebu  Commonly known as: VENTOLIN      Take 3 mL (2.5 mg total) by nebulization every 4 (four) hours as needed for Wheezing. Quantity: 1 Box  Refills: 0     aspirin EC 81 MG Tbec      Take 81 mg by mouth daily.    Refills: 0     Cetirizine HCl 5 MG Tabs to [address] [place] [address], [address] [facility]  [address] [facility] [phone], [phone]  [address] [place] [address], [address]    Phone: [phone]   · Fluticasone Furoate-Vilanterol 200-25 MCG/INH Aepb     Please  your pre

## 2021-03-14 LAB
ASPERGILLUS GALACTOMANNAN AG, BAL: NEGATIVE
ASPERGILLUS GALACTOMANNAN INDX: 0.06

## 2021-03-15 ENCOUNTER — TELEPHONE (OUTPATIENT)
Dept: FAMILY MEDICINE CLINIC | Facility: CLINIC | Age: 36
End: 2021-03-15

## 2021-03-15 ENCOUNTER — PATIENT OUTREACH (OUTPATIENT)
Dept: CASE MANAGEMENT | Age: 36
End: 2021-03-15

## 2021-03-15 DIAGNOSIS — Z02.9 ENCOUNTERS FOR UNSPECIFIED ADMINISTRATIVE PURPOSE: ICD-10-CM

## 2021-03-15 DIAGNOSIS — R06.00 EXERTIONAL DYSPNEA: ICD-10-CM

## 2021-03-15 LAB
ATRIAL RATE: 91 BPM
P AXIS: 24 DEGREES
P-R INTERVAL: 132 MS
Q-T INTERVAL: 376 MS
QRS DURATION: 90 MS
QTC CALCULATION (BEZET): 462 MS
R AXIS: 42 DEGREES
T AXIS: 22 DEGREES
VENTRICULAR RATE: 91 BPM

## 2021-03-15 NOTE — TELEPHONE ENCOUNTER
Pt discharged 3-13-21, exertional dyspnea. Pt does not have HFU appt scheduled at this time. NCM tried to schedule however no openings in PCP schedule. TCM/HFU appt recommended by 3-20-21 as pt is a high risk for readmission.   Please discuss with PCP a

## 2021-03-15 NOTE — PROGRESS NOTES
Initial Post Discharge Follow Up   Discharge Date: 3/13/21  Contact Date: 3/15/2021    Consent Verification:  Assessment Completed With: Patient  HIPAA Verified? Yes    Discharge Dx:  1. Abnormal chest CT  2. Dyspnea  3. Pulmonary nodule  4.  Anxiety/de needed for Anxiety. • Olopatadine HCl 0.1 % Ophthalmic Solution Place 1 drop into both eyes 2 (two) times daily.      • Phentermine HCl 37.5 MG Oral Tab Take 1 tablet (37.5 mg total) by mouth every morning before breakfast. 30 tablet 0   • ergocalcifero you from taking your medication as prescribed? No  Are you having any concerns with constipation? No    Referrals/orders at D/C:  Home Health/Services ordered at D/C? No  DME ordered at D/C? No    Needs post D/C:   Now that you are home, are there any need medications reviewed/discussed/and reconciled against outpatient medications with patient,  and orders reviewed and discussed. Any changes or updates to medications and or orders sent to PCP.

## 2021-03-16 LAB
MICROPOLYSPORA FAENI ANTIBODY: DETECTED
THERMOACTINOMYCES VULGARIS #1: DETECTED

## 2021-03-17 ENCOUNTER — LAB ENCOUNTER (OUTPATIENT)
Dept: LAB | Facility: HOSPITAL | Age: 36
End: 2021-03-17
Attending: HOSPITALIST
Payer: COMMERCIAL

## 2021-03-17 ENCOUNTER — APPOINTMENT (OUTPATIENT)
Dept: CT IMAGING | Facility: HOSPITAL | Age: 36
DRG: 176 | End: 2021-03-17
Attending: EMERGENCY MEDICINE
Payer: COMMERCIAL

## 2021-03-17 ENCOUNTER — HOSPITAL ENCOUNTER (INPATIENT)
Facility: HOSPITAL | Age: 36
LOS: 2 days | Discharge: HOME | DRG: 176 | End: 2021-03-19
Attending: EMERGENCY MEDICINE | Admitting: HOSPITALIST
Payer: COMMERCIAL

## 2021-03-17 ENCOUNTER — OFFICE VISIT (OUTPATIENT)
Dept: FAMILY MEDICINE CLINIC | Facility: CLINIC | Age: 36
End: 2021-03-17

## 2021-03-17 ENCOUNTER — TELEPHONE (OUTPATIENT)
Dept: FAMILY MEDICINE CLINIC | Facility: CLINIC | Age: 36
End: 2021-03-17

## 2021-03-17 VITALS
DIASTOLIC BLOOD PRESSURE: 72 MMHG | HEIGHT: 67 IN | HEART RATE: 112 BPM | SYSTOLIC BLOOD PRESSURE: 126 MMHG | RESPIRATION RATE: 16 BRPM | OXYGEN SATURATION: 99 % | BODY MASS INDEX: 40 KG/M2

## 2021-03-17 DIAGNOSIS — E87.2 METABOLIC ACIDOSIS: Primary | ICD-10-CM

## 2021-03-17 DIAGNOSIS — R06.83 SNORING: ICD-10-CM

## 2021-03-17 DIAGNOSIS — R79.89 LOW VITAMIN D LEVEL: ICD-10-CM

## 2021-03-17 DIAGNOSIS — G43.809 OTHER MIGRAINE WITHOUT STATUS MIGRAINOSUS, NOT INTRACTABLE: ICD-10-CM

## 2021-03-17 DIAGNOSIS — E28.39 PRIMARY OVARIAN FAILURE: ICD-10-CM

## 2021-03-17 DIAGNOSIS — Z86.16 HISTORY OF 2019 NOVEL CORONAVIRUS DISEASE (COVID-19): Primary | ICD-10-CM

## 2021-03-17 DIAGNOSIS — E78.00 ELEVATED CHOLESTEROL: ICD-10-CM

## 2021-03-17 DIAGNOSIS — E87.3 RESPIRATORY ALKALOSIS: ICD-10-CM

## 2021-03-17 DIAGNOSIS — F32.A ANXIETY AND DEPRESSION: ICD-10-CM

## 2021-03-17 DIAGNOSIS — R91.8 GROUND GLASS OPACITY PRESENT ON IMAGING OF LUNG: ICD-10-CM

## 2021-03-17 DIAGNOSIS — F41.9 ANXIETY AND DEPRESSION: ICD-10-CM

## 2021-03-17 DIAGNOSIS — E53.8 LOW VITAMIN B12 LEVEL: ICD-10-CM

## 2021-03-17 DIAGNOSIS — I26.99 ACUTE PULMONARY EMBOLISM WITHOUT ACUTE COR PULMONALE, UNSPECIFIED PULMONARY EMBOLISM TYPE (HCC): ICD-10-CM

## 2021-03-17 DIAGNOSIS — J18.9 COMMUNITY ACQUIRED PNEUMONIA, UNSPECIFIED LATERALITY: Primary | ICD-10-CM

## 2021-03-17 DIAGNOSIS — R06.09 PLATYPNEA-ORTHODEOXIA SYNDROME: Primary | ICD-10-CM

## 2021-03-17 PROBLEM — E87.20 METABOLIC ACIDOSIS: Status: ACTIVE | Noted: 2021-03-17

## 2021-03-17 LAB
ALBUMIN SERPL-MCNC: 3.9 G/DL (ref 3.4–5)
ALBUMIN/GLOB SERPL: 1 {RATIO} (ref 1–2)
ALLENS TEST: POSITIVE
ALLENS TEST: POSITIVE
ALP LIVER SERPL-CCNC: 88 U/L
ALT SERPL-CCNC: 23 U/L
ANION GAP SERPL CALC-SCNC: 9 MMOL/L (ref 0–18)
APAP SERPL-MCNC: <2 UG/ML (ref 10–30)
APTT PPP: 25.2 SECONDS (ref 25.4–36.1)
ARTERIAL BLD GAS O2 SATURATION: 97 % (ref 92–100)
ARTERIAL BLD GAS O2 SATURATION: 97 % (ref 92–100)
ARTERIAL BLOOD GAS BASE EXCESS: -5.9 MMOL/L (ref ?–2)
ARTERIAL BLOOD GAS BASE EXCESS: -6.4 MMOL/L (ref ?–2)
ARTERIAL BLOOD GAS HCO3: 12 MEQ/L (ref 22–26)
ARTERIAL BLOOD GAS HCO3: 13.1 MEQ/L (ref 22–26)
ARTERIAL BLOOD GAS PCO2: 15 MM HG (ref 35–45)
ARTERIAL BLOOD GAS PCO2: <14 MM HG (ref 35–45)
ARTERIAL BLOOD GAS PH: 7.56 (ref 7.35–7.45)
ARTERIAL BLOOD GAS PH: 7.6 (ref 7.35–7.45)
ARTERIAL BLOOD GAS PO2: 117 MM HG (ref 80–105)
ARTERIAL BLOOD GAS PO2: 124 MM HG (ref 80–105)
AST SERPL-CCNC: 8 U/L (ref 15–37)
BASOPHILS # BLD AUTO: 0.02 X10(3) UL (ref 0–0.2)
BASOPHILS NFR BLD AUTO: 0.2 %
BILIRUB SERPL-MCNC: 0.4 MG/DL (ref 0.1–2)
BUN BLD-MCNC: 13 MG/DL (ref 7–18)
BUN/CREAT SERPL: 14 (ref 10–20)
CALCIUM BLD-MCNC: 9.3 MG/DL (ref 8.5–10.1)
CALCULATED O2 SATURATION: 99 % (ref 92–100)
CALCULATED O2 SATURATION: 99 % (ref 92–100)
CARBOXYHEMOGLOBIN: 1.3 % SAT (ref 0–3)
CARBOXYHEMOGLOBIN: 1.3 % SAT (ref 0–3)
CHLORIDE SERPL-SCNC: 109 MMOL/L (ref 98–112)
CHOLEST SMN-MCNC: 138 MG/DL (ref ?–200)
CO2 SERPL-SCNC: 17 MMOL/L (ref 21–32)
CREAT BLD-MCNC: 0.93 MG/DL
DEPRECATED RDW RBC AUTO: 40.1 FL (ref 35.1–46.3)
EOSINOPHIL # BLD AUTO: 0.01 X10(3) UL (ref 0–0.7)
EOSINOPHIL NFR BLD AUTO: 0.1 %
ERYTHROCYTE [DISTWIDTH] IN BLOOD BY AUTOMATED COUNT: 13.4 % (ref 11–15)
GLOBULIN PLAS-MCNC: 4 G/DL (ref 2.8–4.4)
GLUCOSE BLD-MCNC: 110 MG/DL (ref 70–99)
HCT VFR BLD AUTO: 39.5 %
HDLC SERPL-MCNC: 55 MG/DL (ref 40–59)
HGB BLD-MCNC: 13.4 G/DL
IMM GRANULOCYTES # BLD AUTO: 0.07 X10(3) UL (ref 0–1)
IMM GRANULOCYTES NFR BLD: 0.8 %
IONIZED CALCIUM: 1.19 MMOL/L (ref 1.12–1.32)
LACTATE SERPL-SCNC: 3.2 MMOL/L (ref 0.4–2)
LACTIC ACID ARTERIAL: 4.5 MMOL/L (ref 0.5–2)
LDLC SERPL CALC-MCNC: 53 MG/DL (ref ?–100)
LYMPHOCYTES # BLD AUTO: 0.84 X10(3) UL (ref 1–4)
LYMPHOCYTES NFR BLD AUTO: 9.8 %
M PROTEIN MFR SERPL ELPH: 7.9 G/DL (ref 6.4–8.2)
MCH RBC QN AUTO: 27.9 PG (ref 26–34)
MCHC RBC AUTO-ENTMCNC: 33.9 G/DL (ref 31–37)
MCV RBC AUTO: 82.1 FL
METHEMOGLOBIN: 0.6 % SAT (ref 0.4–1.5)
METHEMOGLOBIN: 0.6 % SAT (ref 0.4–1.5)
MONOCYTES # BLD AUTO: 0.22 X10(3) UL (ref 0.1–1)
MONOCYTES NFR BLD AUTO: 2.6 %
NEUTROPHILS # BLD AUTO: 7.43 X10 (3) UL (ref 1.5–7.7)
NEUTROPHILS # BLD AUTO: 7.43 X10(3) UL (ref 1.5–7.7)
NEUTROPHILS NFR BLD AUTO: 86.5 %
NONHDLC SERPL-MCNC: 83 MG/DL (ref ?–130)
OSMOLALITY SERPL CALC.SUM OF ELEC: 281 MOSM/KG (ref 275–295)
P/F RATIO: 577.5 MMHG
P/F RATIO: 590.7 MMHG
PATIENT FASTING Y/N/NP: NO
PATIENT FASTING Y/N/NP: NO
PATIENT TEMPERATURE: 97.4 F
PATIENT TEMPERATURE: 98.6 F
PLATELET # BLD AUTO: 246 10(3)UL (ref 150–450)
POTASSIUM BLOOD GAS: 3.7 MMOL/L (ref 3.6–5.1)
POTASSIUM SERPL-SCNC: 4.1 MMOL/L (ref 3.5–5.1)
PROCALCITONIN SERPL-MCNC: <0.05 NG/ML (ref ?–0.16)
RBC # BLD AUTO: 4.81 X10(6)UL
SALICYLATES SERPL-MCNC: <1.7 MG/DL (ref 2.8–20)
SARS-COV-2 RNA RESP QL NAA+PROBE: NOT DETECTED
SODIUM BLOOD GAS: 138 MMOL/L (ref 136–144)
SODIUM SERPL-SCNC: 135 MMOL/L (ref 136–145)
TOTAL HEMOGLOBIN: 13.4 G/DL
TOTAL HEMOGLOBIN: 13.7 G/DL
TRIGL SERPL-MCNC: 150 MG/DL (ref 30–149)
VIT B12 SERPL-MCNC: 511 PG/ML (ref 193–986)
VIT D+METAB SERPL-MCNC: 31.8 NG/ML (ref 30–100)
VLDLC SERPL CALC-MCNC: 30 MG/DL (ref 0–30)
WBC # BLD AUTO: 8.6 X10(3) UL (ref 4–11)

## 2021-03-17 PROCEDURE — 82607 VITAMIN B-12: CPT

## 2021-03-17 PROCEDURE — 3074F SYST BP LT 130 MM HG: CPT | Performed by: FAMILY MEDICINE

## 2021-03-17 PROCEDURE — 36415 COLL VENOUS BLD VENIPUNCTURE: CPT

## 2021-03-17 PROCEDURE — 86329 IMMUNODIFFUSION NES: CPT

## 2021-03-17 PROCEDURE — 82803 BLOOD GASES ANY COMBINATION: CPT

## 2021-03-17 PROCEDURE — 99223 1ST HOSP IP/OBS HIGH 75: CPT | Performed by: INTERNAL MEDICINE

## 2021-03-17 PROCEDURE — 83050 HGB METHEMOGLOBIN QUAN: CPT

## 2021-03-17 PROCEDURE — 80053 COMPREHEN METABOLIC PANEL: CPT

## 2021-03-17 PROCEDURE — 99291 CRITICAL CARE FIRST HOUR: CPT | Performed by: NURSE PRACTITIONER

## 2021-03-17 PROCEDURE — 3078F DIAST BP <80 MM HG: CPT | Performed by: INTERNAL MEDICINE

## 2021-03-17 PROCEDURE — 71260 CT THORAX DX C+: CPT | Performed by: EMERGENCY MEDICINE

## 2021-03-17 PROCEDURE — 82375 ASSAY CARBOXYHB QUANT: CPT

## 2021-03-17 PROCEDURE — 99495 TRANSJ CARE MGMT MOD F2F 14D: CPT | Performed by: FAMILY MEDICINE

## 2021-03-17 PROCEDURE — 85018 HEMOGLOBIN: CPT

## 2021-03-17 PROCEDURE — 82306 VITAMIN D 25 HYDROXY: CPT

## 2021-03-17 PROCEDURE — 82784 ASSAY IGA/IGD/IGG/IGM EACH: CPT

## 2021-03-17 PROCEDURE — 3008F BODY MASS INDEX DOCD: CPT | Performed by: INTERNAL MEDICINE

## 2021-03-17 PROCEDURE — 36600 WITHDRAWAL OF ARTERIAL BLOOD: CPT

## 2021-03-17 PROCEDURE — 3078F DIAST BP <80 MM HG: CPT | Performed by: FAMILY MEDICINE

## 2021-03-17 PROCEDURE — 80061 LIPID PANEL: CPT

## 2021-03-17 PROCEDURE — 3074F SYST BP LT 130 MM HG: CPT | Performed by: INTERNAL MEDICINE

## 2021-03-17 RX ORDER — CETIRIZINE HYDROCHLORIDE 5 MG/1
5 TABLET ORAL DAILY
Status: DISCONTINUED | OUTPATIENT
Start: 2021-03-18 | End: 2021-03-19

## 2021-03-17 RX ORDER — HYDROXYZINE HYDROCHLORIDE 25 MG/1
25 TABLET, FILM COATED ORAL 2 TIMES DAILY PRN
Status: DISCONTINUED | OUTPATIENT
Start: 2021-03-17 | End: 2021-03-19

## 2021-03-17 RX ORDER — HYDROXYZINE HYDROCHLORIDE 25 MG/1
25 TABLET, FILM COATED ORAL 2 TIMES DAILY PRN
Qty: 60 TABLET | Refills: 0 | Status: SHIPPED | OUTPATIENT
Start: 2021-03-17 | End: 2021-04-28

## 2021-03-17 RX ORDER — FLUOXETINE HYDROCHLORIDE 20 MG/1
40 CAPSULE ORAL DAILY
Status: DISCONTINUED | OUTPATIENT
Start: 2021-03-18 | End: 2021-03-19

## 2021-03-17 RX ORDER — ROSUVASTATIN CALCIUM 10 MG/1
10 TABLET, COATED ORAL NIGHTLY
Status: DISCONTINUED | OUTPATIENT
Start: 2021-03-17 | End: 2021-03-19

## 2021-03-17 RX ORDER — FLUOXETINE HYDROCHLORIDE 40 MG/1
40 CAPSULE ORAL DAILY
Qty: 30 CAPSULE | Refills: 0 | Status: SHIPPED | OUTPATIENT
Start: 2021-03-17 | End: 2021-04-28

## 2021-03-17 RX ORDER — NORETHINDRONE ACETATE AND ETHINYL ESTRADIOL 1MG-20(21)
1 KIT ORAL DAILY
Status: DISCONTINUED | OUTPATIENT
Start: 2021-03-18 | End: 2021-03-17

## 2021-03-17 RX ORDER — HEPARIN SODIUM AND DEXTROSE 10000; 5 [USP'U]/100ML; G/100ML
INJECTION INTRAVENOUS CONTINUOUS
Status: DISPENSED | OUTPATIENT
Start: 2021-03-18 | End: 2021-03-18

## 2021-03-17 RX ORDER — HEPARIN SODIUM AND DEXTROSE 10000; 5 [USP'U]/100ML; G/100ML
18 INJECTION INTRAVENOUS ONCE
Status: COMPLETED | OUTPATIENT
Start: 2021-03-17 | End: 2021-03-18

## 2021-03-17 RX ORDER — ACETAMINOPHEN 325 MG/1
650 TABLET ORAL EVERY 6 HOURS PRN
Status: DISCONTINUED | OUTPATIENT
Start: 2021-03-17 | End: 2021-03-19

## 2021-03-17 RX ORDER — LORAZEPAM 2 MG/ML
1 INJECTION INTRAMUSCULAR ONCE
Status: COMPLETED | OUTPATIENT
Start: 2021-03-17 | End: 2021-03-17

## 2021-03-17 RX ORDER — ALBUTEROL SULFATE 90 UG/1
2 AEROSOL, METERED RESPIRATORY (INHALATION) EVERY 6 HOURS PRN
Status: DISCONTINUED | OUTPATIENT
Start: 2021-03-17 | End: 2021-03-19

## 2021-03-17 RX ORDER — SODIUM PHOSPHATE, DIBASIC AND SODIUM PHOSPHATE, MONOBASIC 7; 19 G/133ML; G/133ML
1 ENEMA RECTAL ONCE AS NEEDED
Status: DISCONTINUED | OUTPATIENT
Start: 2021-03-17 | End: 2021-03-19

## 2021-03-17 RX ORDER — ALBUTEROL SULFATE 90 UG/1
2 AEROSOL, METERED RESPIRATORY (INHALATION) EVERY 6 HOURS PRN
Status: DISCONTINUED | OUTPATIENT
Start: 2021-03-17 | End: 2021-03-17

## 2021-03-17 RX ORDER — KETOROLAC TROMETHAMINE 15 MG/ML
30 INJECTION, SOLUTION INTRAMUSCULAR; INTRAVENOUS EVERY 6 HOURS PRN
Status: DISCONTINUED | OUTPATIENT
Start: 2021-03-17 | End: 2021-03-19

## 2021-03-17 RX ORDER — POLYETHYLENE GLYCOL 3350 17 G/17G
17 POWDER, FOR SOLUTION ORAL DAILY PRN
Status: DISCONTINUED | OUTPATIENT
Start: 2021-03-17 | End: 2021-03-19

## 2021-03-17 RX ORDER — HEPARIN SODIUM 5000 [USP'U]/ML
80 INJECTION INTRAVENOUS; SUBCUTANEOUS ONCE
Status: COMPLETED | OUTPATIENT
Start: 2021-03-17 | End: 2021-03-17

## 2021-03-17 RX ORDER — ONDANSETRON 2 MG/ML
4 INJECTION INTRAMUSCULAR; INTRAVENOUS EVERY 6 HOURS PRN
Status: DISCONTINUED | OUTPATIENT
Start: 2021-03-17 | End: 2021-03-19

## 2021-03-17 RX ORDER — MELATONIN
3 NIGHTLY PRN
Status: DISCONTINUED | OUTPATIENT
Start: 2021-03-17 | End: 2021-03-19

## 2021-03-17 RX ORDER — BISACODYL 10 MG
10 SUPPOSITORY, RECTAL RECTAL
Status: DISCONTINUED | OUTPATIENT
Start: 2021-03-17 | End: 2021-03-19

## 2021-03-17 NOTE — PROGRESS NOTES
HPI:    Virgen Nicholas is a 38year old female here today for hospital follow up.    She was discharged from Inpatient hospital, [facility] to Home   Admission Date: 3/9/21   Discharge Date: 3/13/21  Hospital Discharge Diagnoses (since 2/1 Place 1 drop into both eyes 2 (two) times daily. Phentermine HCl 37.5 MG Oral Tab, Take 1 tablet (37.5 mg total) by mouth every morning before breakfast.  ergocalciferol 1.25 MG (92159 UT) Oral Cap, Take 1 cap twice weekly for 6 months.   Justice Burrows denies any unusual skin lesions  EYES: denies blurred vision or double vision  HEENT: denies nasal congestion, sinus pain or ST  LUNGS: denies shortness of breath with exertion  CARDIOVASCULAR: denies chest pain on exertion or palpitations  GI: denies abdo hydrOXYzine HCl 25 MG Oral Tab; Take 1 tablet (25 mg total) by mouth 2 (two) times daily as needed for Anxiety.  -     FLUoxetine HCl 40 MG Oral Cap; Take 1 capsule (40 mg total) by mouth daily.     Snoring- referral for sleep study after inpatient desa

## 2021-03-17 NOTE — H&P
[facility] HOSPITALIST  History and Physical     Yaya Nguyễn Patient Status:  Emergency    10/15/1985 MRN [id number]   Location [facility] [facility] Attending Raleigh Cox MD   Hosp Day # 0 PCP Leslie Jain DO     Chief Co BIOPSY, POSSIBLE POLYPECTOMY 99659 N/A 8/13/2018    Performed by Dea Nolasco MD at [address] [place]  2/11/2019    Performed by Analisa Dasilva MD at [facility] N/A 12/17/2018    P (SEE COMMENTS)    Comment:Sinus congestion  Sumatriptan             NAUSEA AND VOMITING  Tramadol                NAUSEA AND VOMITING, OTHER (SEE                            COMMENTS)    Comment:Migraine  Wellbutrin Xl [[place]*    RASH  Hydrocodone-Acetami* g, Rfl: 1  albuterol sulfate (2.5 MG/3ML) 0.083% Inhalation Nebu Soln, Take 3 mL (2.5 mg total) by nebulization every 4 (four) hours as needed for Wheezing., Disp: 1 Box, Rfl: 0  Norethin Ace-Eth Estrad-FE 1-20 MG-MCG Oral Tab, Take 1 tablet by mouth daily GFRNAA 79   CA 9.3   ALB 3.9   *   K 4.1      CO2 17.0*   ALKPHO 88   AST 8*   ALT 23   BILT 0.4   TP 7.9       Estimated Creatinine Clearance: 80.5 mL/min (based on SCr of 0.93 mg/dL).     Recent Labs   Lab 03/12/21  0650   PTP 14.5   INR 1.1

## 2021-03-17 NOTE — TELEPHONE ENCOUNTER
[facility], [facility] [address] Clinical Staff  Cc: ARNULFO M[facility] [place] Referral Pool  Phone Number: [phone]   . Reason for the order/referral:PULMONOLOGY/ [address] F/UP   PCP: Michelle Cedeno   Refer to Provider (Sarah Palacio   Patient In

## 2021-03-18 ENCOUNTER — APPOINTMENT (OUTPATIENT)
Dept: ULTRASOUND IMAGING | Facility: HOSPITAL | Age: 36
DRG: 176 | End: 2021-03-18
Attending: NURSE PRACTITIONER
Payer: COMMERCIAL

## 2021-03-18 ENCOUNTER — APPOINTMENT (OUTPATIENT)
Dept: CV DIAGNOSTICS | Facility: HOSPITAL | Age: 36
DRG: 176 | End: 2021-03-18
Attending: NURSE PRACTITIONER
Payer: COMMERCIAL

## 2021-03-18 PROBLEM — R06.02 SOB (SHORTNESS OF BREATH): Status: ACTIVE | Noted: 2021-03-09

## 2021-03-18 PROBLEM — E87.2 LACTIC ACIDOSIS: Status: ACTIVE | Noted: 2021-03-17

## 2021-03-18 PROBLEM — E87.20 LACTIC ACIDOSIS: Status: ACTIVE | Noted: 2021-03-17

## 2021-03-18 LAB
ALLENS TEST: POSITIVE
ANION GAP SERPL CALC-SCNC: 6 MMOL/L (ref 0–18)
APTT PPP: 165 SECONDS (ref 25.4–36.1)
APTT PPP: 92.6 SECONDS (ref 25.4–36.1)
ARTERIAL BLD GAS O2 SATURATION: 96 % (ref 92–100)
ARTERIAL BLOOD GAS BASE EXCESS: -5.6 MMOL/L (ref ?–2)
ARTERIAL BLOOD GAS HCO3: 16.4 MEQ/L (ref 22–26)
ARTERIAL BLOOD GAS PCO2: 23 MM HG (ref 35–45)
ARTERIAL BLOOD GAS PH: 7.48 (ref 7.35–7.45)
ARTERIAL BLOOD GAS PO2: 109 MM HG (ref 80–105)
BASOPHILS # BLD AUTO: 0.02 X10(3) UL (ref 0–0.2)
BASOPHILS NFR BLD AUTO: 0.2 %
BUN BLD-MCNC: 11 MG/DL (ref 7–18)
BUN/CREAT SERPL: 17.2 (ref 10–20)
CALCIUM BLD-MCNC: 8.1 MG/DL (ref 8.5–10.1)
CALCULATED O2 SATURATION: 99 % (ref 92–100)
CARBOXYHEMOGLOBIN: 1.4 % SAT (ref 0–3)
CHLORIDE SERPL-SCNC: 110 MMOL/L (ref 98–112)
CO2 SERPL-SCNC: 22 MMOL/L (ref 21–32)
CREAT BLD-MCNC: 0.64 MG/DL
D-DIMER: 0.43 UG/ML FEU (ref ?–0.5)
DEPRECATED HBV CORE AB SER IA-ACNC: 56.6 NG/ML
DEPRECATED RDW RBC AUTO: 41.3 FL (ref 35.1–46.3)
EOSINOPHIL # BLD AUTO: 0.1 X10(3) UL (ref 0–0.7)
EOSINOPHIL NFR BLD AUTO: 1.2 %
ERYTHROCYTE [DISTWIDTH] IN BLOOD BY AUTOMATED COUNT: 13.7 % (ref 11–15)
FIO2: 21 %
GLUCOSE BLD-MCNC: 106 MG/DL (ref 70–99)
HCT VFR BLD AUTO: 34.1 %
HGB BLD-MCNC: 11.4 G/DL
IMM GRANULOCYTES # BLD AUTO: 0.07 X10(3) UL (ref 0–1)
IMM GRANULOCYTES NFR BLD: 0.8 %
IRON SATURATION: 14 %
IRON SERPL-MCNC: 56 UG/DL
LACTATE SERPL-SCNC: 1.7 MMOL/L (ref 0.4–2)
LACTATE SERPL-SCNC: 2.4 MMOL/L (ref 0.4–2)
LYMPHOCYTES # BLD AUTO: 2.62 X10(3) UL (ref 1–4)
LYMPHOCYTES NFR BLD AUTO: 30.6 %
MCH RBC QN AUTO: 27.9 PG (ref 26–34)
MCHC RBC AUTO-ENTMCNC: 33.4 G/DL (ref 31–37)
MCV RBC AUTO: 83.4 FL
METHEMOGLOBIN: 0.6 % SAT (ref 0.4–1.5)
MONOCYTES # BLD AUTO: 0.68 X10(3) UL (ref 0.1–1)
MONOCYTES NFR BLD AUTO: 7.9 %
NEUTROPHILS # BLD AUTO: 5.07 X10 (3) UL (ref 1.5–7.7)
NEUTROPHILS # BLD AUTO: 5.07 X10(3) UL (ref 1.5–7.7)
NEUTROPHILS NFR BLD AUTO: 59.3 %
OSMOLALITY SERPL CALC.SUM OF ELEC: 286 MOSM/KG (ref 275–295)
PATIENT TEMPERATURE: 98.2 F
PLATELET # BLD AUTO: 210 10(3)UL (ref 150–450)
POTASSIUM SERPL-SCNC: 4.1 MMOL/L (ref 3.5–5.1)
RBC # BLD AUTO: 4.09 X10(6)UL
SARS-COV-2 RNA RESP QL NAA+PROBE: NOT DETECTED
SODIUM SERPL-SCNC: 138 MMOL/L (ref 136–145)
TOTAL HEMOGLOBIN: 11.4 G/DL
TOTAL IRON BINDING CAPACITY: 392 UG/DL (ref 240–450)
TRANSFERRIN SERPL-MCNC: 263 MG/DL (ref 200–360)
WBC # BLD AUTO: 8.6 X10(3) UL (ref 4–11)

## 2021-03-18 PROCEDURE — 99254 IP/OBS CNSLTJ NEW/EST MOD 60: CPT | Performed by: NURSE PRACTITIONER

## 2021-03-18 PROCEDURE — 93306 TTE W/DOPPLER COMPLETE: CPT | Performed by: NURSE PRACTITIONER

## 2021-03-18 PROCEDURE — 99232 SBSQ HOSP IP/OBS MODERATE 35: CPT | Performed by: HOSPITALIST

## 2021-03-18 PROCEDURE — 93970 EXTREMITY STUDY: CPT | Performed by: NURSE PRACTITIONER

## 2021-03-18 NOTE — CM/SW NOTE
03/18/21 1200   CM/SW Referral Data   Referral Source    Reason for Referral Discharge planning   Informant Patient   Readmission Assessment   Pt's level of independence at discharge?  No assist/independent (minimal)   Pt's living situation p

## 2021-03-18 NOTE — PLAN OF CARE
Received pt from ER at around 2030. Critical Care AP[facility]IDA [facility] at bedside to evaluate. Alert and oriented, intermittently anxious. Upon admission pt was tachypneic and short of breath. PRN atarax given. Able to maintain saturations  on room air.  Jennifer Farley

## 2021-03-18 NOTE — CONSULTS
Hem/Onc Report of Consultation    Patient Name: Tamika Yoo   YOB: 1985   Medical Record Number: [id number]   CSN: [id number]   Consulting Physician: Dr. David Nichols  Referring Provider(s): Dr. Tessie Hooper  Date of Consultation: 3/18/2 POSSIBLE POLYPECTOMY 87502 N/A 8/13/2018    Performed by Samaria Flowers MD at [address] 2/11/2019    Performed by Marvin Salas MD at [facility] N/A 12/17/2018    Performed Vaping Use      Vaping Use: Never used    Substance and Sexual Activity      Alcohol use:  Yes        Alcohol/week: 0.0 standard drinks        Comment: socially      Drug use: No      Sexual activity: Yes        Partners: Male        Birth control/protecti (OMNIPAQUE) 350 MG/ML injection 100 mL, 100 mL, Intravenous, ONCE PRN  [COMPLETED] Heparin Sodium (Porcine) 5000 UNIT/ML injection 9,300 Units, 80 Units/kg, Intravenous, Once  [COMPLETED] heparin (PORCINE) 36411ixjza/250mL infusion ED INITIAL DOSE, 18 Unit mg  [COMPLETED] iohexol (OMNIPAQUE) 350 MG/ML injection 100 mL  [] 0.9% NaCl infusion  [COMPLETED] cefTRIAXone Sodium (ROCEPHIN) 2 g in sodium chloride 0.9% 100 mL IVPB-ADDV  [COMPLETED] Perflutren Lipid Microsphere (DEFINITY) 6.52 MG/ML injection 1 Systems:  A comprehensive 14 point review of systems was completed. Pertinent positives and negatives noted in the the HPI.     Allergies:  Shellfish Allergy       ANAPHYLAXIS    Comment:epiglottitis  Shellfish-Derived P*    ANAPHYLAXIS  Avelox [Moxifloxac Radiology:  PROCEDURE:  CT CHEST PE AORTA (IV ONLY) (CPT=71260)       COMPARISON:  [facility] , CT, CT ANGIOGRAPHY, CHEST (CPT=71275), 3/09/2021, 7:24 PM.       INDICATIONS:  ADMITTED LAST WEEK FOR ASTHMA EXACERBATION - SEVERE RESPIRATORY ALKALOSIS - TACHY pneumonia. 3. Fatty infiltration of liver and splenomegaly again demonstrated   4. 4 mm right upper lobe pulmonary nodule previously measured 6 mm.  2017 guidelines from the [address] [place] [address] for the follow-up and management of incidentally detected inde (PTL=09103) (Order #[id number]) on 3/18/2021 - Order Result History Report   Signed by    Signed Time Phone Pager   Terese Villegas MD 3/18/2021 08:56 [phone]          Impression/Plan    Provoked small PE: no right heart strain, Dopplers negative

## 2021-03-18 NOTE — PROGRESS NOTES
[facility] HOSPITALIST  Progress note     Josh Jordan Patient Status:  Emergency    10/15/1985 MRN [id number]   Location [address] [facility] Attending Arsh Blue MD   Hosp Day # 1 PCP Medardo Nolasco DO     Chief Complaint echo pending. Hem/onc and pulm to see  2. resp alkalosis 2/2 PE  3. Metabolic, NAG, acidosis-resolved; compensation? Topiramate? 4. Elevated lactate - resolved  5. Asthma - not in exacerbation  1. PTA inhalers  6.  Pulmonary nodule - improved from prior;

## 2021-03-18 NOTE — ED PROVIDER NOTES
Patient Seen in: [facility] 4sw-a      History   Patient presents with:  Difficulty Breathing    Stated Complaint: ADMITTED LAST WEEK FOR ASTHMA EXACERBATION - SEVERE RESPIRATORY ALKALOSIS - TAC*    HPI/Subjective:   HPI    26-year-old female sent t • LUMBAR EPIDURAL N/A 11/19/2018    Performed by Naun Rowley MD at [address] [place] [address] N/A 5/1/2018    Performed by Naun Rowley MD at [address] [place] [address] N/A 4/16/2018    Performed clear without wheeze. Tachypneic. Moderate respiratory distress. Heart: regular rate rhythm and no murmur   Abdomen: Soft and nontender. No abdominal masses.   No peritoneal signs   Extremities: no edema, normal peripheral pulses   Neuro: Alert oriented (ALINITY)          CT CHEST PE AORTA (IV ONLY) (CPT=71260)    Result Date: 3/17/2021  PROCEDURE:  CT CHEST PE AORTA (IV ONLY) (CPT=71260)  COMPARISON:  [facility] , CT, CT ANGIOGRAPHY, CHEST (CPT=71275), 3/09/2021, 7:24 PM.  INDICATIONS:  ADMITTED LAST WEEK FO infiltration of liver and splenomegaly again demonstrated 4. 4 mm right upper lobe pulmonary nodule previously measured 6 mm.  2017 guidelines from the [address] [place] [address] for the follow-up and management of incidentally detected indeterminate pulmonary nodu Discussed with pulmonary medicine who will follow. Unclear etiology of symptoms at this time. We considered medication overdose including aspirin. Aspirin level is negative at this time. Repeat ABG shows slightly worsening alkalosis.   Patient started o

## 2021-03-18 NOTE — CONSULTS
[facility] Consult Note  [facility]  Report of Consultation    Jarrededie Morris Patient Status:  Inpatient    10/15/1985 MRN [id number]   [facility] 4SW-A Attending Bettye Kwong MD   H Matthew Hogan MD at [address] N/A 10/7/2019    Performed by Matthew Hogan MD at [address]   • OTHER SURGICAL HISTORY      Deviated septum   • REPAIR R 3350, magnesium hydroxide, bisacodyl, Fleet Enema, hydrOXYzine HCl, Albuterol Sulfate HFA, Ketorolac Tromethamine    Review of Systems:   Constitutional: Negative for anorexia, chills, fatigue, fevers, malaise, night sweats and weight loss.   Eyes: Negative 100 % — —   03/17/21 2200 133/78 — — 83 (!) 27 98 % — —   03/17/21 2100 120/75 — — 87 (!) 28 98 % — —   03/17/21 2040 128/75 98.1 °F (36.7 °C) Temporal 86 (!) 28 97 % — —   03/17/21 2012 (!) 141/94 — — 88 26 99 % — —   03/17/21 1920 (!) 162/102 — — 93 26 9 results for input(s): CRP, JEAN PIERRE, DDIMER in the last 168 hours.     Other Labs: procal <0.05    ABG:     Recent Labs   Lab 03/17/21 2011   ABGPHT 7.60*   VSDJUH0P <14*   HZTNA9H 117*   ABGHCO3 12.0*   ABGBE -6.4*   TEMP 97.4   CORAL Positive   SITE Left Radi to PE. Pt has mild metabolic alkalosis which could be from topamax. Repeat ABG with less alkalosis. Hold topamax given this leads to nagma  · Continue herparin ggt. · She is on ocp for ovarian failure. Appreciate heme c/s.  May need to consider ocp with lo

## 2021-03-18 NOTE — ED NOTES
Patient returned from CT, co severe left lung pain after contrast injection. States pain happened after last CT but not as severe.

## 2021-03-18 NOTE — PROGRESS NOTES
ICU  Critical Care APRN Progress Note    NAME: Tawnya Scott - [facility] - MRN: [id number] - Age: 38year old - :10/15/1985    History Of Present Illness:  Tawnya Scott is a 38year old female with PMHx significant for GERD ovarian failure    • Reactive airway disease    • Shortness of breath        Social Hx:  Social History    Tobacco Use      Smoking status: Former Smoker        Packs/day: 1.00        Years: 15.00        Pack years: 15      Smokeless tobacco: Former User strength    Data this admission:  CT ANGIOGRAPHY, CHEST (CPT=71275)    Result Date: 3/9/2021  CONCLUSION:   1. No CT evidence of acute pulmonary embolism or acute aortic dissection.   2. Mild patchy diffuse ground-glass opacity in the lungs could represent Finalized by (CST): Liliana Daly MD on 3/12/2021 at 1:57 PM       XR CHEST AP PORTABLE  (CPT=71045)    Result Date: 3/9/2021  CONCLUSION:  No active cardiopulmonary process identified.     Dictated by (CST): Gillian Ramsey MD on 3/09/2021 at 6:30 PM Respiratory distress 2/2 PE, asthma   -Bronch last week  -LE doppler and Echo tomorrow  -Heparin gtt  -Stop BCP for now  -BiPAP PRN for work of breathing  -Check PCR so Nebulizers can be given (rapid negative)  -Continue Breo Ellipta, add Incruse Ellipta

## 2021-03-18 NOTE — PLAN OF CARE
Alert and oriented x 3. PRN migraine medicine and anti-anxiety medicine. Echo completed. Patient has PE, on heparin gtt. Therapeutic per am ptt. Hem/Onc consulted. Plan to start patient on PO Eliquis tonight and stop heparin gtt at that time.  Vitals stable

## 2021-03-19 VITALS
HEART RATE: 80 BPM | WEIGHT: 253.5 LBS | HEIGHT: 67 IN | BODY MASS INDEX: 39.79 KG/M2 | DIASTOLIC BLOOD PRESSURE: 66 MMHG | TEMPERATURE: 98 F | OXYGEN SATURATION: 100 % | RESPIRATION RATE: 19 BRPM | SYSTOLIC BLOOD PRESSURE: 108 MMHG

## 2021-03-19 LAB
ANION GAP SERPL CALC-SCNC: 6 MMOL/L (ref 0–18)
APTT PPP: 87.6 SECONDS (ref 25.4–36.1)
BASOPHILS # BLD AUTO: 0.02 X10(3) UL (ref 0–0.2)
BASOPHILS NFR BLD AUTO: 0.3 %
BETA 2 GLYCOPROTEIN 1 AB, IGG: <0.6 U/ML (ref ?–7)
BETA 2 GLYCOPROTEIN 1 AB, IGM: <2.9 U/ML (ref ?–7)
BUN BLD-MCNC: 13 MG/DL (ref 7–18)
BUN/CREAT SERPL: 18.8 (ref 10–20)
CALCIUM BLD-MCNC: 8.3 MG/DL (ref 8.5–10.1)
CARDIOLIPIN IGG SERPL-ACNC: <0.5 GPL (ref ?–10)
CARDIOLIPIN IGM SERPL-ACNC: 1.3 MPL (ref ?–10)
CHLORIDE SERPL-SCNC: 109 MMOL/L (ref 98–112)
CO2 SERPL-SCNC: 24 MMOL/L (ref 21–32)
CREAT BLD-MCNC: 0.69 MG/DL
DEPRECATED RDW RBC AUTO: 43.2 FL (ref 35.1–46.3)
DRVVT LUPUS ANTICOAGULANT: NEGATIVE
DRVVT SCREEN RATIO: 0.85 (ref 0–1.29)
EOSINOPHIL # BLD AUTO: 0.16 X10(3) UL (ref 0–0.7)
EOSINOPHIL NFR BLD AUTO: 2.7 %
ERYTHROCYTE [DISTWIDTH] IN BLOOD BY AUTOMATED COUNT: 13.6 % (ref 11–15)
F2 C.20210G>A GENO BLD/T: NORMAL
F5 P.R506Q BLD/T QL: NORMAL
GLUCOSE BLD-MCNC: 130 MG/DL (ref 70–99)
HCT VFR BLD AUTO: 33.6 %
HGB BLD-MCNC: 10.9 G/DL
IMM GRANULOCYTES # BLD AUTO: 0.04 X10(3) UL (ref 0–1)
IMM GRANULOCYTES NFR BLD: 0.7 %
LYMPHOCYTES # BLD AUTO: 1.81 X10(3) UL (ref 1–4)
LYMPHOCYTES NFR BLD AUTO: 30.6 %
MCH RBC QN AUTO: 28.2 PG (ref 26–34)
MCHC RBC AUTO-ENTMCNC: 32.4 G/DL (ref 31–37)
MCV RBC AUTO: 87 FL
MONOCYTES # BLD AUTO: 0.53 X10(3) UL (ref 0.1–1)
MONOCYTES NFR BLD AUTO: 9 %
NEUTROPHILS # BLD AUTO: 3.35 X10 (3) UL (ref 1.5–7.7)
NEUTROPHILS # BLD AUTO: 3.35 X10(3) UL (ref 1.5–7.7)
NEUTROPHILS NFR BLD AUTO: 56.7 %
OSMOLALITY SERPL CALC.SUM OF ELEC: 290 MOSM/KG (ref 275–295)
PLATELET # BLD AUTO: 181 10(3)UL (ref 150–450)
POTASSIUM SERPL-SCNC: 4.6 MMOL/L (ref 3.5–5.1)
PT(LUPUS): 15.2 SECONDS (ref 12.1–14.7)
RBC # BLD AUTO: 3.86 X10(6)UL
SODIUM SERPL-SCNC: 139 MMOL/L (ref 136–145)
STACLOT LA DELTA: 4.4 SECONDS (ref ?–8)
STACLOT LA: NEGATIVE
WBC # BLD AUTO: 5.9 X10(3) UL (ref 4–11)

## 2021-03-19 PROCEDURE — 99239 HOSP IP/OBS DSCHRG MGMT >30: CPT | Performed by: HOSPITALIST

## 2021-03-19 RX ORDER — NORETHINDRONE ACETATE AND ETHINYL ESTRADIOL 1MG-20(21)
1 KIT ORAL DAILY
Qty: 3 PACKAGE | Refills: 3 | Status: SHIPPED
Start: 2021-03-19 | End: 2021-03-19

## 2021-03-19 RX ORDER — NORETHINDRONE ACETATE AND ETHINYL ESTRADIOL 1MG-20(21)
1 KIT ORAL DAILY
Qty: 3 PACKAGE | Refills: 3 | Status: SHIPPED | OUTPATIENT
Start: 2021-03-19 | End: 2021-11-16

## 2021-03-19 NOTE — PLAN OF CARE
Received pt at change of shift alert and oriented x4. On room air. Pt states her breathing is alot better than yesterday. C/o slight headache. Brandon Glenburn early today with improvement in migraine. Up to bathroom with minimal assist.  VSS. Afeb.   Star

## 2021-03-19 NOTE — PLAN OF CARE
Received pt from ICU. Alert. Vss. No c/o. No sob. Pt states feeling much better today and hopeful for dishcharge. Will cont to monitor.

## 2021-03-19 NOTE — PROGRESS NOTES
[facility] Pulmonary/Critical Care Progress Note     SUBJECTIVE/Interval history: All events, procedures, notes reviewed. Pt feels well. Sob less.  Less tachypnea    Review of Systems:   A comprehensive 14 point review o 91 132 129   GFRNAA 79 114 112   CA 9.3 8.1* 8.3*   * 138 139   K 4.1 4.1 4.6    110 109   CO2 17.0* 22.0 24.0     Recent Labs   Lab 03/17/21  1756 03/18/21  0313 03/19/21  0328   RBC 4.81 4.09 3.86   HGB 13.4 11.4* 10.9*   HCT 39.5 34.1* 33.6* guidelines from the [address] [place] [address] for the follow-up and management of incidentally detected indeterminate pulmonary nodules in persons at least 28years of age depend on nodule  size (average length and width) and underlying risk factors (including sm

## 2021-03-19 NOTE — PLAN OF CARE
Pt discharged per wc per pct. No c/o and in no apparent distress. Vss. Reviewed avs at length with pt and pt's mom. Mom to drive home.

## 2021-03-20 LAB
IMMUNOGLOBULIN A: 260 MG/DL
Lab: 182 MG/DL
Lab: 51 MG/DL

## 2021-03-20 NOTE — DISCHARGE SUMMARY
[facility] PSYCHIATRIC [place] HOSPITALIST  DISCHARGE SUMMARY     Tana Ndiaye Patient Status:  Inpatient    10/15/1985 MRN [id number]   [facility] 7NE-A Attending No att. 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 Will f/u with endo, hem/onc, ob/ygn.     Lace+ Score: 74  59-90 High Risk  29-58 Medium Risk  0-28   Low Risk         TCM Follow-Up Rec capsule  Refills: 5     FLUoxetine HCl 40 MG Caps  Commonly known as: PROZAC      Take 1 capsule (40 mg total) by mouth daily.    Quantity: 30 capsule  Refills: 0     Fluticasone Furoate-Vilanterol 200-25 MCG/INH Aepb  Commonly known as: BREO ELLIPTA      I Tabs  · Norethin Ace-Eth Estrad-FE 1-20 MG-MCG Tabs         [place]    Follow-up appointment:   Henrik Troy MD  [address] [place]  [phone]    Go on 4/8/2021  at 11 AM    [facility] [place], Antelmo Camp MD    Patient Instructions:         Location Instructions:     **IF YOU NEED LABWORK OR AN INFUSION ALONG WITH YOUR APPOINTMENT, YOU MUST CALL TO SCHEDULE.**  Your appointment is on the [facility] cam chart    Tasha Gray MD    Time spent:  > 30 minutes

## 2021-03-22 ENCOUNTER — PATIENT OUTREACH (OUTPATIENT)
Dept: CASE MANAGEMENT | Age: 36
End: 2021-03-22

## 2021-03-22 NOTE — PROGRESS NOTES
Attempted to reach the patient to complete a Hospital FU call. Left a message for the pt to call [facility] back at, [phone].

## 2021-03-23 ENCOUNTER — OFFICE VISIT (OUTPATIENT)
Dept: SLEEP CENTER | Age: 36
End: 2021-03-23
Attending: INTERNAL MEDICINE
Payer: COMMERCIAL

## 2021-03-23 ENCOUNTER — TELEPHONE (OUTPATIENT)
Dept: FAMILY MEDICINE CLINIC | Facility: CLINIC | Age: 36
End: 2021-03-23

## 2021-03-23 DIAGNOSIS — R06.83 SNORING: ICD-10-CM

## 2021-03-23 PROCEDURE — 95810 POLYSOM 6/> YRS 4/> PARAM: CPT

## 2021-03-23 NOTE — TELEPHONE ENCOUNTER
Attempted to contact pt for condition update today however no answer. Pt does not have HFU appt scheduled at this time. Pt is currently in TCM therefore a Non-TCM HFU appt recommended by 3/26/21 as pt is a high risk for readmission. Please advise.       Francesca Kenney

## 2021-03-23 NOTE — PROGRESS NOTES
Attempted to contact pt for condition update however call disconnected. Phone rang 3 times and then sounded as though someone picked up but no response. Call then disconnected. Attempted to call back and same thing occurred.      TE will be sent to [facility]

## 2021-03-24 LAB
CD19 CELLS NFR SPEC: 6 %
CD20 CELLS NFR SPEC: 5 %
CD3 CELLS NFR SPEC: 88 %
CD3+CD4+ CELLS NFR SPEC: 55 %
CD3+CD4+ CELLS/CD3+CD8+ CLL SPEC: 1.8
CD3+CD8+ CELLS NFR SPEC: 31 %
CD45 CELLS NFR SPEC: 100 %
CD5 CELLS NFR SPEC: 68 %
CD5/CD19 CELLS: 2 %
CD56 CELLS NFR SPEC: 4 %
CD7 CELLS NFR SPEC: 42 %
CELL SURF KAPPA/LAMBDA RATIO: 2
CELL SURF LAMBDA LIGHT CHAIN: 3 %
CELL SURFACE KAPPA LIGHT CHAIN: 6 %

## 2021-03-25 LAB — NON GYNE INTERPRETATION: NEGATIVE

## 2021-04-01 ENCOUNTER — TELEPHONE (OUTPATIENT)
Dept: FAMILY MEDICINE CLINIC | Facility: CLINIC | Age: 36
End: 2021-04-01

## 2021-04-01 NOTE — TELEPHONE ENCOUNTER
LM for pt to let her know that there was a referral placed for pulm med with Dr. Ramana Lowe, phone [phone]. Referral authorized and pt should f/u with Dr Lesia Heck.

## 2021-04-08 ENCOUNTER — OFFICE VISIT (OUTPATIENT)
Dept: HEMATOLOGY/ONCOLOGY | Facility: HOSPITAL | Age: 36
End: 2021-04-08
Attending: SPECIALIST
Payer: COMMERCIAL

## 2021-04-08 VITALS
DIASTOLIC BLOOD PRESSURE: 83 MMHG | HEIGHT: 67.01 IN | SYSTOLIC BLOOD PRESSURE: 129 MMHG | WEIGHT: 250 LBS | HEART RATE: 79 BPM | OXYGEN SATURATION: 99 % | TEMPERATURE: 97 F | BODY MASS INDEX: 39.24 KG/M2 | RESPIRATION RATE: 16 BRPM

## 2021-04-08 DIAGNOSIS — Z80.41 FAMILY HISTORY OF OVARIAN CANCER: ICD-10-CM

## 2021-04-08 DIAGNOSIS — I26.99 ACUTE PULMONARY EMBOLISM WITHOUT ACUTE COR PULMONALE, UNSPECIFIED PULMONARY EMBOLISM TYPE (HCC): ICD-10-CM

## 2021-04-08 DIAGNOSIS — Z80.3 FAMILY HISTORY OF BREAST CANCER: ICD-10-CM

## 2021-04-08 DIAGNOSIS — R91.1 INCIDENTAL LUNG NODULE, > 3MM AND < 8MM: Primary | ICD-10-CM

## 2021-04-08 DIAGNOSIS — Z79.01 CURRENT USE OF LONG TERM ANTICOAGULATION: ICD-10-CM

## 2021-04-08 DIAGNOSIS — Z79.3 LONG TERM (CURRENT) USE OF HORMONAL CONTRACEPTIVES: ICD-10-CM

## 2021-04-08 PROCEDURE — 99215 OFFICE O/P EST HI 40 MIN: CPT | Performed by: SPECIALIST

## 2021-04-08 RX ORDER — TOPIRAMATE 25 MG/1
25 TABLET ORAL 2 TIMES DAILY
COMMUNITY
End: 2021-04-19

## 2021-04-08 NOTE — PROGRESS NOTES
[facility] Hematology Oncology Group Progress Note      Patient Name: Ash Healy   YOB: 1985   Medical Record Number: [id number]      Date of Visit: 4/8/2021     Chief Complaint  Pulmonary embolism - follow up.     History of Salima Meghana Monroy MD at [facility] ENDOSCOPY   • CHOLECYSTECTOMY     • ESOPHAGOGASTRODUODENOSCOPY, POSSIBLE BIOPSY, POSSIBLE POLYPECTOMY 84133 N/A 8/13/2018    Performed by Dea Nolasco MD at [address] [place] [address],[address] N/A 2/11/2019    Perfor Medications  topiramate 25 MG Oral Tab, Take 25 mg by mouth 2 (two) times daily. For migraine headaches, Disp: , Rfl:   apixaban 5 MG Oral Tab, Take 1 tablet (5 mg total) by mouth 2 (two) times daily. , Disp: 30 tablet, Rfl: 11  Norethin Ace-Eth Estrad-FE 1 Resp 16   Ht 1.702 m (5' 7.01\")   Wt 113.4 kg (250 lb)   LMP 08/27/2020   SpO2 99%   BMI 39.15 kg/m²     Physical Examination  Constitutional      Well developed, well nourished. Appears close to chronological age. No apparent distress.    Head Normocephal continuing indefinite anticoagulation. Patient is comfortable with this plan as she is particularly concerned about her risk of recurrent thromboembolism. We discussed whether reduction in dose of her OCP would be helpful.  Ultimately it is unknow

## 2021-04-08 NOTE — PROGRESS NOTES
Patient is here today for [facility] follow up  with Cynthia Turner for Pulmonary Embolism. Currently taking Eliquis 5mg twice daily. Patient denies pain. Medication list and medical history were reviewed and updated.      Education Record    Learner:  MARGARITA

## 2021-04-28 ENCOUNTER — PATIENT MESSAGE (OUTPATIENT)
Dept: FAMILY MEDICINE CLINIC | Facility: CLINIC | Age: 36
End: 2021-04-28

## 2021-04-28 DIAGNOSIS — F41.9 ANXIETY AND DEPRESSION: ICD-10-CM

## 2021-04-28 DIAGNOSIS — F32.A ANXIETY AND DEPRESSION: ICD-10-CM

## 2021-04-28 RX ORDER — FLUOXETINE HYDROCHLORIDE 40 MG/1
40 CAPSULE ORAL DAILY
Qty: 30 CAPSULE | Refills: 0 | Status: SHIPPED | OUTPATIENT
Start: 2021-04-28 | End: 2021-06-24

## 2021-04-28 RX ORDER — HYDROXYZINE HYDROCHLORIDE 25 MG/1
25 TABLET, FILM COATED ORAL 2 TIMES DAILY PRN
Qty: 60 TABLET | Refills: 0 | Status: SHIPPED | OUTPATIENT
Start: 2021-04-28 | End: 2021-06-01 | Stop reason: ALTCHOICE

## 2021-04-28 RX ORDER — FLUOXETINE HYDROCHLORIDE 40 MG/1
40 CAPSULE ORAL DAILY
Qty: 30 CAPSULE | Refills: 0 | Status: CANCELLED | OUTPATIENT
Start: 2021-04-28

## 2021-04-28 NOTE — TELEPHONE ENCOUNTER
Approve/deny?  Last filled 3/17/21, pt has appt at [facility] June 1

## 2021-04-28 NOTE — TELEPHONE ENCOUNTER
From: Ash Healy  To: Anisa Reyez DO  Sent: 4/28/2021 1:54 PM CDT  Subject: Prescription Question    My appointment with Sterling Weir isn't until June 1st. Can you please refill my anti anxiety and my Prozac?  I already submitted a refill to

## 2021-06-01 ENCOUNTER — LAB ENCOUNTER (OUTPATIENT)
Dept: LAB | Facility: HOSPITAL | Age: 36
End: 2021-06-01
Attending: ALLERGY & IMMUNOLOGY
Payer: COMMERCIAL

## 2021-06-01 DIAGNOSIS — R09.02 HYPOXEMIA: Primary | ICD-10-CM

## 2021-06-01 DIAGNOSIS — J33.8 POLYP OF NASAL SINUS: ICD-10-CM

## 2021-06-01 DIAGNOSIS — E66.9 OBESITY (BMI 30-39.9): ICD-10-CM

## 2021-06-01 DIAGNOSIS — Z51.81 ENCOUNTER FOR THERAPEUTIC DRUG MONITORING: ICD-10-CM

## 2021-06-01 PROCEDURE — 86003 ALLG SPEC IGE CRUDE XTRC EA: CPT

## 2021-06-01 PROCEDURE — 86038 ANTINUCLEAR ANTIBODIES: CPT

## 2021-06-01 PROCEDURE — 84439 ASSAY OF FREE THYROXINE: CPT

## 2021-06-01 PROCEDURE — 36415 COLL VENOUS BLD VENIPUNCTURE: CPT

## 2021-06-01 PROCEDURE — 80048 BASIC METABOLIC PNL TOTAL CA: CPT

## 2021-06-01 PROCEDURE — 82785 ASSAY OF IGE: CPT

## 2021-06-01 PROCEDURE — 85025 COMPLETE CBC W/AUTO DIFF WBC: CPT

## 2021-06-01 PROCEDURE — 84443 ASSAY THYROID STIM HORMONE: CPT

## 2021-06-01 PROCEDURE — 83520 IMMUNOASSAY QUANT NOS NONAB: CPT

## 2021-06-01 PROCEDURE — 84436 ASSAY OF TOTAL THYROXINE: CPT

## 2021-06-01 PROCEDURE — 83036 HEMOGLOBIN GLYCOSYLATED A1C: CPT

## 2021-06-01 PROCEDURE — 84481 FREE ASSAY (FT-3): CPT

## 2021-06-10 ENCOUNTER — PATIENT MESSAGE (OUTPATIENT)
Dept: FAMILY MEDICINE CLINIC | Facility: CLINIC | Age: 36
End: 2021-06-10

## 2021-06-10 DIAGNOSIS — J45.31 MILD PERSISTENT ASTHMA WITH EXACERBATION: Primary | ICD-10-CM

## 2021-06-10 DIAGNOSIS — R06.02 SOB (SHORTNESS OF BREATH): ICD-10-CM

## 2021-06-10 DIAGNOSIS — J45.901 EXACERBATION OF ASTHMA, UNSPECIFIED ASTHMA SEVERITY, UNSPECIFIED WHETHER PERSISTENT: ICD-10-CM

## 2021-06-10 NOTE — TELEPHONE ENCOUNTER
From: Manon Simmonds  To: Faheem Kemp DO  Sent: 6/10/2021 4:11 PM CDT  Subject: Other    I need another referral for Dr. Jasen Aguayo at [facility]   Phone - [phone]  Fax - [phone]    Address is [address] [place], [place] [address]ing drive.  [address] [facility]

## 2021-06-17 ENCOUNTER — HOSPITAL ENCOUNTER (OUTPATIENT)
Dept: CT IMAGING | Facility: HOSPITAL | Age: 36
Discharge: HOME | End: 2021-06-17
Attending: ALLERGY & IMMUNOLOGY
Payer: COMMERCIAL

## 2021-06-17 DIAGNOSIS — J33.9 NASAL POLYP, UNSPECIFIED: ICD-10-CM

## 2021-06-17 DIAGNOSIS — J33.9 SINUSITIS WITH NASAL POLYPS: ICD-10-CM

## 2021-06-17 DIAGNOSIS — J32.9 SINUSITIS WITH NASAL POLYPS: ICD-10-CM

## 2021-06-17 PROCEDURE — 70486 CT MAXILLOFACIAL W/O DYE: CPT | Performed by: ALLERGY & IMMUNOLOGY

## 2021-08-04 RX ORDER — ALBUTEROL SULFATE 2.5 MG/3ML
2.5 SOLUTION RESPIRATORY (INHALATION) EVERY 4 HOURS PRN
Qty: 15 EACH | Refills: 2 | Status: SHIPPED | OUTPATIENT
Start: 2021-08-04 | End: 2021-12-20

## 2021-08-04 RX ORDER — ALBUTEROL SULFATE 90 UG/1
2 AEROSOL, METERED RESPIRATORY (INHALATION) EVERY 6 HOURS PRN
Qty: 3 EACH | Refills: 0 | Status: SHIPPED | OUTPATIENT
Start: 2021-08-04 | End: 2021-12-20

## 2021-08-23 ENCOUNTER — LAB REQUISITION (OUTPATIENT)
Dept: LAB | Facility: HOSPITAL | Age: 36
End: 2021-08-23
Payer: COMMERCIAL

## 2021-08-23 DIAGNOSIS — D48.5 NEOPLASM OF UNCERTAIN BEHAVIOR OF SKIN: ICD-10-CM

## 2021-08-23 PROCEDURE — 88305 TISSUE EXAM BY PATHOLOGIST: CPT | Performed by: DERMATOLOGY

## 2021-08-29 ENCOUNTER — HOSPITAL ENCOUNTER (INPATIENT)
Facility: HOSPITAL | Age: 36
LOS: 3 days | Discharge: HOME | DRG: 918 | End: 2021-09-01
Attending: EMERGENCY MEDICINE | Admitting: INTERNAL MEDICINE
Payer: COMMERCIAL

## 2021-08-29 ENCOUNTER — APPOINTMENT (OUTPATIENT)
Dept: GENERAL RADIOLOGY | Facility: HOSPITAL | Age: 36
DRG: 918 | End: 2021-08-29
Payer: COMMERCIAL

## 2021-08-29 DIAGNOSIS — T39.391A OVERDOSE OF NONSTEROIDAL ANTI-INFLAMMATORY DRUG (NSAID), ACCIDENTAL OR UNINTENTIONAL, INITIAL ENCOUNTER: Primary | ICD-10-CM

## 2021-08-29 DIAGNOSIS — N17.9 AKI (ACUTE KIDNEY INJURY) (HCC): ICD-10-CM

## 2021-08-29 PROBLEM — Z86.711 HX OF PULMONARY EMBOLUS: Status: ACTIVE | Noted: 2021-08-29

## 2021-08-29 PROBLEM — IMO0002 METABOLIC ACIDOSIS DUE TO INGESTION OF DRUGS OR CHEMICALS: Status: ACTIVE | Noted: 2021-03-17

## 2021-08-29 PROBLEM — E87.2 METABOLIC ACIDOSIS DUE TO INGESTION OF DRUGS OR CHEMICALS: Status: ACTIVE | Noted: 2021-03-17

## 2021-08-29 LAB
ALBUMIN SERPL-MCNC: 4 G/DL (ref 3.4–5)
ALP LIVER SERPL-CCNC: 77 U/L
ALT SERPL-CCNC: 13 U/L
ANION GAP SERPL CALC-SCNC: 12 MMOL/L (ref 0–18)
APAP SERPL-MCNC: <2 UG/ML (ref 10–30)
APTT PPP: 38.7 SECONDS (ref 23.2–35.3)
AST SERPL-CCNC: 11 U/L (ref 15–37)
BASOPHILS # BLD AUTO: 0.02 X10(3) UL (ref 0–0.2)
BASOPHILS NFR BLD AUTO: 0.3 %
BILIRUB DIRECT SERPL-MCNC: 0.1 MG/DL (ref 0–0.2)
BILIRUB SERPL-MCNC: 1.5 MG/DL (ref 0.1–2)
BILIRUB UR QL: NEGATIVE
BUN BLD-MCNC: 30 MG/DL (ref 7–18)
BUN/CREAT SERPL: 7.1 (ref 10–20)
CA-I BLD-SCNC: 1.15 MMOL/L (ref 0.95–1.32)
CA-I BLD-SCNC: 1.18 MMOL/L (ref 0.95–1.32)
CALCIUM BLD-MCNC: 10.1 MG/DL (ref 8.5–10.1)
CHLORIDE SERPL-SCNC: 109 MMOL/L (ref 98–112)
CO2 SERPL-SCNC: 16 MMOL/L (ref 21–32)
COHGB MFR BLD: 1.6 % (ref 0–1.5)
COHGB MFR BLD: 2.1 % (ref 0–1.5)
COLOR UR: YELLOW
CREAT BLD-MCNC: 4.25 MG/DL
CREAT UR-SCNC: 103 MG/DL
DEPRECATED RDW RBC AUTO: 41.9 FL (ref 35.1–46.3)
EOSINOPHIL # BLD AUTO: 0.06 X10(3) UL (ref 0–0.7)
EOSINOPHIL NFR BLD AUTO: 0.8 %
ERYTHROCYTE [DISTWIDTH] IN BLOOD BY AUTOMATED COUNT: 13.8 % (ref 11–15)
GLUCOSE BLD-MCNC: 97 MG/DL (ref 70–99)
GLUCOSE BLDC GLUCOMTR-MCNC: 89 MG/DL (ref 70–99)
GLUCOSE UR-MCNC: NEGATIVE MG/DL
HCT VFR BLD AUTO: 34.7 %
HGB BLD-MCNC: 10.4 G/DL (ref 12–16)
HGB BLD-MCNC: 11.6 G/DL
HGB BLD-MCNC: 13.6 G/DL (ref 12–16)
IMM GRANULOCYTES # BLD AUTO: 0.02 X10(3) UL (ref 0–1)
IMM GRANULOCYTES NFR BLD: 0.3 %
INR BLD: 1.95 (ref 0.9–1.2)
LACTATE BLD-SCNC: 1.2 MMOL/L (ref 0.5–2.2)
LACTATE BLD-SCNC: 2.4 MMOL/L (ref 0.5–2.2)
LYMPHOCYTES # BLD AUTO: 1.03 X10(3) UL (ref 1–4)
LYMPHOCYTES NFR BLD AUTO: 14.6 %
M PROTEIN MFR SERPL ELPH: 7.9 G/DL (ref 6.4–8.2)
MCH RBC QN AUTO: 27.8 PG (ref 26–34)
MCHC RBC AUTO-ENTMCNC: 33.4 G/DL (ref 31–37)
MCV RBC AUTO: 83.2 FL
METHGB MFR BLD: 1.1 % SAT (ref 0.4–1.5)
METHGB MFR BLD: 1.2 % SAT (ref 0.4–1.5)
MODIFIED ALLEN TEST: POSITIVE
MONOCYTES # BLD AUTO: 0.34 X10(3) UL (ref 0.1–1)
MONOCYTES NFR BLD AUTO: 4.8 %
NEUTROPHILS # BLD AUTO: 5.59 X10 (3) UL (ref 1.5–7.7)
NEUTROPHILS # BLD AUTO: 5.59 X10(3) UL (ref 1.5–7.7)
NEUTROPHILS NFR BLD AUTO: 79.2 %
NITRITE UR QL STRIP.AUTO: NEGATIVE
NT-PROBNP SERPL-MCNC: 323 PG/ML (ref ?–125)
O2 CT BLD-SCNC: 14.3 VOL% (ref 15–23)
O2 CT BLD-SCNC: 18.8 VOL% (ref 15–23)
O2/TOTAL GAS SETTING VFR VENT: 21 %
OSMOLALITY SERPL CALC.SUM OF ELEC: 290 MOSM/KG (ref 275–295)
PCO2 BLDA: 21 MM HG (ref 35–45)
PCO2 BLDA: 21 MM HG (ref 35–45)
PH BLDA: 7.6 [PH] (ref 7.35–7.45)
PH BLDA: 7.69 [PH] (ref 7.35–7.45)
PH UR: 5 [PH] (ref 5–8)
PHOSPHATE SERPL-MCNC: 1.8 MG/DL (ref 2.5–4.9)
PLATELET # BLD AUTO: 308 10(3)UL (ref 150–450)
PO2 BLDA: 114 MM HG (ref 80–100)
PO2 BLDA: 153 MM HG (ref 80–100)
POTASSIUM BLD-SCNC: 4.1 MMOL/L (ref 3.3–5.1)
POTASSIUM BLD-SCNC: 4.5 MMOL/L (ref 3.3–5.1)
POTASSIUM SERPL-SCNC: 4.2 MMOL/L (ref 3.5–5.1)
PROT UR-MCNC: 30 MG/DL
PROTHROMBIN TIME: 22 SECONDS (ref 11.8–14.5)
PUNCTURE CHARGE: YES
PUNCTURE CHARGE: YES
RBC # BLD AUTO: 4.17 X10(6)UL
RBC #/AREA URNS AUTO: >10 /HPF
SALICYLATES SERPL-MCNC: <1.7 MG/DL (ref 2.8–20)
SAO2 % BLDA: >99 % (ref 94–100)
SAO2 % BLDA: >99 % (ref 94–100)
SARS-COV-2 RNA RESP QL NAA+PROBE: NOT DETECTED
SODIUM BLD-SCNC: 136 MMOL/L (ref 135–145)
SODIUM BLD-SCNC: 137 MMOL/L (ref 135–145)
SODIUM SERPL-SCNC: 120 MMOL/L
SODIUM SERPL-SCNC: 137 MMOL/L (ref 136–145)
SP GR UR STRIP: 1.01 (ref 1–1.03)
UROBILINOGEN UR STRIP-ACNC: <2
WBC # BLD AUTO: 7.1 X10(3) UL (ref 4–11)

## 2021-08-29 PROCEDURE — 99223 1ST HOSP IP/OBS HIGH 75: CPT | Performed by: INTERNAL MEDICINE

## 2021-08-29 PROCEDURE — 99255 IP/OBS CONSLTJ NEW/EST HI 80: CPT | Performed by: INTERNAL MEDICINE

## 2021-08-29 PROCEDURE — 71045 X-RAY EXAM CHEST 1 VIEW: CPT | Performed by: EMERGENCY MEDICINE

## 2021-08-29 RX ORDER — NITROGLYCERIN 0.4 MG/1
0.4 TABLET SUBLINGUAL EVERY 5 MIN PRN
Status: DISCONTINUED | OUTPATIENT
Start: 2021-08-29 | End: 2021-09-01

## 2021-08-29 RX ORDER — FLUOXETINE HYDROCHLORIDE 20 MG/1
60 CAPSULE ORAL DAILY
COMMUNITY
End: 2021-12-15 | Stop reason: DRUGHIGH

## 2021-08-29 RX ORDER — SODIUM CHLORIDE, SODIUM LACTATE, POTASSIUM CHLORIDE, CALCIUM CHLORIDE 600; 310; 30; 20 MG/100ML; MG/100ML; MG/100ML; MG/100ML
INJECTION, SOLUTION INTRAVENOUS CONTINUOUS
Status: DISCONTINUED | OUTPATIENT
Start: 2021-08-29 | End: 2021-08-30

## 2021-08-29 RX ORDER — ACETAMINOPHEN 325 MG/1
650 TABLET ORAL EVERY 4 HOURS PRN
Status: DISCONTINUED | OUTPATIENT
Start: 2021-08-29 | End: 2021-09-01

## 2021-08-29 RX ORDER — MORPHINE SULFATE 4 MG/ML
INJECTION, SOLUTION INTRAMUSCULAR; INTRAVENOUS
Status: COMPLETED
Start: 2021-08-29 | End: 2021-08-29

## 2021-08-29 RX ORDER — ONDANSETRON 2 MG/ML
4 INJECTION INTRAMUSCULAR; INTRAVENOUS EVERY 6 HOURS PRN
Status: DISCONTINUED | OUTPATIENT
Start: 2021-08-29 | End: 2021-09-01

## 2021-08-29 RX ORDER — CHOLECALCIFEROL (VITAMIN D3) 50 MCG
1 CAPSULE ORAL
COMMUNITY
End: 2021-09-27 | Stop reason: ALTCHOICE

## 2021-08-29 RX ORDER — MORPHINE SULFATE 2 MG/ML
2 INJECTION, SOLUTION INTRAMUSCULAR; INTRAVENOUS EVERY 2 HOUR PRN
Status: DISCONTINUED | OUTPATIENT
Start: 2021-08-29 | End: 2021-09-01

## 2021-08-29 RX ORDER — MORPHINE SULFATE 4 MG/ML
4 INJECTION, SOLUTION INTRAMUSCULAR; INTRAVENOUS EVERY 2 HOUR PRN
Status: DISCONTINUED | OUTPATIENT
Start: 2021-08-29 | End: 2021-09-01

## 2021-08-29 RX ORDER — HYDROCODONE BITARTRATE AND ACETAMINOPHEN 5; 325 MG/1; MG/1
2 TABLET ORAL EVERY 4 HOURS PRN
Status: DISCONTINUED | OUTPATIENT
Start: 2021-08-29 | End: 2021-09-01

## 2021-08-29 RX ORDER — AMOXICILLIN 250 MG
1 CAPSULE ORAL 2 TIMES DAILY
COMMUNITY
End: 2021-09-27 | Stop reason: ALTCHOICE

## 2021-08-29 RX ORDER — HYDROCODONE BITARTRATE AND ACETAMINOPHEN 5; 325 MG/1; MG/1
1 TABLET ORAL EVERY 4 HOURS PRN
Status: DISCONTINUED | OUTPATIENT
Start: 2021-08-29 | End: 2021-09-01

## 2021-08-29 RX ORDER — CLONAZEPAM 0.25 MG/1
0.25 TABLET, ORALLY DISINTEGRATING ORAL 2 TIMES DAILY PRN
Status: DISCONTINUED | OUTPATIENT
Start: 2021-08-29 | End: 2021-09-01

## 2021-08-29 RX ORDER — ALBUTEROL SULFATE 90 UG/1
2 AEROSOL, METERED RESPIRATORY (INHALATION) EVERY 6 HOURS PRN
Status: DISCONTINUED | OUTPATIENT
Start: 2021-08-29 | End: 2021-09-01

## 2021-08-29 RX ORDER — MORPHINE SULFATE 4 MG/ML
4 INJECTION, SOLUTION INTRAMUSCULAR; INTRAVENOUS ONCE
Status: COMPLETED | OUTPATIENT
Start: 2021-08-29 | End: 2021-08-29

## 2021-08-29 RX ORDER — MORPHINE SULFATE 2 MG/ML
1 INJECTION, SOLUTION INTRAMUSCULAR; INTRAVENOUS EVERY 2 HOUR PRN
Status: DISCONTINUED | OUTPATIENT
Start: 2021-08-29 | End: 2021-09-01

## 2021-08-29 RX ORDER — DEXTROSE MONOHYDRATE 25 G/50ML
50 INJECTION, SOLUTION INTRAVENOUS
Status: DISCONTINUED | OUTPATIENT
Start: 2021-08-29 | End: 2021-09-01

## 2021-08-29 RX ORDER — LIDOCAINE HYDROCHLORIDE 10 MG/ML
INJECTION, SOLUTION EPIDURAL; INFILTRATION; INTRACAUDAL; PERINEURAL
Status: COMPLETED
Start: 2021-08-29 | End: 2021-08-29

## 2021-08-29 RX ORDER — LORAZEPAM 2 MG/ML
1 INJECTION INTRAMUSCULAR ONCE
Status: COMPLETED | OUTPATIENT
Start: 2021-08-29 | End: 2021-08-29

## 2021-08-29 RX ORDER — TOPIRAMATE 25 MG/1
25 TABLET ORAL NIGHTLY
Status: DISCONTINUED | OUTPATIENT
Start: 2021-08-29 | End: 2021-08-30

## 2021-08-29 RX ORDER — POLYETHYLENE GLYCOL 3350 17 G/17G
17 POWDER, FOR SOLUTION ORAL DAILY
COMMUNITY

## 2021-08-29 NOTE — ED QUICK NOTES
Poison control  - case #[id number]    Recommendations include fluids/hydration, monitor electrolytes, and monitor for signs of GI bleeding.     Case to be reviewed by .

## 2021-08-29 NOTE — ED PROVIDER NOTES
Patient Seen in: [facility] Emergency Department      History   Patient presents with:  Difficulty Breathing    Stated Complaint:     HPI/Subjective:   HPI    58-year-old female with past medical history significant for asthma, chronic back pain, Review of Systems    Positive for stated complaint:   Other systems are as noted in HPI. Constitutional and vital signs reviewed. All other systems reviewed and negative except as noted above.     Physical Exam     ED Triage Vitals [08/29/21 141 pH 7.69 (*)     ABG pCO2 21 (*)     ABG PO2 153 (*)     Lactic Acid (Blood Gas) 2.4 (*)     Carboxyhemoglobin 1.6 (*)     All other components within normal limits   URINALYSIS WITH CULTURE REFLEX - Abnormal; Notable for the following components:    Clarit -----------         ------                     CBC W/ DIFFERENTIAL[[id number]]          Abnormal            Final result                 Please view results for these tests on the individual orders.    SODIUM, URINE, RANDOM   CREATININE, URINE, RA out  Sterile prep and precautions of the right and left groin  Anesthesia with 5 mL lidocaine 1%  Made 2 attempts at placing abscess catheter in the right groin with good drawback of blood both times however favor the wire would not pass.   Prepped the left

## 2021-08-29 NOTE — CONSULTS
[facility]    Report of Consultation  Visit conducted via audio and video telecommunication     Date of Admission:  8/29/2021  Date of Consult:  8/29/2021   Reason for Consultation:     RAVI     History of Present Illness:   Patient is a 3 Bladder CA   • Other (Other) Maternal Grandfather         parkinson    • Cancer Paternal Grandmother         Breast CA   • Cancer Maternal Aunt         Breast CA   • Alcohol and Other Disorders Associated Maternal Uncle        Social History  Patient Axel Cubampf (Calculated - sq m): 2.13 sq meters (08/29 1411)  Pulse: 72 (08/29 1645)  BP: 130/75 (08/29 1645)  Temp: 97 °F (36.1 °C) (08/29 1411)  Do Not Use - Resp Rate: --  SpO2: 99 % (08/29 1645)  Temp:  [97 °F (36.1 °C)] 97 °F (36.1 °C)  Pulse:  [] 72  Resp: (not the Protime value) should be utilized for   the monitoring of oral anticoagulant therapy. Recommended therapeutic ranges for anticoagulant therapy are   as follows:   2.0 - 3.0 All indications except for mechanical prosthetic   cardiac valves.

## 2021-08-29 NOTE — ED QUICK NOTES
London Gonzalez, cell #[phone], toxicology fellow to call back if there are any further recommendations from [facility] regarding patient's case.

## 2021-08-29 NOTE — H&P
[address] [facility] Patient Status:  Emergency    10/15/1985  38year old CSN [id number]   Location  Attending Carmenza Boo MD     PCP Clarissa Alva DO         DATE OF ADMISSION:  Hydrocodone-Acetaminophen    MEDICATIONS  Patient's Medications   New Prescriptions    No medications on file   Previous Medications    ALBUTEROL SULFATE (2.5 MG/3ML) 0.083% INHALATION NEBU SOLN    Take 3 mL (2.5 mg total) by nebulization every 4 (four) ho Spouse name: Not on file      Number of children: Not on file      Years of education: Not on file      Highest education level: Not on file    Tobacco Use      Smoking status: Former Smoker        Packs/day: 1.00        Years: 15.00        Pack years: 13 (CPT=71045)    Result Date: 8/29/2021  CONCLUSION:  1. No acute findings.     Dictated by (CST): Sean Gutierrez MD on 8/29/2021 at 3:01 PM     Finalized by (CST): Donald Monique MD on 8/29/2021 at 3:02 PM            Data:  Recent Labs   L discussing with other providers, examining patient, obtaining history, reviewing previous medical records, going over test results/imaging and discussing plan of care. All questions answered to best of my ability.           Jaylen Napoles MD    This not

## 2021-08-29 NOTE — ED INITIAL ASSESSMENT (HPI)
Patient here with c/o worsening shortness of breath x 1 week. Arrived via [employer] from work. Patient has hx of PE's, covid hx, and asthma. Currently on Eliquis, denies any missed doses.

## 2021-08-30 ENCOUNTER — APPOINTMENT (OUTPATIENT)
Dept: ULTRASOUND IMAGING | Facility: HOSPITAL | Age: 36
DRG: 918 | End: 2021-08-30
Attending: INTERNAL MEDICINE
Payer: COMMERCIAL

## 2021-08-30 LAB
ALBUMIN SERPL-MCNC: 3.1 G/DL (ref 3.4–5)
ALBUMIN/GLOB SERPL: 1 {RATIO} (ref 1–2)
ALP LIVER SERPL-CCNC: 58 U/L
ALT SERPL-CCNC: 12 U/L
ANION GAP SERPL CALC-SCNC: 5 MMOL/L (ref 0–18)
AST SERPL-CCNC: 8 U/L (ref 15–37)
BASOPHILS # BLD AUTO: 0.02 X10(3) UL (ref 0–0.2)
BASOPHILS NFR BLD AUTO: 0.4 %
BILIRUB SERPL-MCNC: 0.8 MG/DL (ref 0.1–2)
BILIRUB UR QL: NEGATIVE
BUN BLD-MCNC: 28 MG/DL (ref 7–18)
BUN/CREAT SERPL: 8.8 (ref 10–20)
CALCIUM BLD-MCNC: 8.1 MG/DL (ref 8.5–10.1)
CHLORIDE SERPL-SCNC: 111 MMOL/L (ref 98–112)
CHOLEST SMN-MCNC: 149 MG/DL (ref ?–200)
CLARITY UR: CLEAR
CO2 SERPL-SCNC: 22 MMOL/L (ref 21–32)
COLOR UR: YELLOW
CREAT BLD-MCNC: 3.19 MG/DL
DEPRECATED HBV CORE AB SER IA-ACNC: 46.3 NG/ML
DEPRECATED RDW RBC AUTO: 45.3 FL (ref 35.1–46.3)
EOSINOPHIL # BLD AUTO: 0.18 X10(3) UL (ref 0–0.7)
EOSINOPHIL NFR BLD AUTO: 3.9 %
ERYTHROCYTE [DISTWIDTH] IN BLOOD BY AUTOMATED COUNT: 14 % (ref 11–15)
GLOBULIN PLAS-MCNC: 3.2 G/DL (ref 2.8–4.4)
GLUCOSE BLD-MCNC: 83 MG/DL (ref 70–99)
GLUCOSE BLDC GLUCOMTR-MCNC: 75 MG/DL (ref 70–99)
GLUCOSE BLDC GLUCOMTR-MCNC: 81 MG/DL (ref 70–99)
GLUCOSE BLDC GLUCOMTR-MCNC: 93 MG/DL (ref 70–99)
GLUCOSE BLDC GLUCOMTR-MCNC: 94 MG/DL (ref 70–99)
GLUCOSE UR-MCNC: NEGATIVE MG/DL
HAV IGM SER QL: 2.2 MG/DL (ref 1.6–2.6)
HCT VFR BLD AUTO: 29.8 %
HDLC SERPL-MCNC: 36 MG/DL (ref 40–59)
HGB BLD-MCNC: 9.3 G/DL
IMM GRANULOCYTES # BLD AUTO: 0.01 X10(3) UL (ref 0–1)
IMM GRANULOCYTES NFR BLD: 0.2 %
IRON SATURATION: 16 %
IRON SERPL-MCNC: 55 UG/DL
LDLC SERPL CALC-MCNC: 77 MG/DL (ref ?–100)
LEUKOCYTE ESTERASE UR QL STRIP.AUTO: NEGATIVE
LYMPHOCYTES # BLD AUTO: 1.33 X10(3) UL (ref 1–4)
LYMPHOCYTES NFR BLD AUTO: 28.7 %
M PROTEIN MFR SERPL ELPH: 6.3 G/DL (ref 6.4–8.2)
MCH RBC QN AUTO: 27.9 PG (ref 26–34)
MCHC RBC AUTO-ENTMCNC: 31.2 G/DL (ref 31–37)
MCV RBC AUTO: 89.5 FL
MONOCYTES # BLD AUTO: 0.46 X10(3) UL (ref 0.1–1)
MONOCYTES NFR BLD AUTO: 9.9 %
NEUTROPHILS # BLD AUTO: 2.64 X10 (3) UL (ref 1.5–7.7)
NEUTROPHILS # BLD AUTO: 2.64 X10(3) UL (ref 1.5–7.7)
NEUTROPHILS NFR BLD AUTO: 56.9 %
NITRITE UR QL STRIP.AUTO: NEGATIVE
NONHDLC SERPL-MCNC: 113 MG/DL (ref ?–130)
OSMOLALITY SERPL CALC.SUM OF ELEC: 291 MOSM/KG (ref 275–295)
PH UR: 5 [PH] (ref 5–8)
PLATELET # BLD AUTO: 235 10(3)UL (ref 150–450)
POTASSIUM SERPL-SCNC: 4.2 MMOL/L (ref 3.5–5.1)
PROT UR-MCNC: NEGATIVE MG/DL
RBC # BLD AUTO: 3.33 X10(6)UL
RBC #/AREA URNS AUTO: >10 /HPF
SODIUM SERPL-SCNC: 138 MMOL/L (ref 136–145)
SP GR UR STRIP: 1.01 (ref 1–1.03)
TOTAL IRON BINDING CAPACITY: 344 UG/DL (ref 240–450)
TRANSFERRIN SERPL-MCNC: 231 MG/DL (ref 200–360)
TRIGL SERPL-MCNC: 214 MG/DL (ref 30–149)
UROBILINOGEN UR STRIP-ACNC: <2
VLDLC SERPL CALC-MCNC: 33 MG/DL (ref 0–30)
WBC # BLD AUTO: 4.6 X10(3) UL (ref 4–11)

## 2021-08-30 PROCEDURE — 99233 SBSQ HOSP IP/OBS HIGH 50: CPT | Performed by: INTERNAL MEDICINE

## 2021-08-30 PROCEDURE — 76770 US EXAM ABDO BACK WALL COMP: CPT | Performed by: INTERNAL MEDICINE

## 2021-08-30 RX ORDER — TRAZODONE HYDROCHLORIDE 50 MG/1
50 TABLET ORAL NIGHTLY
Status: DISCONTINUED | OUTPATIENT
Start: 2021-08-30 | End: 2021-09-01

## 2021-08-30 RX ORDER — TOPIRAMATE 25 MG/1
25 TABLET ORAL 2 TIMES DAILY
Status: DISCONTINUED | OUTPATIENT
Start: 2021-08-30 | End: 2021-09-01

## 2021-08-30 RX ORDER — FLUOXETINE HYDROCHLORIDE 20 MG/1
60 CAPSULE ORAL DAILY
Status: DISCONTINUED | OUTPATIENT
Start: 2021-08-30 | End: 2021-09-01

## 2021-08-30 RX ORDER — MULTIPLE VITAMINS W/ MINERALS TAB 9MG-400MCG
1 TAB ORAL DAILY
Status: DISCONTINUED | OUTPATIENT
Start: 2021-08-30 | End: 2021-09-01

## 2021-08-30 RX ORDER — ALBUTEROL SULFATE 2.5 MG/3ML
2.5 SOLUTION RESPIRATORY (INHALATION) EVERY 4 HOURS PRN
Status: DISCONTINUED | OUTPATIENT
Start: 2021-08-30 | End: 2021-09-01

## 2021-08-30 RX ORDER — DEXTROSE AND SODIUM CHLORIDE 5; .9 G/100ML; G/100ML
INJECTION, SOLUTION INTRAVENOUS CONTINUOUS
Status: DISCONTINUED | OUTPATIENT
Start: 2021-08-30 | End: 2021-09-01

## 2021-08-30 NOTE — PROGRESS NOTES
[facility]    Progress Note    Cierra Spence Patient Status:  Inpatient    10/15/1985 MRN [id number]   Location [facility] 2W/SW Attending Samy Dasilva MD   Hosp Day # 1 PCP Delia Dawn DO     CC: Shortness of 27.8 27.9   MCHC 33.4 31.2   RDW 13.8 14.0   NEPRELIM 5.59 2.64   WBC 7.1 4.6   .0 235.0     Recent Labs   Lab 08/29/21  1417 08/30/21  0440   GLU 97 83   BUN 30* 28*   CREATSERUM 4.25* 3.19*   GFRAA 14* 20*   GFRNAA 13* 18*   CA 10.1 8.1*   ALB 4.0 alkalosis.   -Salicylate levels noted  -Poison control contacted by ED, recommend fluid/hydration, monitoring electrolytes and monitoring for signs of GI bleeding.  -Continue IV fluids  -Monitoring urine output       Metabolic acidosis due to ingestion of d

## 2021-08-30 NOTE — RESPIRATORY THERAPY NOTE
BE and HCO3 incalc       Ref.  Range 8/29/2021 19:20   ABG PH Latest Ref Range: 7.35 - 7.45  7.60 (HH)   ABG PCO2 Latest Ref Range: 35 - 45 mm Hg 21 (L)   ABG PO2 Latest Ref Range: 80 - 100 mm Hg 114 (H)   ABG O2 SATURATION Latest Ref Range: 94.0 - 100.0 %

## 2021-08-30 NOTE — PLAN OF CARE
Patient AxOx4. VSS. Complaints of generalized pain, morphine & norco available. Patient nauseated today with poor appetite, giving antiemetics & monitoring for hypoglycemia. RF improving, renal US was normal. Will continue IVF.  Plans to transfer patient to pain  - Anticipate increased pain with activity and pre-medicate as appropriate  Outcome: Progressing     Problem: RISK FOR INFECTION - ADULT  Goal: Absence of fever/infection during anticipated neutropenic period  Description: INTERVENTIONS  - Monitor WBC Patient/Family is able to understand and participate in their care  Description: Interventions:  - Assess communication ability and preferred communication style  - Implement communication aides and strategies  - Use visual cues when possible  - Listen att

## 2021-08-30 NOTE — PLAN OF CARE
Patient received alert and oriented, lungs clear bilaterally. No complaints of sob this shift, respirations are even with no noted exceptional work of breathing. Patient very particular about her home meds, md paged throughout shift.     Prn medication strengthening/mobility  - Encourage toileting schedule  Outcome: Progressing

## 2021-08-30 NOTE — PROGRESS NOTES
[facility]    Progress Note    Visit conducted via Audio and video Telecommunication     Subjective:     State doesn't feel good. Still has nausea, 6/10 pain, pain with deep breathing. No diarrhea or urinary complaints.     Review of Syste 0.25 mg, 0.25 mg, Oral, BID PRN  Fluticasone Furoate-Vilanterol (BREO ELLIPTA) 200-25 MCG/INH inhaler 1 puff, 1 puff, Inhalation, Daily  glucose (DEX4) oral liquid 15 g, 15 g, Oral, Q15 Min PRN   Or  glucose-vitamin C (DEX-4) chewable tab 4 tablet, 4 table 08/29/2021 38.7 (H) 23.2 - 35.3 seconds Final     Comment:     Elevations of the aPTT in patients not receiving  anticoagulant therapy (Heparin, etc.), may be  seen in Factor deficiency, vitamin K deficiency,  factor inhibitors, liver disease, etc.  Clin 8/29/2021  ECG Report  Interpretation  -------------------------- Sinus Rhythm -RSR(V1). PROBABLY NORMAL No previous ECGs available Electronically signed on 08/29/2021 at 18:18 by Jesus Lopez M.D. Assessment and Plan:       1.  RAVI: non oliguric   -

## 2021-08-31 ENCOUNTER — APPOINTMENT (OUTPATIENT)
Dept: CV DIAGNOSTICS | Facility: HOSPITAL | Age: 36
DRG: 918 | End: 2021-08-31
Attending: INTERNAL MEDICINE
Payer: COMMERCIAL

## 2021-08-31 LAB
ANION GAP SERPL CALC-SCNC: 7 MMOL/L (ref 0–18)
BASOPHILS # BLD AUTO: 0.02 X10(3) UL (ref 0–0.2)
BASOPHILS NFR BLD AUTO: 0.5 %
BUN BLD-MCNC: 16 MG/DL (ref 7–18)
BUN/CREAT SERPL: 10.1 (ref 10–20)
CALCIUM BLD-MCNC: 7.7 MG/DL (ref 8.5–10.1)
CHLORIDE SERPL-SCNC: 111 MMOL/L (ref 98–112)
CO2 SERPL-SCNC: 21 MMOL/L (ref 21–32)
CREAT BLD-MCNC: 1.59 MG/DL
D DIMER PPP FEU-MCNC: <0.27 UG/ML FEU (ref ?–0.5)
DEPRECATED RDW RBC AUTO: 43.9 FL (ref 35.1–46.3)
EOSINOPHIL # BLD AUTO: 0.21 X10(3) UL (ref 0–0.7)
EOSINOPHIL NFR BLD AUTO: 5.5 %
ERYTHROCYTE [DISTWIDTH] IN BLOOD BY AUTOMATED COUNT: 13.7 % (ref 11–15)
GLUCOSE BLD-MCNC: 105 MG/DL (ref 70–99)
GLUCOSE BLDC GLUCOMTR-MCNC: 104 MG/DL (ref 70–99)
GLUCOSE BLDC GLUCOMTR-MCNC: 113 MG/DL (ref 70–99)
GLUCOSE BLDC GLUCOMTR-MCNC: 95 MG/DL (ref 70–99)
GLUCOSE BLDC GLUCOMTR-MCNC: 98 MG/DL (ref 70–99)
HCT VFR BLD AUTO: 27.9 %
HGB BLD-MCNC: 9 G/DL
IMM GRANULOCYTES # BLD AUTO: 0 X10(3) UL (ref 0–1)
IMM GRANULOCYTES NFR BLD: 0 %
LYMPHOCYTES # BLD AUTO: 1.29 X10(3) UL (ref 1–4)
LYMPHOCYTES NFR BLD AUTO: 33.6 %
MCH RBC QN AUTO: 28.2 PG (ref 26–34)
MCHC RBC AUTO-ENTMCNC: 32.3 G/DL (ref 31–37)
MCV RBC AUTO: 87.5 FL
MONOCYTES # BLD AUTO: 0.34 X10(3) UL (ref 0.1–1)
MONOCYTES NFR BLD AUTO: 8.9 %
NEUTROPHILS # BLD AUTO: 1.98 X10 (3) UL (ref 1.5–7.7)
NEUTROPHILS # BLD AUTO: 1.98 X10(3) UL (ref 1.5–7.7)
NEUTROPHILS NFR BLD AUTO: 51.5 %
OSMOLALITY SERPL CALC.SUM OF ELEC: 290 MOSM/KG (ref 275–295)
PHOSPHATE SERPL-MCNC: 2.9 MG/DL (ref 2.5–4.9)
PLATELET # BLD AUTO: 204 10(3)UL (ref 150–450)
POTASSIUM SERPL-SCNC: 3.9 MMOL/L (ref 3.5–5.1)
RBC # BLD AUTO: 3.19 X10(6)UL
SODIUM SERPL-SCNC: 139 MMOL/L (ref 136–145)
WBC # BLD AUTO: 3.8 X10(3) UL (ref 4–11)

## 2021-08-31 PROCEDURE — 93306 TTE W/DOPPLER COMPLETE: CPT | Performed by: INTERNAL MEDICINE

## 2021-08-31 PROCEDURE — 99233 SBSQ HOSP IP/OBS HIGH 50: CPT | Performed by: INTERNAL MEDICINE

## 2021-08-31 PROCEDURE — 99222 1ST HOSP IP/OBS MODERATE 55: CPT | Performed by: INTERNAL MEDICINE

## 2021-08-31 RX ORDER — CETIRIZINE HYDROCHLORIDE 10 MG/1
10 TABLET ORAL DAILY
Status: DISCONTINUED | OUTPATIENT
Start: 2021-08-31 | End: 2021-09-01

## 2021-08-31 NOTE — PROGRESS NOTES
[place] [facility]    [place] Note    Louise Vivar Patient Status:  Inpatient    10/15/1985 MRN [id number]   Location [facility] 2W/SW Attending Ijeoma Estrada MD   [facility] Day # 2 PCP Michelle Ramos DO     CC: Shortness of focal deficit.  SKIN:  Warm and well perfused  PSYCHIATRIC: Anxious mood    Results:   Recent Labs   Lab 08/29/21  1417 08/30/21  0440 08/31/21  0537   RBC 4.17 3.33* 3.19*   HGB 11.6* 9.3* 9.0*   HCT 34.7* 29.8* 27.9*   MCV 83.2 89.5 87.5   MCH 27.8 27.9 2 Rhythm -RSR(V1). PROBABLY NORMAL No previous ECGs available Electronically signed on 08/29/2021 at 18:18 by Kendra Mayo M.D.       Assessment & Plan:     NSAID overdose  -Patient reports taking around 15 to 20 tablets of naproxen per day for unknown amou spent, >50% spent coordinating care with providers and counseling re: treatment plan and workup    Jayson Tamez MD            This note was prepared using [employer] voice recognition dictation software. As a result errors may occur.  When identif

## 2021-08-31 NOTE — PLAN OF CARE
Patient transferred to room 570, blood sugar taken prior to tx and wnl, report given to miguelina. Remote tele initiated. Skin assessed with 2nd nurse. Small biopsy spot to mid back healing. Patient is alert and oriented and able to make needs known.  Transport

## 2021-08-31 NOTE — CONSULTS
[facility]    Report of Consultation    Karli Browns Patient Status:  Inpatient    10/15/1985 MRN [id number]   Location [facility] 5SW/SE Attending Shane Najera, [address] [facility] Day # 2 PCP Faheem Kemp, DO     Date o CA   • Cancer Maternal Aunt         Breast CA   • Alcohol and Other Disorders Associated Maternal Uncle        Social History  Social History    Socioeconomic History      Marital status: Single      Spouse name: Not on file      Number of children: Not on Sulfate 0.1 MG/ML injection 0.5 mg, 0.5 mg, Intravenous, PRN  nitroGLYCERIN (NITROSTAT) SL tab 0.4 mg, 0.4 mg, Sublingual, Q5 Min PRN  acetaminophen (TYLENOL) tab 650 mg, 650 mg, Oral, Q4H PRN   Or  HYDROcodone-acetaminophen (NORCO) 5-325 MG per tab 1 tabl mg total) by mouth in the morning, at noon, and at bedtime. ubrogepant (UBRELVY) 50 MG Oral Tab, Take 50 mg by mouth daily as needed. May take one more tab after 2 hours. Max 2 tabs per day.   apixaban 5 MG Oral Tab, Take 1 tablet (5 mg total) by mouth 2 ( anxiety  Hematologic: denies bruising        Physical Exam:   Blood pressure 112/56, pulse 53, temperature 98.2 °F (36.8 °C), temperature source Oral, resp.  rate 16, height 5' 7\" (1.702 m), weight 233 lb (105.7 kg), last menstrual period 08/16/2021, SpO2 of dyspnea. Evidence of NSAID overdose on presentation taking 15 to 20 tablets of naproxen. Evidence of significant alkalosis on presentation. Alkalosis improved. -Admits to ongoing dyspnea with some pleuritic discomfort.   Hospitalized in March 2021 wi

## 2021-08-31 NOTE — PROGRESS NOTES
[facility]    Progress Note    Visit conducted via Audio and video Telecommunication     Subjective:     C/p shortness of breath with minimal exertion.  Huffing and puffing with conversation    Oxygenation stable     Review of Systems: BID  traZODone (DESYREL) tab 50 mg, 50 mg, Oral, Nightly  dextrose 5 % and 0.9 % NaCl infusion, , Intravenous, Continuous  Albuterol Sulfate  (90 Base) MCG/ACT inhaler 2 puff, 2 puff, Inhalation, Q6H PRN  apixaban (ELIQUIS) tab 5 mg, 5 mg, Oral, BID 30* 28* 16   CREATSERUM 4.25* 3.19* 1.59*   GFRAA 14* 20* 47*   GFRNAA 13* 18* 41*   CA 10.1 8.1* 7.7*   ALB 4.0 3.1*  --     138 139   K 4.2 4.2 3.9    111 111   CO2 16.0* 22.0 21.0   ALKPHO 77 58  --    AST 11* 8*  --    ALT 13 12*  --    YOJANA Labs   Lab 08/29/21  1417 08/30/21  0440 08/31/21  0537   PHOS 1.8*  --  2.9   ALB 4.0 3.1*  --        XR CHEST AP PORTABLE  (CPT=71045)    Result Date: 8/29/2021  CONCLUSION:  1. No acute findings.     Dictated by (CST): Aliza Jones MD on 8/29/

## 2021-08-31 NOTE — PLAN OF CARE
Problem: Patient Centered Care  Goal: Patient preferences are identified and integrated in the patient's plan of care  Description: Interventions:  - What would you like us to know as we care for you? From home with parents.   - Provide timely, complete, relaxation techniques  - Monitor for opioid side effects  - Notify MD/LIP if interventions unsuccessful or patient reports new pain  - Anticipate increased pain with activity and pre-medicate as appropriate  Outcome: Progressing     Problem: RISK FOR INFEC cognitive ability or social support system  Outcome: Progressing     Problem: Altered Communication/Language Barrier  Goal: Patient/Family is able to understand and participate in their care  Description: Interventions:  - Assess communication ability and

## 2021-09-01 VITALS
HEIGHT: 67 IN | BODY MASS INDEX: 36.63 KG/M2 | DIASTOLIC BLOOD PRESSURE: 63 MMHG | SYSTOLIC BLOOD PRESSURE: 114 MMHG | RESPIRATION RATE: 17 BRPM | TEMPERATURE: 98 F | OXYGEN SATURATION: 100 % | WEIGHT: 233.38 LBS | HEART RATE: 54 BPM

## 2021-09-01 LAB
ANION GAP SERPL CALC-SCNC: 4 MMOL/L (ref 0–18)
BASOPHILS # BLD AUTO: 0.01 X10(3) UL (ref 0–0.2)
BASOPHILS NFR BLD AUTO: 0.3 %
BUN BLD-MCNC: 11 MG/DL (ref 7–18)
BUN/CREAT SERPL: 10.8 (ref 10–20)
CALCIUM BLD-MCNC: 8.2 MG/DL (ref 8.5–10.1)
CHLORIDE SERPL-SCNC: 115 MMOL/L (ref 98–112)
CO2 SERPL-SCNC: 22 MMOL/L (ref 21–32)
CREAT BLD-MCNC: 1.02 MG/DL
DEPRECATED RDW RBC AUTO: 44.5 FL (ref 35.1–46.3)
EOSINOPHIL # BLD AUTO: 0.22 X10(3) UL (ref 0–0.7)
EOSINOPHIL NFR BLD AUTO: 6 %
ERYTHROCYTE [DISTWIDTH] IN BLOOD BY AUTOMATED COUNT: 13.5 % (ref 11–15)
GLUCOSE BLD-MCNC: 113 MG/DL (ref 70–99)
GLUCOSE BLDC GLUCOMTR-MCNC: 110 MG/DL (ref 70–99)
HCT VFR BLD AUTO: 29.8 %
HGB BLD-MCNC: 9.5 G/DL
IMM GRANULOCYTES # BLD AUTO: 0.01 X10(3) UL (ref 0–1)
IMM GRANULOCYTES NFR BLD: 0.3 %
LYMPHOCYTES # BLD AUTO: 1.2 X10(3) UL (ref 1–4)
LYMPHOCYTES NFR BLD AUTO: 32.9 %
MCH RBC QN AUTO: 28.4 PG (ref 26–34)
MCHC RBC AUTO-ENTMCNC: 31.9 G/DL (ref 31–37)
MCV RBC AUTO: 89 FL
MONOCYTES # BLD AUTO: 0.34 X10(3) UL (ref 0.1–1)
MONOCYTES NFR BLD AUTO: 9.3 %
NEUTROPHILS # BLD AUTO: 1.87 X10 (3) UL (ref 1.5–7.7)
NEUTROPHILS # BLD AUTO: 1.87 X10(3) UL (ref 1.5–7.7)
NEUTROPHILS NFR BLD AUTO: 51.2 %
OSMOLALITY SERPL CALC.SUM OF ELEC: 292 MOSM/KG (ref 275–295)
PLATELET # BLD AUTO: 187 10(3)UL (ref 150–450)
POTASSIUM SERPL-SCNC: 4.6 MMOL/L (ref 3.5–5.1)
RBC # BLD AUTO: 3.35 X10(6)UL
SODIUM SERPL-SCNC: 141 MMOL/L (ref 136–145)
WBC # BLD AUTO: 3.7 X10(3) UL (ref 4–11)

## 2021-09-01 PROCEDURE — 99232 SBSQ HOSP IP/OBS MODERATE 35: CPT | Performed by: INTERNAL MEDICINE

## 2021-09-01 PROCEDURE — 99239 HOSP IP/OBS DSCHRG MGMT >30: CPT | Performed by: HOSPITALIST

## 2021-09-01 RX ORDER — ONDANSETRON 4 MG/1
4 TABLET, FILM COATED ORAL EVERY 8 HOURS PRN
Qty: 30 TABLET | Refills: 0 | Status: SHIPPED | OUTPATIENT
Start: 2021-09-01

## 2021-09-01 NOTE — DISCHARGE SUMMARY
Hospital Discharge Diagnoses: overdose of NSAID accidental or unintentional    Lace+ Score: 78  59-90 High Risk  29-58 Medium Risk  0-28   Low Risk.     TCM Follow-Up Recommendation:  LACE < 29: Low Risk of readmission after discharge from the hospital; [facility]

## 2021-09-01 NOTE — PROGRESS NOTES
Spoke with Dr. Johanna Capellan- toxicology MD zacarias to clear patient for discharge- no follow up needed. MD emphasized patient should stop taking naproxen.  All needs met, ELI escobar

## 2021-09-01 NOTE — PROGRESS NOTES
[facility]     Progress Note        Cierra Spence Patient Status:  Inpatient    10/15/1985 MRN [id number]   Location [facility] 5SW/SE Attending Lacey Ardon MD   [address] Day # 3 PCP DO Mary Peguero tablet, 8 tablet, Oral, Q15 Min PRN  Atropine Sulfate 0.1 MG/ML injection 0.5 mg, 0.5 mg, Intravenous, PRN  nitroGLYCERIN (NITROSTAT) SL tab 0.4 mg, 0.4 mg, Sublingual, Q5 Min PRN  acetaminophen (TYLENOL) tab 650 mg, 650 mg, Oral, Q4H PRN   Or  HYDROcodone NSAID overdose on presentation taking 15 to 20 tablets of naproxen. Evidence of significant alkalosis on presentation. Alkalosis improved. -Admits to ongoing dyspnea with some pleuritic discomfort.   Hospitalized in March 2021 with evidence of right lowe

## 2021-09-01 NOTE — PLAN OF CARE
Problem: Patient Centered Care  Goal: Patient preferences are identified and integrated in the patient's plan of care  Description: Interventions:  - What would you like us to know as we care for you? From home with parents.   - Provide timely, complete, relaxation techniques  - Monitor for opioid side effects  - Notify MD/LIP if interventions unsuccessful or patient reports new pain  - Anticipate increased pain with activity and pre-medicate as appropriate  Outcome: Progressing     Problem: RISK FOR INFEC cognitive ability or social support system  Outcome: Progressing     Monitoring vital signs- stable at this time. No acute distress noted at this time. Remote tele in place. Nocturnal pulse oximetry monitoring in progress. Blood glucose monitoring.  Power Awad

## 2021-09-01 NOTE — PROGRESS NOTES
Discharge RN Summary: Patient has discharge order in. Patient to discharge home with family. IV removed by this RN. Understands to follow up with PCP in 1 week. Patient understands to  meds with printed script.  Emphasized no naproxen per MD SIEGEL

## 2021-09-02 ENCOUNTER — PATIENT OUTREACH (OUTPATIENT)
Dept: CASE MANAGEMENT | Age: 36
End: 2021-09-02

## 2021-09-03 NOTE — DISCHARGE SUMMARY
[facility]    PATIENT'S NAME: Hildabradly Herndon   ATTENDING PHYSICIAN: Duane Larkin MD   PATIENT ACCOUNT#:   [id number]    LOCATION:  [address] [place] [address] Road #:   [id number]       YOB: 1985  ADMISSION DATE: information was given to the patient.   She has currently been deemed stable to be discharged home to her primary care physician as well as her primary obesity specialist.  For details regarding patient's hospitalization, please refer to the patient's chart

## 2021-10-21 ENCOUNTER — HOSPITAL ENCOUNTER (EMERGENCY)
Facility: HOSPITAL | Age: 36
Discharge: LEFT BEFORE DISCHARGE | End: 2021-10-21
Payer: COMMERCIAL

## 2021-10-23 ENCOUNTER — HOSPITAL ENCOUNTER (INPATIENT)
Facility: HOSPITAL | Age: 36
LOS: 5 days | Discharge: HOME | DRG: 682 | End: 2021-10-28
Attending: EMERGENCY MEDICINE | Admitting: INTERNAL MEDICINE
Payer: COMMERCIAL

## 2021-10-23 ENCOUNTER — APPOINTMENT (OUTPATIENT)
Dept: CT IMAGING | Facility: HOSPITAL | Age: 36
DRG: 682 | End: 2021-10-23
Attending: EMERGENCY MEDICINE
Payer: COMMERCIAL

## 2021-10-23 DIAGNOSIS — N17.9 ACUTE RENAL FAILURE, UNSPECIFIED ACUTE RENAL FAILURE TYPE (HCC): Primary | ICD-10-CM

## 2021-10-23 DIAGNOSIS — R10.9 FLANK PAIN: ICD-10-CM

## 2021-10-23 DIAGNOSIS — R31.9 HEMATURIA, UNSPECIFIED TYPE: ICD-10-CM

## 2021-10-23 PROBLEM — E87.20 METABOLIC ACIDOSIS: Status: ACTIVE | Noted: 2021-10-23

## 2021-10-23 PROBLEM — E87.2 METABOLIC ACIDOSIS: Status: ACTIVE | Noted: 2021-10-23

## 2021-10-23 PROBLEM — D64.9 ANEMIA: Status: ACTIVE | Noted: 2021-10-23

## 2021-10-23 PROBLEM — R73.9 HYPERGLYCEMIA: Status: ACTIVE | Noted: 2021-10-23

## 2021-10-23 PROCEDURE — 99223 1ST HOSP IP/OBS HIGH 75: CPT | Performed by: INTERNAL MEDICINE

## 2021-10-23 PROCEDURE — 74176 CT ABD & PELVIS W/O CONTRAST: CPT | Performed by: EMERGENCY MEDICINE

## 2021-10-23 RX ORDER — PROCHLORPERAZINE EDISYLATE 5 MG/ML
5 INJECTION INTRAMUSCULAR; INTRAVENOUS EVERY 8 HOURS PRN
Status: DISCONTINUED | OUTPATIENT
Start: 2021-10-23 | End: 2021-10-28

## 2021-10-23 RX ORDER — TOPIRAMATE 25 MG/1
50 TABLET ORAL 2 TIMES DAILY
Status: DISCONTINUED | OUTPATIENT
Start: 2021-10-23 | End: 2021-10-28

## 2021-10-23 RX ORDER — ALBUTEROL SULFATE 90 UG/1
2 AEROSOL, METERED RESPIRATORY (INHALATION) EVERY 6 HOURS PRN
Status: DISCONTINUED | OUTPATIENT
Start: 2021-10-23 | End: 2021-10-28

## 2021-10-23 RX ORDER — HYDROMORPHONE HYDROCHLORIDE 1 MG/ML
1 INJECTION, SOLUTION INTRAMUSCULAR; INTRAVENOUS; SUBCUTANEOUS EVERY 30 MIN PRN
Status: DISCONTINUED | OUTPATIENT
Start: 2021-10-23 | End: 2021-10-23

## 2021-10-23 RX ORDER — TRAZODONE HYDROCHLORIDE 50 MG/1
50 TABLET ORAL NIGHTLY
Status: DISCONTINUED | OUTPATIENT
Start: 2021-10-23 | End: 2021-10-27

## 2021-10-23 RX ORDER — HYDROMORPHONE HYDROCHLORIDE 1 MG/ML
0.2 INJECTION, SOLUTION INTRAMUSCULAR; INTRAVENOUS; SUBCUTANEOUS EVERY 2 HOUR PRN
Status: DISCONTINUED | OUTPATIENT
Start: 2021-10-23 | End: 2021-10-24

## 2021-10-23 RX ORDER — NORETHINDRONE ACETATE AND ETHINYL ESTRADIOL 1MG-20(21)
1 KIT ORAL DAILY
Status: DISCONTINUED | OUTPATIENT
Start: 2021-10-23 | End: 2021-10-28

## 2021-10-23 RX ORDER — ONDANSETRON 2 MG/ML
4 INJECTION INTRAMUSCULAR; INTRAVENOUS EVERY 6 HOURS PRN
Status: DISCONTINUED | OUTPATIENT
Start: 2021-10-23 | End: 2021-10-28

## 2021-10-23 RX ORDER — CLONAZEPAM 0.5 MG/1
0.5 TABLET ORAL 2 TIMES DAILY PRN
Status: DISCONTINUED | OUTPATIENT
Start: 2021-10-23 | End: 2021-10-27

## 2021-10-23 RX ORDER — HYDROMORPHONE HYDROCHLORIDE 1 MG/ML
0.8 INJECTION, SOLUTION INTRAMUSCULAR; INTRAVENOUS; SUBCUTANEOUS EVERY 2 HOUR PRN
Status: DISCONTINUED | OUTPATIENT
Start: 2021-10-23 | End: 2021-10-24

## 2021-10-23 RX ORDER — FLUOXETINE HYDROCHLORIDE 20 MG/1
60 CAPSULE ORAL DAILY
Status: DISCONTINUED | OUTPATIENT
Start: 2021-10-24 | End: 2021-10-28

## 2021-10-23 RX ORDER — HYDROMORPHONE HYDROCHLORIDE 1 MG/ML
0.4 INJECTION, SOLUTION INTRAMUSCULAR; INTRAVENOUS; SUBCUTANEOUS EVERY 2 HOUR PRN
Status: DISCONTINUED | OUTPATIENT
Start: 2021-10-23 | End: 2021-10-24

## 2021-10-23 RX ORDER — ACETAMINOPHEN 500 MG
2000 TABLET ORAL
COMMUNITY
End: 2021-11-15

## 2021-10-23 RX ORDER — SODIUM CHLORIDE 9 MG/ML
INJECTION, SOLUTION INTRAVENOUS CONTINUOUS
Status: DISCONTINUED | OUTPATIENT
Start: 2021-10-23 | End: 2021-10-28

## 2021-10-23 NOTE — ED PROVIDER NOTES
Patient Seen in: [facility] Emergency Department      History   Patient presents with:  Pain    Stated Complaint: pt called ems from work for back pain/\"kidney pain\"  since wed - tried ED=5hr w*    Subjective:   HPI    Patient is a 75-year-old fem socially    Drug use: No             Review of Systems    Positive for stated complaint: pt called ems from work for back pain/\"kidney pain\"  since wed - tried ED=5hr w*  Other systems are as noted in HPI. Constitutional and vital signs reviewed.       A Urine 100  (*)     RBC Urine >10 (*)     Bacteria Urine 2+ (*)     Squamous Epi.  Cells Few (*)     All other components within normal limits   COMP METABOLIC PANEL (14) - Abnormal; Notable for the following components:    Glucose 100 (*)     CO2 18.0 (*) Data Registry) which includes the Dose Index Registry. PATIENT STATED HISTORY: (As transcribed by Technologist)  Having back pain since wednesday. FINDINGS:  KIDNEYS:  No hydronephrosis, nephrolithiasis or obstructing urinary calculus.   There is slight [facility].  Workup and results were discussed with patient. Patient has no other questions, complaints or concerns. Patient will be admitted to the hospital for further workup.         Admission disposition: 10/23/2021  4:48 PM

## 2021-10-23 NOTE — ED INITIAL ASSESSMENT (HPI)
Pt called ems for kidney pain since Wednesday  Ems gave fentanyl 95mcg in field - pain 5/10  On eliquis for hx PE

## 2021-10-23 NOTE — ED QUICK NOTES
Orders for admission, patient is aware of plan and ready to go upstairs. Any questions, please call ED RN Lilia  at extension [phone]. Vaccinated?  Yes  Type of COVID test sent: Rapid  COVID Suspicion level: Low    Titratable drug(s) infusing:  Rate:    L

## 2021-10-24 PROCEDURE — 99255 IP/OBS CONSLTJ NEW/EST HI 80: CPT | Performed by: INTERNAL MEDICINE

## 2021-10-24 PROCEDURE — 99232 SBSQ HOSP IP/OBS MODERATE 35: CPT | Performed by: INTERNAL MEDICINE

## 2021-10-24 RX ORDER — MORPHINE SULFATE 2 MG/ML
1 INJECTION, SOLUTION INTRAMUSCULAR; INTRAVENOUS EVERY 2 HOUR PRN
Status: DISCONTINUED | OUTPATIENT
Start: 2021-10-24 | End: 2021-10-25

## 2021-10-24 RX ORDER — MORPHINE SULFATE 2 MG/ML
2 INJECTION, SOLUTION INTRAMUSCULAR; INTRAVENOUS EVERY 2 HOUR PRN
Status: DISCONTINUED | OUTPATIENT
Start: 2021-10-24 | End: 2021-10-25

## 2021-10-24 RX ORDER — MORPHINE SULFATE 4 MG/ML
4 INJECTION, SOLUTION INTRAMUSCULAR; INTRAVENOUS EVERY 2 HOUR PRN
Status: DISCONTINUED | OUTPATIENT
Start: 2021-10-24 | End: 2021-10-25

## 2021-10-24 NOTE — CONSULTS
[facility]  Report of Consultation    Lorie Johnson Patient Status:  Inpatient    10/15/1985 MRN [id number]   [facility] 5NW-A Attending Kacie Canut MD   [facility] Day # 1 PCP Walt Abrams DO     Reason for Consultation:  Kamini Daniels tobacco use about 2 years ago. She reports current alcohol use. She reports that she does not use drugs.     Allergies:    Shellfish Allergy       ANAPHYLAXIS    Comment:epiglottitis  Shellfish-Derived P*    ANAPHYLAXIS  Tramadol                NAUSEA AND V fever/chills  Denies wt loss/gain  Denies HA or visual changes  Denies CP or palpitations  Denies SOB/cough/hemoptysis  + abd and flank pain  Denies N/V/D  Denies change in urinary habits   + gross hematuria  Denies LE edema  Denies skin rashes/myalgias/ar BILT 0.4 10/24/2021    TP 6.3 10/24/2021    AST 9 10/24/2021    ALT 16 10/24/2021       Glucose (mg/dL)   Date Value   01/07/2016 86     BUN (mg/dL)   Date Value   10/24/2021 20 (H)   10/23/2021 23 (H)   09/01/2021 11     Blood Urea Nitrogen (mg/dL)   Date rocephin        Thank you for allowing me to participate in the care of your patient. Please do not hesitate to call with any questions or concerns.        Jeramie Lockett MD  10/24/2021  8:57 AM

## 2021-10-24 NOTE — CONSULTS
[facility]  Report of Consultation    Lorie Stephenson Patient Status:  Inpatient    10/15/1985 MRN [id number]   [facility] 5NW-A Attending Alecia Baird MD   [facility] Day # 1 PCP Morgan Ford DO     Impression and Plan:  Fabi Malone VOMITING  Wellbutrin Xl [[place]*    RASH    Medications:    acetaminophen 500 MG Oral Tab, Take 2,000 mg by mouth daily as needed for Pain., Disp: , Rfl:   apixaban 5 MG Oral Tab, Take 1 tablet (5 mg total) by mouth 2 (two) times daily. , Disp: 30 tablet, Rfl tablet, Rfl: 2  Norethin Ace-Eth Estrad-FE 1-20 MG-MCG Oral Tab, Take 1 tablet by mouth daily. , Disp: 3 Package, Rfl: 3  Fluticasone Furoate-Vilanterol 200-25 MCG/INH Inhalation Aerosol Powder, Breath Activated, Inhale 1 puff into the lungs daily. , Disp: 1 Procedure Laterality Date   • CHOLECYSTECTOMY     • OTHER SURGICAL HISTORY      Deviated septum   • REPAIR ROTATOR CUFF,ACUTE     • REPAIR ROTATOR CUFF,CHRONIC      left   • SPINAL CORD NEUROSTIMULATOR         Family History   Problem Relation Age of Ons noted.  Without clubbing or cyanosis. Skin: Normal texture and turgor. Neurologic:  Motor strength and sensory examination is grossly normal.  No focal neurologic deficit.     Laboratory Data:  Lab Results   Component Value Date    WBC 4.7 10/24/2021

## 2021-10-24 NOTE — PROGRESS NOTES
[facility]  Progress Note    Frank Mcqueen Patient Status:  Inpatient    10/15/1985 MRN [id number]   [facility] 5NW-A Attending Taco Juarez MD   [facility] Day # 1 PCP Danette Russo DO     CC: Flank pain, abdominal pain, hemat CREATSERUM 2.16 10/24/2021    BUN 20 10/24/2021     10/24/2021    K 3.9 10/24/2021     10/24/2021    CO2 20.0 10/24/2021    GLU 97 10/24/2021    CA 8.3 10/24/2021    ALB 2.6 10/24/2021    ALKPHO 60 10/24/2021    BILT 0.4 10/24/2021    TP 6.3 10 tomorrow  3. Primary ovarian failure  · Patient takes birth control pill at home for this  4. Mild intermittent asthma  · Continue home inhalers  5.  History of PE  · Patient takes Eliquis at home for this which we will hold at this time due to hematuria  6

## 2021-10-24 NOTE — PLAN OF CARE
Received patient from ER a/ox4, crying out loudly in pain. Reports that dilaudid didn't do anthing for pain. Occasional loud dry heaves. IV fluids initiated as ordered. Patient urinated in the bathroom and RN observed dark red blood.  Dr. Renetta Carrillo said we maintained  Description: INTERVENTIONS:  - Monitor labs and assess for signs and symptoms of volume excess or deficit  - Monitor intake, output and patient weight  - Monitor urine specific gravity, serum osmolarity and serum sodium as indicated or ordered

## 2021-10-24 NOTE — PLAN OF CARE
Pt is aox4, VSS, afebrile. RA. Tele NSR. Urine is dark red blood. C/o pain in flank, see MAR. Up self to use the bathroom. VF. Regular diet tolerating well though with poor appetite. Resting in bed with call light in place.  Blood thinners on hold, birth co weight  - Monitor urine specific gravity, serum osmolarity and serum sodium as indicated or ordered  - Monitor response to interventions for patient's volume status, including labs, urine output, blood pressure (other measures as available)  - Encourage or

## 2021-10-24 NOTE — H&P
[facility]                                                               History & Physical         Frank Mcqueen Patient Status:  Inpatient    10/15/1985 MRN [id number]   [facility] 5NW-A Attending Taco Juarez MD   H HISTORY      Deviated septum   • REPAIR ROTATOR CUFF,ACUTE     • REPAIR ROTATOR CUFF,CHRONIC      left   • SPINAL CORD NEUROSTIMULATOR         Family history:  Family History   Problem Relation Age of Onset   • Heart Disorder Father 39        CAD s/p bypas Take in am and 4 pm, Disp: 180 tablet, Rfl: 3, 10/23/2021 at Unknown time  Diethylpropion HCl 25 MG Oral Tab, Take 25 mg by mouth 2 (two) times daily with meals.  With breakfast and dinner, Disp: 60 tablet, Rfl: 0, 10/23/2021 at Unknown time  ubrogepant (UB Rfl: 0, Past Week at Unknown time  cetirizine 10 MG Oral Tab, Take 10 mg by mouth in the morning, at noon, and at bedtime. , Disp: , Rfl: , 10/23/2021 at Unknown time  Olopatadine HCl 0.1 % Ophthalmic Solution, Place 1 drop into both eyes as needed. , Disp: back pain/kidney pain  since wed - tried ED=5hr wait, ems gave Fentanyl 95mcg in field = 5/10       TECHNIQUE:  Unenhanced multislice CT scanning from above the kidneys to below the urinary bladder.  2D rendering are generated on the CT scanner workstation hematuria.       Colonic diverticulosis.       Normal caliber appendix.         Dictated by (CST): Adria Pandya MD on 10/23/2021 at 4:14 PM              ASSESSMENT / PLAN:     1. Acute kidney injury on chronic kidney disease stage III  1. IV fluids  2.  Laila Pain

## 2021-10-24 NOTE — PLAN OF CARE
Patient writhing in pain, crying and sobbing. States pain is in flank area, constant and sharp. Left >R. She reports that dilaudid did not help. Notified Dr. Melissa Wing of pain and also that she is urinating dark blood. Orders for fentanyl received.

## 2021-10-25 ENCOUNTER — ANESTHESIA EVENT (OUTPATIENT)
Dept: SURGERY | Facility: HOSPITAL | Age: 36
DRG: 682 | End: 2021-10-25
Payer: COMMERCIAL

## 2021-10-25 ENCOUNTER — ANESTHESIA (OUTPATIENT)
Dept: SURGERY | Facility: HOSPITAL | Age: 36
DRG: 682 | End: 2021-10-25
Payer: COMMERCIAL

## 2021-10-25 ENCOUNTER — APPOINTMENT (OUTPATIENT)
Dept: GENERAL RADIOLOGY | Facility: HOSPITAL | Age: 36
DRG: 682 | End: 2021-10-25
Attending: UROLOGY
Payer: COMMERCIAL

## 2021-10-25 PROCEDURE — BT14ZZZ FLUOROSCOPY OF KIDNEYS, URETERS AND BLADDER: ICD-10-PCS | Performed by: UROLOGY

## 2021-10-25 PROCEDURE — 99233 SBSQ HOSP IP/OBS HIGH 50: CPT | Performed by: INTERNAL MEDICINE

## 2021-10-25 PROCEDURE — 99232 SBSQ HOSP IP/OBS MODERATE 35: CPT | Performed by: INTERNAL MEDICINE

## 2021-10-25 RX ORDER — HYDROCODONE BITARTRATE AND ACETAMINOPHEN 10; 325 MG/1; MG/1
2 TABLET ORAL AS NEEDED
Status: DISCONTINUED | OUTPATIENT
Start: 2021-10-25 | End: 2021-10-25 | Stop reason: HOSPADM

## 2021-10-25 RX ORDER — HYDROMORPHONE HYDROCHLORIDE 1 MG/ML
0.8 INJECTION, SOLUTION INTRAMUSCULAR; INTRAVENOUS; SUBCUTANEOUS EVERY 2 HOUR PRN
Status: DISCONTINUED | OUTPATIENT
Start: 2021-10-25 | End: 2021-10-27

## 2021-10-25 RX ORDER — DEXAMETHASONE SODIUM PHOSPHATE 4 MG/ML
VIAL (ML) INJECTION AS NEEDED
Status: DISCONTINUED | OUTPATIENT
Start: 2021-10-25 | End: 2021-10-25 | Stop reason: SURG

## 2021-10-25 RX ORDER — SODIUM CHLORIDE, SODIUM LACTATE, POTASSIUM CHLORIDE, CALCIUM CHLORIDE 600; 310; 30; 20 MG/100ML; MG/100ML; MG/100ML; MG/100ML
INJECTION, SOLUTION INTRAVENOUS CONTINUOUS
Status: DISCONTINUED | OUTPATIENT
Start: 2021-10-25 | End: 2021-10-28

## 2021-10-25 RX ORDER — HYDROMORPHONE HYDROCHLORIDE 1 MG/ML
0.4 INJECTION, SOLUTION INTRAMUSCULAR; INTRAVENOUS; SUBCUTANEOUS EVERY 2 HOUR PRN
Status: DISCONTINUED | OUTPATIENT
Start: 2021-10-25 | End: 2021-10-27

## 2021-10-25 RX ORDER — METOCLOPRAMIDE HYDROCHLORIDE 5 MG/ML
10 INJECTION INTRAMUSCULAR; INTRAVENOUS AS NEEDED
Status: ACTIVE | OUTPATIENT
Start: 2021-10-25 | End: 2021-10-26

## 2021-10-25 RX ORDER — MEPERIDINE HYDROCHLORIDE 25 MG/ML
12.5 INJECTION INTRAMUSCULAR; INTRAVENOUS; SUBCUTANEOUS AS NEEDED
Status: DISCONTINUED | OUTPATIENT
Start: 2021-10-25 | End: 2021-10-27

## 2021-10-25 RX ORDER — LIDOCAINE HYDROCHLORIDE 10 MG/ML
INJECTION, SOLUTION EPIDURAL; INFILTRATION; INTRACAUDAL; PERINEURAL AS NEEDED
Status: DISCONTINUED | OUTPATIENT
Start: 2021-10-25 | End: 2021-10-25 | Stop reason: SURG

## 2021-10-25 RX ORDER — ONDANSETRON 2 MG/ML
4 INJECTION INTRAMUSCULAR; INTRAVENOUS AS NEEDED
Status: ACTIVE | OUTPATIENT
Start: 2021-10-25 | End: 2021-10-26

## 2021-10-25 RX ORDER — MIDAZOLAM HYDROCHLORIDE 1 MG/ML
1 INJECTION INTRAMUSCULAR; INTRAVENOUS EVERY 5 MIN PRN
Status: ACTIVE | OUTPATIENT
Start: 2021-10-25 | End: 2021-10-26

## 2021-10-25 RX ORDER — HYDROCODONE BITARTRATE AND ACETAMINOPHEN 10; 325 MG/1; MG/1
1 TABLET ORAL AS NEEDED
Status: COMPLETED | OUTPATIENT
Start: 2021-10-25 | End: 2021-10-26

## 2021-10-25 RX ORDER — ONDANSETRON 2 MG/ML
4 INJECTION INTRAMUSCULAR; INTRAVENOUS AS NEEDED
Status: DISCONTINUED | OUTPATIENT
Start: 2021-10-25 | End: 2021-10-25 | Stop reason: HOSPADM

## 2021-10-25 RX ORDER — HYDROMORPHONE HYDROCHLORIDE 1 MG/ML
0.4 INJECTION, SOLUTION INTRAMUSCULAR; INTRAVENOUS; SUBCUTANEOUS EVERY 5 MIN PRN
Status: ACTIVE | OUTPATIENT
Start: 2021-10-25 | End: 2021-10-26

## 2021-10-25 RX ORDER — HYDROMORPHONE HYDROCHLORIDE 1 MG/ML
0.2 INJECTION, SOLUTION INTRAMUSCULAR; INTRAVENOUS; SUBCUTANEOUS EVERY 2 HOUR PRN
Status: DISCONTINUED | OUTPATIENT
Start: 2021-10-25 | End: 2021-10-27

## 2021-10-25 RX ORDER — MIDAZOLAM HYDROCHLORIDE 1 MG/ML
INJECTION INTRAMUSCULAR; INTRAVENOUS AS NEEDED
Status: DISCONTINUED | OUTPATIENT
Start: 2021-10-25 | End: 2021-10-25 | Stop reason: SURG

## 2021-10-25 RX ORDER — HYDROMORPHONE HYDROCHLORIDE 1 MG/ML
0.4 INJECTION, SOLUTION INTRAMUSCULAR; INTRAVENOUS; SUBCUTANEOUS EVERY 5 MIN PRN
Status: DISCONTINUED | OUTPATIENT
Start: 2021-10-25 | End: 2021-10-25 | Stop reason: HOSPADM

## 2021-10-25 RX ORDER — METOCLOPRAMIDE HYDROCHLORIDE 5 MG/ML
10 INJECTION INTRAMUSCULAR; INTRAVENOUS AS NEEDED
Status: DISCONTINUED | OUTPATIENT
Start: 2021-10-25 | End: 2021-10-25 | Stop reason: HOSPADM

## 2021-10-25 RX ORDER — MIDAZOLAM HYDROCHLORIDE 1 MG/ML
1 INJECTION INTRAMUSCULAR; INTRAVENOUS EVERY 5 MIN PRN
Status: DISCONTINUED | OUTPATIENT
Start: 2021-10-25 | End: 2021-10-25 | Stop reason: HOSPADM

## 2021-10-25 RX ORDER — MEPERIDINE HYDROCHLORIDE 25 MG/ML
12.5 INJECTION INTRAMUSCULAR; INTRAVENOUS; SUBCUTANEOUS AS NEEDED
Status: DISCONTINUED | OUTPATIENT
Start: 2021-10-25 | End: 2021-10-25 | Stop reason: HOSPADM

## 2021-10-25 RX ORDER — NALOXONE HYDROCHLORIDE 0.4 MG/ML
80 INJECTION, SOLUTION INTRAMUSCULAR; INTRAVENOUS; SUBCUTANEOUS AS NEEDED
Status: DISCONTINUED | OUTPATIENT
Start: 2021-10-25 | End: 2021-10-25 | Stop reason: HOSPADM

## 2021-10-25 RX ORDER — NALOXONE HYDROCHLORIDE 0.4 MG/ML
80 INJECTION, SOLUTION INTRAMUSCULAR; INTRAVENOUS; SUBCUTANEOUS AS NEEDED
Status: ACTIVE | OUTPATIENT
Start: 2021-10-25 | End: 2021-10-26

## 2021-10-25 RX ORDER — HYDROCODONE BITARTRATE AND ACETAMINOPHEN 10; 325 MG/1; MG/1
2 TABLET ORAL AS NEEDED
Status: COMPLETED | OUTPATIENT
Start: 2021-10-25 | End: 2021-10-26

## 2021-10-25 RX ORDER — HYDROCODONE BITARTRATE AND ACETAMINOPHEN 10; 325 MG/1; MG/1
1 TABLET ORAL AS NEEDED
Status: DISCONTINUED | OUTPATIENT
Start: 2021-10-25 | End: 2021-10-25 | Stop reason: HOSPADM

## 2021-10-25 RX ORDER — SODIUM CHLORIDE, SODIUM LACTATE, POTASSIUM CHLORIDE, CALCIUM CHLORIDE 600; 310; 30; 20 MG/100ML; MG/100ML; MG/100ML; MG/100ML
INJECTION, SOLUTION INTRAVENOUS CONTINUOUS
Status: DISCONTINUED | OUTPATIENT
Start: 2021-10-25 | End: 2021-10-25 | Stop reason: HOSPADM

## 2021-10-25 RX ORDER — HYDROMORPHONE HYDROCHLORIDE 1 MG/ML
0.5 INJECTION, SOLUTION INTRAMUSCULAR; INTRAVENOUS; SUBCUTANEOUS EVERY 2 HOUR PRN
Status: DISCONTINUED | OUTPATIENT
Start: 2021-10-25 | End: 2021-10-27

## 2021-10-25 RX ADMIN — MIDAZOLAM HYDROCHLORIDE 2 MG: 1 INJECTION INTRAMUSCULAR; INTRAVENOUS at 21:27:00

## 2021-10-25 RX ADMIN — ONDANSETRON 4 MG: 2 INJECTION INTRAMUSCULAR; INTRAVENOUS at 21:52:00

## 2021-10-25 RX ADMIN — HYDROMORPHONE HYDROCHLORIDE 0.5 MG: 1 INJECTION, SOLUTION INTRAMUSCULAR; INTRAVENOUS; SUBCUTANEOUS at 21:27:00

## 2021-10-25 RX ADMIN — LIDOCAINE HYDROCHLORIDE 50 MG: 10 INJECTION, SOLUTION EPIDURAL; INFILTRATION; INTRACAUDAL; PERINEURAL at 21:26:00

## 2021-10-25 RX ADMIN — DEXAMETHASONE SODIUM PHOSPHATE 4 MG: 4 MG/ML VIAL (ML) INJECTION at 21:52:00

## 2021-10-25 NOTE — PROGRESS NOTES
10/25/21 0000   Provider Notification   Reason for Communication Other (comment)  (Increased pain)   Provider Name Other (comment)  Osker Roots)   Method of Communication Page   Response Phone call   Notification Time 0030       Dr. Jessica owen

## 2021-10-25 NOTE — PROGRESS NOTES
[facility]  Urology Progress Note    [address] Patient Status:  Inpatient    10/15/1985 MRN [id number]   [facility] 5NW-A Attending Cy Clement MD   [facility] Day # 2 PCP Alec Rodriguez DO     Subjective:  Mikaela Gaines

## 2021-10-25 NOTE — BH PROGRESS NOTE
[facility] Resource Referral Counselor Note    Indira Smoker Patient Status:  Inpatient    10/15/1985 MRN [id number]   [facility] 5NW-A Attending Kamini Navarro MD   [facility] Day # 2 PCP DO ROBBIN Lakhani(subjective

## 2021-10-25 NOTE — PLAN OF CARE
Pt is aox4, VSS, afebrile. RA. Tele NSR. Urine is dark red blood. C/o pain in flank, see MAR. Up self to use the bathroom. IVF. NPO for procedure. Resting in bed with call light in place. Blood thinners on hold. No c/o SOB or n/v/d.  Cysto this evening at 1

## 2021-10-25 NOTE — PLAN OF CARE
Multidisciplinary Discharge Rounds held 10/24/2021. Treatment team members present today include , , Charge Nurse, Nurse, RT, PT and Pharmacy caring for [employer].      Other care providers present:    Mobility Go goal  Description: INTERVENTIONS:  - Encourage pt to monitor pain and request assistance  - Assess pain using appropriate pain scale  - Administer analgesics based on type and severity of pain and evaluate response  - Implement non-pharmacological measures

## 2021-10-25 NOTE — PROGRESS NOTES
[facility]     Nephrology Progress Note    Mary Green Patient Status:  Inpatient    10/15/1985 MRN [id number]   [facility] 5NW-A Attending Sugey Williamson MD   [facility] Day # 2 PCP Connie Canseco DO       SUBJECTIVE:  She repo ALB 3.4 2.6*       No results for input(s): PGLU in the last 168 hours.     Meds:   HYDROmorphone HCl (DILAUDID) 1 MG/ML injection 0.2 mg, 0.2 mg, Intravenous, Q2H PRN   Or  HYDROmorphone HCl (DILAUDID) 1 MG/ML injection 0.4 mg, 0.4 mg, Intravenous, Q2H P unrevealing in this regard. May be due to infection in setting of [facility]. Consider GN as well depending on clinical course and work up. Urology has evaluated with plan for cystoscopy today     #3. Chronic pain- s/p stimulator placement.   Pain mgmt per reggie

## 2021-10-25 NOTE — PROGRESS NOTES
[facility]  Progress Note    Alicia Foster Patient Status:  Inpatient    10/15/1985 MRN [id number]   [facility] 5NW-A Attending Yuliana Duncan MD   [address] Day # 2 PCP Patience Post DO     CC: Flank pain, abdominal pain, hemat 10/25/2021    K 4.0 10/25/2021     10/25/2021    CO2 17.0 10/25/2021    GLU 77 10/25/2021    CA 8.0 10/25/2021       Imaging:  Imaging reviewed in Epic.     Meds:   HYDROmorphone HCl (DILAUDID) 1 MG/ML injection 0.2 mg, 0.2 mg, Intravenous, Q2H PRN of PE  · Patient takes Eliquis at home for this which we will hold at this time due to hematuria  6. Chronic migraine  · Patient takes Topamax at home for migraine prophylaxis  7.  Patient takes Metformin at home for weight loss which was held at this time

## 2021-10-26 PROCEDURE — 99232 SBSQ HOSP IP/OBS MODERATE 35: CPT | Performed by: INTERNAL MEDICINE

## 2021-10-26 PROCEDURE — 99233 SBSQ HOSP IP/OBS HIGH 50: CPT | Performed by: INTERNAL MEDICINE

## 2021-10-26 NOTE — ANESTHESIA PREPROCEDURE EVALUATION
PRE-OP EVALUATION    Patient Name: Tana Ndiaye    Admit Diagnosis: Flank pain [R10.9]  Acute renal failure, unspecified acute renal failure type ([address] [N17.9]  Hematuria, unspecified type [R31.9]    Pre-op Diagnosis: Hematuria [R31.9]    CYST Q24H  fentaNYL citrate (SUBLIMAZE) 0.05 MG/ML injection 25 mcg, 25 mcg, Intravenous, Once        Outpatient Medications:   acetaminophen 500 MG Oral Tab, Take 2,000 mg by mouth daily as needed for Pain., Disp: , Rfl: , 10/22/2021 at Unknown time  apixaban time  traZODone HCl 50 MG Oral Tab, Take 0.5-1 tablets (25-50 mg total) by mouth nightly as needed for Sleep.  (Patient taking differently: Take 50 mg by mouth nightly.), Disp: 30 tablet, Rfl: 2, 10/22/2021 at Unknown time  clonazePAM 0.5 MG Oral Tab, Take Years: 15.00        Pack years: 15      Smokeless tobacco: Former User        Quit date: 8/1/2019    Alcohol use: Yes      Alcohol/week: 0.0 standard drinks      Comment: socially      Drug use: No     Available pre-op labs reviewed.   Lab Results   Compone

## 2021-10-26 NOTE — ANESTHESIA POSTPROCEDURE EVALUATION
[address] [facility] [address] Patient Status:  Inpatient   Age/Gender 43year old female MRN [id number]   [facility] SURGERY Attending Tanisha Martino, [address] [facility] [address] Day # 2 PCP Tara Sam DO       Anesthesia Post-op Note    CYSTOS

## 2021-10-26 NOTE — OPERATIVE REPORT
[address] Patient Status:  Inpatient    10/15/1985 MRN [id number]   [facility] SURGERY Attending Alecia Baird MD   [facility] Day # 2 PCP Morgan Ford DO     Date of Operation:  10/25/2021    Procedure: Raysa Daniel ureteral orifice. The left ureteral orifice had a clot emanating from it. Water-soluble contrast was injected retrograde into the left ureter and I could see clot move all the way back up towards the renal pelvis. There is very mild hydronephrosis.  I pa

## 2021-10-26 NOTE — PROGRESS NOTES
[facility]  Progress Note    Jarad First Patient Status:  Inpatient    10/15/1985 MRN [id number]   [facility] 5NW-A Attending Aliza Rosas MD   [facility] Day # 3 PCP Malon Crigler, DO     CC: Flank pain, abdominal pain, hemat CREATSERUM 1.24 10/26/2021    BUN 10 10/26/2021     10/26/2021    K 4.3 10/26/2021     10/26/2021    CO2 17.0 10/26/2021    GLU 97 10/26/2021    CA 8.1 10/26/2021       Imaging:  Imaging reviewed in Epic.     Meds:   HYDROmorphone HCl (DILAUDID) left ureteral orifice which was irrigated by urology on 10/25/2021  3. Primary ovarian failure  · Patient takes birth control pill at home for this  4. Mild intermittent asthma  · Continue home inhalers  5.  History of PE  · Patient takes Eliquis at home fo

## 2021-10-26 NOTE — PROGRESS NOTES
[facility]     Nephrology Progress Note    Bryson Marin Patient Status:  Inpatient    10/15/1985 MRN [id number]   [facility] 5NW-A Attending Reena Park MD   [address] Day # 3 PCP Shanell Russell DO       SUBJECTIVE:     No ac Impression/Plan:      1. RAVI-  in association with gross hematuria. She denies recent NSAIDS or other OTC/herbal preparations.   RAVI is most likely due to prerenal azotemia  +/- obstruction (see Dr. Mala Kramer op note)   Improving  - monitor labs  - a

## 2021-10-26 NOTE — PLAN OF CARE
Multidisciplinary Discharge Rounds held 10/26/2021. Treatment team members present today include , , Charge Nurse, Nurse, RT, PT and Pharmacy caring for [employer].      Other care providers present:    Mobility Go Encourage pt to monitor pain and request assistance  - Assess pain using appropriate pain scale  - Administer analgesics based on type and severity of pain and evaluate response  - Implement non-pharmacological measures as appropriate and evaluate response

## 2021-10-26 NOTE — PROGRESS NOTES
[facility]  Urology Progress Note    [address] Patient Status:  Inpatient    10/15/1985 MRN [id number]   [facility] 5NW-A Attending Tanisha Martino MD   [facility] Day # 3 PCP Tara Sam DO     Subjective:  Kermit Wilkerson EDGAR [address] [facility] of Urology  10/26/2021  9:38 AM

## 2021-10-26 NOTE — PLAN OF CARE
Problem: PAIN - ADULT  Goal: Verbalizes/displays adequate comfort level or patient's stated pain goal  Description: INTERVENTIONS:  - Encourage pt to monitor pain and request assistance  - Assess pain using appropriate pain scale  - Administer analgesics in hat . SCDS. Eliquis on hold. US of kidneys in AM, pt to be NPO after NOC. Safety precautions are in place.  Will continue to monitor

## 2021-10-27 ENCOUNTER — APPOINTMENT (OUTPATIENT)
Dept: ULTRASOUND IMAGING | Facility: HOSPITAL | Age: 36
DRG: 682 | End: 2021-10-27
Attending: INTERNAL MEDICINE
Payer: COMMERCIAL

## 2021-10-27 ENCOUNTER — APPOINTMENT (OUTPATIENT)
Dept: CT IMAGING | Facility: HOSPITAL | Age: 36
DRG: 682 | End: 2021-10-27
Attending: HOSPITALIST
Payer: COMMERCIAL

## 2021-10-27 PROCEDURE — 99232 SBSQ HOSP IP/OBS MODERATE 35: CPT | Performed by: INTERNAL MEDICINE

## 2021-10-27 PROCEDURE — 76775 US EXAM ABDO BACK WALL LIM: CPT | Performed by: INTERNAL MEDICINE

## 2021-10-27 PROCEDURE — 93975 VASCULAR STUDY: CPT | Performed by: INTERNAL MEDICINE

## 2021-10-27 PROCEDURE — 99233 SBSQ HOSP IP/OBS HIGH 50: CPT | Performed by: INTERNAL MEDICINE

## 2021-10-27 PROCEDURE — 90792 PSYCH DIAG EVAL W/MED SRVCS: CPT | Performed by: OTHER

## 2021-10-27 PROCEDURE — 70450 CT HEAD/BRAIN W/O DYE: CPT | Performed by: HOSPITALIST

## 2021-10-27 RX ORDER — HALOPERIDOL 5 MG/ML
3 INJECTION INTRAMUSCULAR EVERY 6 HOURS PRN
Status: DISCONTINUED | OUTPATIENT
Start: 2021-10-27 | End: 2021-10-28

## 2021-10-27 RX ORDER — HALOPERIDOL 5 MG/ML
2 INJECTION INTRAMUSCULAR 2 TIMES DAILY PRN
Status: DISCONTINUED | OUTPATIENT
Start: 2021-10-27 | End: 2021-10-27

## 2021-10-27 RX ORDER — HYDROMORPHONE HYDROCHLORIDE 1 MG/ML
INJECTION, SOLUTION INTRAMUSCULAR; INTRAVENOUS; SUBCUTANEOUS AS NEEDED
Status: DISCONTINUED | OUTPATIENT
Start: 2021-10-25 | End: 2021-10-27 | Stop reason: SURG

## 2021-10-27 RX ORDER — TRAZODONE HYDROCHLORIDE 50 MG/1
50 TABLET ORAL NIGHTLY PRN
Status: DISCONTINUED | OUTPATIENT
Start: 2021-10-27 | End: 2021-10-28

## 2021-10-27 RX ORDER — CLONAZEPAM 0.5 MG/1
0.25 TABLET ORAL 2 TIMES DAILY PRN
Status: DISCONTINUED | OUTPATIENT
Start: 2021-10-27 | End: 2021-10-28

## 2021-10-27 NOTE — CONSULTS
[facility]  Report of Psychiatric Consultation    [address] Patient Status:  Inpatient    10/15/1985 MRN [id number]   [facility] 5NW-A Attending Светлана Smith, DO   Hosp Day # 4 PCP Alisha Ruggiero, DO     Date of A improved with IVF's +/- removal of that clot. Per past psych notes, she has traits of borderline personality disorder.  She has a hx of affective instability, fear of abandonment, self injury/cutting, and hx of para-suicidal behavior ie overdosing on med ROTATOR CUFF,CHRONIC      left   • SPINAL CORD NEUROSTIMULATOR       Family History   Problem Relation Age of Onset   • Heart Disorder Father 39        CAD s/p bypass and stent   • Hypertension Father    • Other (Other) Mother         diverticulitis   • Ca Status Exam:     Objective       10/27/21  0403   BP: 116/51   Pulse: 60   Resp: 18   Temp: 97.8 °F (36.6 °C)     Appearance: fair grooming  Behavior: agitated  Attitude: uncooperative  Gait: Normal    Speech: normal rate, rhythm and volume    Mood: angry

## 2021-10-27 NOTE — PROGRESS NOTES
[facility]  Urology Progress Note    Whit Magaña Patient Status:  Inpatient    10/15/1985 MRN [id number]   [facility] 5NW-A Attending Chacha Kumar DO   Hosp Day # 4 PCP Anjana Andrea DO     Subjective:  Rylee Thomas

## 2021-10-27 NOTE — PROGRESS NOTES
[facility]     Nephrology Progress Note    Vargas Patel Patient Status:  Inpatient    10/15/1985 MRN [id number]   [facility] 5NW-A Attending Donna Boland DO   Hosp Day # 4 PCP Van Marley DO       SUBJECTIVE:  F 9.6 8.3* 8.0* 8.1* 8.4*   MG  --   --   --   --  1.8   * 97 77 97 121*       Recent Labs   Lab 10/23/21  1512 10/24/21  0659   ALT 17 16   AST 16 9*   ALB 3.4 2.6*       No results for input(s): PGLU in the last 168 hours.     Meds:   HYDROmorphone H was unrevealing in this regard.     - appreciate urology eval - s/p cystoscopy with noted bilateral uretral bleeding  - ultrasound with doppler unremarkable     3.  Chronic pain- s/p stimulator placement.  Pain mgmt per primary team     4.  Hx PE- on eliqui

## 2021-10-27 NOTE — PLAN OF CARE
Significant Event - Fall Note    Date/Time of Fall: October/26 at 18 and October 27 at 0700 2021    Fall huddle completed: Yes    Description of patient fall: at 2300 Pt was found on the floor of her room facing the door face down by the charge Rn.  Pt to none     Medications received in the past 8 hours:   Medication(s) Administered in past 8 Hours from 10/27/2021 0215 to 10/27/2021 1015     Dilaudid 2023 0.5 mg topamax 50g and trazodone 50mg           Was patient identified as high fall risk prior to fall

## 2021-10-27 NOTE — PLAN OF CARE
PROBLEM: Hematuria, intractable flank/abdominal pain, RAVI    ASSESSMENT: Pt is A&Ox4, lethargic, sleeping all shift. Per hospitalist, needed to sternal rub pt to get verbal response this AM. VSS, afebrile.  Pt continuously removes tele leads and pulse ox - hemorrhage  - Monitor labs and vital signs for trends  - Administer supportive blood products/factors, fluids and medications as ordered and appropriate  - Administer supportive blood products/factors as ordered and appropriate  Outcome: Progressing

## 2021-10-27 NOTE — PROGRESS NOTES
This RN was notified that patient was getting dressed to go home. Pt stated, \"You aren't doing anything for me anyway. You aren't giving me my pain medicine or my antibiotics so I might as well just go. \" Explained to patient that she was not given narcot eventually assisted into wheelchair and taken back to her room. Bilateral soft wrist restraints and posey vest applied for pt safety.  She removed wrist restraints multiple times and attempted to remove elaine vest. Dr. Mana Tavarez gave orders for Haldol 3mg IM x1

## 2021-10-28 ENCOUNTER — APPOINTMENT (OUTPATIENT)
Dept: GENERAL RADIOLOGY | Facility: HOSPITAL | Age: 36
DRG: 682 | End: 2021-10-28
Attending: NURSE PRACTITIONER
Payer: COMMERCIAL

## 2021-10-28 ENCOUNTER — HOSPITAL ENCOUNTER (EMERGENCY)
Facility: HOSPITAL | Age: 36
Discharge: HOME | End: 2021-10-28
Attending: EMERGENCY MEDICINE
Payer: COMMERCIAL

## 2021-10-28 VITALS
TEMPERATURE: 98 F | HEIGHT: 67 IN | HEART RATE: 68 BPM | WEIGHT: 216.25 LBS | DIASTOLIC BLOOD PRESSURE: 88 MMHG | RESPIRATION RATE: 18 BRPM | OXYGEN SATURATION: 99 % | BODY MASS INDEX: 33.94 KG/M2 | SYSTOLIC BLOOD PRESSURE: 127 MMHG

## 2021-10-28 VITALS
SYSTOLIC BLOOD PRESSURE: 119 MMHG | TEMPERATURE: 99 F | BODY MASS INDEX: 33.94 KG/M2 | DIASTOLIC BLOOD PRESSURE: 63 MMHG | WEIGHT: 216.25 LBS | RESPIRATION RATE: 16 BRPM | OXYGEN SATURATION: 99 % | HEART RATE: 52 BPM | HEIGHT: 67 IN

## 2021-10-28 DIAGNOSIS — R10.9 CHRONIC ABDOMINAL PAIN: Primary | ICD-10-CM

## 2021-10-28 DIAGNOSIS — D64.9 CHRONIC ANEMIA: ICD-10-CM

## 2021-10-28 DIAGNOSIS — R45.851 SUICIDAL IDEATION: ICD-10-CM

## 2021-10-28 DIAGNOSIS — G89.29 CHRONIC ABDOMINAL PAIN: Primary | ICD-10-CM

## 2021-10-28 DIAGNOSIS — R31.0 GROSS HEMATURIA: ICD-10-CM

## 2021-10-28 PROCEDURE — 99284 EMERGENCY DEPT VISIT MOD MDM: CPT

## 2021-10-28 PROCEDURE — 73130 X-RAY EXAM OF HAND: CPT | Performed by: NURSE PRACTITIONER

## 2021-10-28 PROCEDURE — 0241U SARS-COV-2/FLU A AND B/RSV BY PCR (GENEXPERT): CPT | Performed by: EMERGENCY MEDICINE

## 2021-10-28 PROCEDURE — 83690 ASSAY OF LIPASE: CPT | Performed by: EMERGENCY MEDICINE

## 2021-10-28 PROCEDURE — 99232 SBSQ HOSP IP/OBS MODERATE 35: CPT | Performed by: OTHER

## 2021-10-28 PROCEDURE — 85025 COMPLETE CBC W/AUTO DIFF WBC: CPT | Performed by: EMERGENCY MEDICINE

## 2021-10-28 PROCEDURE — 99285 EMERGENCY DEPT VISIT HI MDM: CPT

## 2021-10-28 PROCEDURE — 80307 DRUG TEST PRSMV CHEM ANLYZR: CPT | Performed by: EMERGENCY MEDICINE

## 2021-10-28 PROCEDURE — 81001 URINALYSIS AUTO W/SCOPE: CPT | Performed by: EMERGENCY MEDICINE

## 2021-10-28 PROCEDURE — 73110 X-RAY EXAM OF WRIST: CPT | Performed by: NURSE PRACTITIONER

## 2021-10-28 PROCEDURE — 36415 COLL VENOUS BLD VENIPUNCTURE: CPT

## 2021-10-28 PROCEDURE — 99239 HOSP IP/OBS DSCHRG MGMT >30: CPT | Performed by: INTERNAL MEDICINE

## 2021-10-28 PROCEDURE — 99233 SBSQ HOSP IP/OBS HIGH 50: CPT | Performed by: INTERNAL MEDICINE

## 2021-10-28 RX ORDER — DICYCLOMINE HYDROCHLORIDE 10 MG/ML
20 INJECTION INTRAMUSCULAR ONCE
Status: DISCONTINUED | OUTPATIENT
Start: 2021-10-28 | End: 2021-10-29

## 2021-10-28 RX ORDER — DICYCLOMINE HCL 20 MG
20 TABLET ORAL 4 TIMES DAILY PRN
Qty: 30 TABLET | Refills: 0 | Status: SHIPPED | OUTPATIENT
Start: 2021-10-28 | End: 2021-11-27

## 2021-10-28 RX ORDER — NAPROXEN 500 MG/1
500 TABLET ORAL ONCE
Status: DISCONTINUED | OUTPATIENT
Start: 2021-10-28 | End: 2021-10-28

## 2021-10-28 NOTE — PROGRESS NOTES
Patient sleeping this AM. Sitter, restraints, and suicide precautions discontinued by psych MD. Belongings returned to patient. Patient with another episode of hematuria today-see flowsheets. Patient refusing blood draws and x-rays today.  Nursing director

## 2021-10-28 NOTE — PROGRESS NOTES
[facility]     Nephrology Progress Note    Milagros Lind Patient Status:  Inpatient    10/15/1985 MRN [id number]   [facility] 5NW-A Attending Lulu Gonsales DO   Hosp Day # 5 PCP Quintin Ortiz DO       SUBJECTIVE:  E CREATSERUM 2.25* 2.16* 1.49* 1.24* 1.18*   CA 9.6 8.3* 8.0* 8.1* 8.4*   MG  --   --   --   --  1.8   * 97 77 97 121*       Recent Labs   Lab 10/23/21  1512 10/24/21  0659   ALT 17 16   AST 16 9*   ALB 3.4 2.6*       No results for input(s): PGLU i stimulator placement.  Pain mgmt per primary team     4. Hx PE-Eliquis resumed        Questions/concerns were discussed with patient and/or family by bedside.           Juanito Vigil MD  10/28/2021  8:42 AM

## 2021-10-28 NOTE — PLAN OF CARE
PSYCH ADDENDUM:    I HIGHLY SUSPECT a personality disorder like borderline personality d/o. She is very demanding YET help rejecting. Staff and treatment teams have gone above and beyond to give her excellent care.  She criticizes and puts staff down and st

## 2021-10-28 NOTE — DISCHARGE PLANNING
NURSING DISCHARGE NOTE    Discharged via ambulatory escorted by RN, , attending physician, & unit director.  Patient refused all discharge paperwork and instructions - this RN instructed patient to discontinue eliquis s/p hematuria per phys

## 2021-10-28 NOTE — PROGRESS NOTES
[facility]     Hospitalist Progress Note     Vargas Patel Patient Status:  Inpatient    10/15/1985 MRN [id number]   [facility] 5NW-A Attending Donna Boland, [address] [facility] Day # 5 PCP Van Marley DO     Chief Complain positive and negatives stated in subjective.     Vital signs:  Temp:  [98.6 °F (37 °C)-98.7 °F (37.1 °C)] 98.6 °F (37 °C)  Pulse:  [52-57] 52  Resp:  [16-18] 16  BP: (116-119)/(63-70) 119/63    Physical Exam:    General: agitated, verbally abusive  Migueloostadam 167 in the last 168 hours. Cardiac  No results for input(s): TROP, PBNP in the last 168 hours. Creatinine Kinase  No results for input(s): CK in the last 168 hours.     Inflammatory Markers  No results for input(s): CRP, JEAN PIERRE, LDH, DDIMER in the last 168 h

## 2021-10-28 NOTE — ED INITIAL ASSESSMENT (HPI)
Pt was admitted to the hospital on 10/13 for acute kidney injury and hematuria.   According to the nurse that took care of patient on the floor, throughout visit, pt was very demanding, condescending to staff, had multiple different complaints, but when add

## 2021-10-28 NOTE — PROGRESS NOTES
[facility]  Urology Progress Note    [facility] Patient Status:  Inpatient    10/15/1985 MRN [id number]   [facility] 5NW-A Attending Helio Crain DO   Hosp Day # 5 PCP Alec Rodriguez DO     Subjective:  Charley Kuhn

## 2021-10-28 NOTE — PLAN OF CARE
Assumed care of pt at 1. Pt c/o severe left flank and low abdominal pain, stating \"that's why I don't eat\". Pt ate everything on her tray.  Pt give 0.5 mg of Dilaudid for her pain after she had ambulated from the bathroom independently with steady gait did not remember falling. She asked for pain meds and was upset that she could not have any and stated \"if I can't have anything bring me the AMA papers then so I can fucking leave! \".  Pt was upset that this Rn had to steady her while walking yelling, \"

## 2021-10-28 NOTE — PLAN OF CARE
Pt aox4 but drowsy. In bed sleeping since beginning of shift. VSS on RA. Refusing  and Tele. Eliquis on hold. Bilateral soft wrist restraints and posey in place, 4 side rails raised per MD orders. Q2 hr restraint checks, see flowsheets.  Noted pt removed appropriate  - Instruct patient on fluid and nutrition restrictions as appropriate  Outcome: Progressing     Problem: HEMATOLOGIC - ADULT  Goal: Maintains hematologic stability  Description: INTERVENTIONS  - Assess for signs and symptoms of bleeding or hem

## 2021-10-28 NOTE — PROGRESS NOTES
[facility]  Report of Psychiatric Progress Note    Grace Levine Patient Status:  Inpatient    10/15/1985 MRN [id number]   [facility] 5NW-A Attending Anjali Lane, DO   Hosp Day # 5 PCP Johny Richardson DO     Date of Read MD Yuridia    History of Present Illness:  44 yo WF with hx depression and chronic back pain was admitted on 10/23/21 for eval of her gross hematuria and management of her RAVI.  Eval by urology and had a cystoscopy that showed a clot in the distal History: Alcohol use disorder    Social and Developmental History: Supportive sister.      Past Medical History:   Diagnosis Date   • Asthma    • Back pain    • DJD (degenerative joint disease)     4 bulging disk   • Gastroesophageal reflux disease without Intramuscular, Q6H PRN **OR** haloperidol lactate (HALDOL) 5 MG/ML injection 3 mg, 3 mg, Intravenous, Q6H PRN  •  lactated ringers infusion, , Intravenous, Continuous  •  influenza vaccine split quad (FLULAVAL) ages 6 months to 64 years inj 0.5ml, 0.5 mL,

## 2021-10-28 NOTE — PROGRESS NOTES
[facility]     Hospitalist Progress Note     Chelo Mar Patient Status:  Inpatient    10/15/1985 MRN [id number]   [facility] [place] 5NW-A Attending Mariah Henson, [address] [facility] Day # 5 PCP Julia Gibbs DO     Chief Complain 10/24/21  0659 10/24/21  0659 10/25/21  1108 10/26/21  0745 10/27/21  0735   *   < > 97   < > 77 97 121*   BUN 23*   < > 20*   < > 13 10 10   CREATSERUM 2.25*   < > 2.16*   < > 1.49* 1.24* 1.18*   GFRAA 31*   < > 33*   < > 51* 64 68   GFRNAA 27*   < time of discharge   - suicide precautions   - psych following  #: Hx of PE   - okay to resume Eliquis  #: chronic migraine HA   - topamax  #: Anxiety   - klonopin removed from room   - okay for klonopin daily PRN  #: Anemia of chronic disease   - Hgb at ba

## 2021-10-29 ENCOUNTER — PATIENT OUTREACH (OUTPATIENT)
Dept: CASE MANAGEMENT | Age: 36
End: 2021-10-29

## 2021-10-29 DIAGNOSIS — Z02.9 ENCOUNTERS FOR ADMINISTRATIVE PURPOSE: ICD-10-CM

## 2021-10-29 NOTE — ED QUICK NOTES
Patient's sister here to take patient home per [facility] approval.   Patient attempted to take urine cup. Informed patient that she may not take cup home bc there is a needle in the lid.  Patient very argumentative, informed patient that I will call security if s
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
## 2021-10-29 NOTE — PROGRESS NOTES
10/28/21 2120   COVID Exposure Risk Screening   Have you been practicing social distancing? No   Describe Efren Caldera states she doesn't social distance and states she had the Covid vaccine   Have you been wearing a mask when in the community?  Yes   Are the

## 2021-10-29 NOTE — ED QUICK NOTES
Pt now awake, eating dinner tray. Remains calm and cooperative. 1:1 sitter at bedside for constant observation.

## 2021-10-29 NOTE — DISCHARGE SUMMARY
[facility] HOSPITALIST  DISCHARGE SUMMARY     Tawnya Scott Patient Status:  Inpatient    10/15/1985 MRN [id number]   [facility] 5NW-A Attending No att. providers found   Hosp Day # 5 PCP Miriam Garcia DO     Date of Admission: 10/2 baseline. Her hematuria and RAVI improved and her  Eliquis was restarted. She was treated with empiric abx for possible UTI, but UA/culture were not c/w infection and empiric abx were discontinued.  Her hospital admission was complicated by multiple BERTs wi CONTINUE taking these medications      Instructions Prescription details   acetaminophen 500 MG Tabs  Commonly known as: TYLENOL EXTRA STRENGTH      Take 2,000 mg by mouth daily as needed for Pain.    Refills: 0     albuterol 108 (90 Base) MCG/ACT Aers tablet  Refills: 3     Ubrelvy 100 MG Tabs  Generic drug: ubrogepant      Take 100 mg by mout may repeat once 2 hours later in same day max 2 per 24 hours.    Quantity: 10 tablet  Refills: 2        STOP taking these medications    apixaban 5 MG Tabs  Common

## 2021-10-29 NOTE — ED QUICK NOTES
Patient appears to be holding her phone and playing with it. Explained to patient that phones need be placed with other belongings. Patient stated that she wanted to keep her phone but eventually turned cell phone in and placed with personal belongings.

## 2021-11-01 ENCOUNTER — TELEPHONE (OUTPATIENT)
Dept: FAMILY MEDICINE CLINIC | Facility: CLINIC | Age: 36
End: 2021-11-01

## 2021-11-01 NOTE — PROGRESS NOTES
Initial Post Discharge Follow Up   Discharge Date: 10/28/21  Contact Date: 10/29/2021    Consent Verification:  Assessment Completed With: Patient  HIPAA Verified?   Yes    Discharge Dx:   Acute renal failure, unspecified acute renal failure type      Wa daily with meals. 180 tablet 2   • topiramate (TOPAMAX) 50 MG Oral Tab Take 1 tablet (50 mg total) by mouth 2 (two) times daily.  Take in am and 4 pm 180 tablet 3   • ubrogepant (UBRELVY) 100 MG Oral Tab Take 100 mg by mout may repeat once 2 hours later in you to make sure we are not missing anything? yes  • Are there any reasons that keep you from taking your medication as prescribed?  Pt states that she is doing at least a 2 week cleanse where she is not taking any medications, to get everything out of her s directly. If you are having issues or need to use a desktop/laptop, please follow the below steps:        1. Close out all other open apps (could be competing for audio resources)  2.        Disable Bluetooth  3.       Reboot mobile device before j stated that she will not take any medications. Med review completed. She denies having any fever. Shortness of breath is normal for her as she states that she has \"post covid lung\".  She states that she will not go back to the ER as these symptoms have be

## 2021-11-01 NOTE — TELEPHONE ENCOUNTER
S/w patient for TCM. She states that she continues to have abdominal pain (6/10). She continues to have nausea and vomits every time she eats.  She has not had a bm since hospitalization but also states that she has not \"ate in [employer]" She also continues

## 2021-11-08 NOTE — PROGRESS NOTES
HPI:    Susi Briseno is a 43year old female here today for ER follow up.     Lower abd pain - pt has been chronically constipated   Pt will go 2 weeks without a BM   Pt taking OTC pain   No h/o constipation       Pt reports she has chronic R daily. (Patient not taking: Reported on 11/8/2021)  Polyethylene Glycol 3350 17 g Oral Powd Pack, Take 17 g by mouth daily.  (Patient not taking: No sig reported)  Albuterol Sulfate HFA (PROAIR HFA) 108 (90 Base) MCG/ACT Inhalation Aero Soln, Inhale 2 puffs repair rotator cuff,acute; and spinal cord neurostimulator. She family history includes Alcohol and Other Disorders Associated in her maternal uncle;  Cancer in her maternal aunt, maternal grandfather, and paternal grandmother; Heart Disorder (age of ons or tenderness  MUSCULOSKELETAL: back is not tender, FROM of the extremities  EXTREMITIES: no cyanosis, clubbing or edema  NEURO: Oriented times three, cranial nerves are intact, motor and sensory are grossly intact  PSYCH: pt appears anxious     Left wrist

## 2021-11-10 ENCOUNTER — OFFICE VISIT (OUTPATIENT)
Dept: FAMILY MEDICINE CLINIC | Facility: CLINIC | Age: 36
End: 2021-11-10

## 2021-11-10 ENCOUNTER — LAB ENCOUNTER (OUTPATIENT)
Dept: LAB | Age: 36
End: 2021-11-10
Attending: FAMILY MEDICINE
Payer: COMMERCIAL

## 2021-11-10 VITALS
RESPIRATION RATE: 16 BRPM | HEART RATE: 80 BPM | SYSTOLIC BLOOD PRESSURE: 122 MMHG | WEIGHT: 214 LBS | HEIGHT: 67 IN | OXYGEN SATURATION: 99 % | BODY MASS INDEX: 33.59 KG/M2 | DIASTOLIC BLOOD PRESSURE: 74 MMHG

## 2021-11-10 DIAGNOSIS — F41.9 ANXIETY AND DEPRESSION: ICD-10-CM

## 2021-11-10 DIAGNOSIS — Z51.89 VISIT FOR WOUND CHECK: ICD-10-CM

## 2021-11-10 DIAGNOSIS — K21.9 GASTROESOPHAGEAL REFLUX DISEASE WITHOUT ESOPHAGITIS: ICD-10-CM

## 2021-11-10 DIAGNOSIS — D50.8 OTHER IRON DEFICIENCY ANEMIA: Primary | ICD-10-CM

## 2021-11-10 DIAGNOSIS — Z23 NEED FOR VACCINATION: ICD-10-CM

## 2021-11-10 DIAGNOSIS — F32.A ANXIETY AND DEPRESSION: ICD-10-CM

## 2021-11-10 DIAGNOSIS — Z87.01 HISTORY OF PNEUMONIA: ICD-10-CM

## 2021-11-10 DIAGNOSIS — D50.8 OTHER IRON DEFICIENCY ANEMIA: ICD-10-CM

## 2021-11-10 PROCEDURE — 90472 IMMUNIZATION ADMIN EACH ADD: CPT | Performed by: FAMILY MEDICINE

## 2021-11-10 PROCEDURE — 99214 OFFICE O/P EST MOD 30 MIN: CPT | Performed by: FAMILY MEDICINE

## 2021-11-10 PROCEDURE — 3074F SYST BP LT 130 MM HG: CPT | Performed by: FAMILY MEDICINE

## 2021-11-10 PROCEDURE — 3008F BODY MASS INDEX DOCD: CPT | Performed by: FAMILY MEDICINE

## 2021-11-10 PROCEDURE — 36415 COLL VENOUS BLD VENIPUNCTURE: CPT

## 2021-11-10 PROCEDURE — 90732 PPSV23 VACC 2 YRS+ SUBQ/IM: CPT | Performed by: FAMILY MEDICINE

## 2021-11-10 PROCEDURE — 90686 IIV4 VACC NO PRSV 0.5 ML IM: CPT | Performed by: FAMILY MEDICINE

## 2021-11-10 PROCEDURE — 3078F DIAST BP <80 MM HG: CPT | Performed by: FAMILY MEDICINE

## 2021-11-10 PROCEDURE — 85025 COMPLETE CBC W/AUTO DIFF WBC: CPT

## 2021-11-10 PROCEDURE — 82728 ASSAY OF FERRITIN: CPT

## 2021-11-10 PROCEDURE — 90471 IMMUNIZATION ADMIN: CPT | Performed by: FAMILY MEDICINE

## 2021-11-10 NOTE — PROGRESS NOTES
HPI:    Josiah Dow is a 43year old female here today for follow up.     Lower abd pain - pt has been chronically constipated   Feeling better overall on the PPI   Will be seeing gi soon   Keeping food and liquids down but now worried about Powd Pack, Take 17 g by mouth daily. Albuterol Sulfate HFA (PROAIR HFA) 108 (90 Base) MCG/ACT Inhalation Aero Soln, Inhale 2 puffs into the lungs every 6 (six) hours as needed for Wheezing.   albuterol sulfate (2.5 MG/3ML) 0.083% Inhalation Nebu Soln, Take failure, Pulmonary embol[facility] ([facility] (03/2021), Reactive airway disease, Shortness of breath, Sleep disturbance, Sputum production, Stress, Uncomfortable fullness after meals, Vomiting, Wears glasses, Weight loss, and Wheezing.     She  has a past surgical his (1.702 m)   Wt 214 lb (97.1 kg)   LMP 08/16/2021 (Approximate)   SpO2 99%   BMI 33.52 kg/m²    GENERAL: well developed, well nourished, sob on exam but then had time periods of normal breathing   SKIN: no rashes, no suspicious lesions  HEENT: atraumatic, n

## 2021-11-11 DIAGNOSIS — D64.9 LOW HEMOGLOBIN: ICD-10-CM

## 2021-11-11 DIAGNOSIS — R79.89 ABNORMAL CBC: Primary | ICD-10-CM

## 2021-11-16 ENCOUNTER — TELEPHONE (OUTPATIENT)
Dept: FAMILY MEDICINE CLINIC | Facility: CLINIC | Age: 36
End: 2021-11-16

## 2021-11-16 RX ORDER — NORETHINDRONE ACETATE/ETHINYL ESTRADIOL AND FERROUS FUMARATE 1MG-20(21)
KIT ORAL
Qty: 84 TABLET | Refills: 0 | Status: SHIPPED | OUTPATIENT
Start: 2021-11-16

## 2021-11-16 NOTE — TELEPHONE ENCOUNTER
Patient dropped off paperwork to be filled out by the office. Patient stated she just had an appointment. She stated to call her when ready today as she will be back to pick it up.   I asked her if she told someone she was bringing in this paperwork, as p

## 2021-11-16 NOTE — TELEPHONE ENCOUNTER
Per SASCHA, pt needs [employer] to discuss. We are not sure what pt wants FMLA for. I spoke with pt. She said she just needs this paperwork for the 4 days she was inpatient @ [facility] (10/24 - 10/28) for gross hematuria.      This is the paperwork she needs

## 2021-11-17 NOTE — TELEPHONE ENCOUNTER
Patient calling stating that she needs [facility] paperwork to be picked up today per her employer. Please advise.

## 2021-11-22 NOTE — PROGRESS NOTES
HPI:    Alessia Daniels is a 43year old female here today for follow up     Lower abd pain - pt has been chronically constipated   Pt will go 2 weeks without a BM   S/p gi evaluation   Pt has been able to keep down food and liquids down     Pt mouth every 8 (eight) hours as needed for Nausea. FLUoxetine 20 MG Oral Cap, Take 60 mg by mouth daily. Polyethylene Glycol 3350 17 g Oral Powd Pack, Take 17 g by mouth daily.   Albuterol Sulfate HFA (PROAIR HFA) 108 (90 Base) MCG/ACT Inhalation Aero Soln fullness after meals, Vomiting, Wears glasses, Weight loss, and Wheezing.     She  has a past surgical history that includes cholecystectomy; other surgical history; repair rotator cuff,chronic; repair rotator cuff,acute; spinal cord neurostimulator; back s then had time periods of normal breathing   SKIN: no rashes, no suspicious lesions  HEENT: atraumatic, normocephalic, ears and throat are clear  EYES: PERRLA, EOMI, conjunctiva are clear  NECK: supple, no adenopathy, no bruits  CHEST: no chest tenderness

## 2021-11-24 RX ORDER — APIXABAN 5 MG/1
TABLET, FILM COATED ORAL
Qty: 60 TABLET | Refills: 3 | Status: SHIPPED | OUTPATIENT
Start: 2021-11-24 | End: 2021-12-20

## 2021-12-18 PROBLEM — R09.02 HYPOXIA: Status: RESOLVED | Noted: 2021-03-09 | Resolved: 2021-12-18

## 2021-12-18 PROBLEM — J18.9 COMMUNITY ACQUIRED PNEUMONIA, UNSPECIFIED LATERALITY: Status: RESOLVED | Noted: 2021-03-09 | Resolved: 2021-12-18

## 2021-12-18 PROBLEM — E87.2 METABOLIC ACIDOSIS: Status: RESOLVED | Noted: 2021-10-23 | Resolved: 2021-12-18

## 2021-12-18 PROBLEM — R06.02 SOB (SHORTNESS OF BREATH): Status: RESOLVED | Noted: 2021-03-09 | Resolved: 2021-12-18

## 2021-12-18 PROBLEM — F33.2 SEVERE EPISODE OF RECURRENT MAJOR DEPRESSIVE DISORDER, WITHOUT PSYCHOTIC FEATURES (HCC): Status: RESOLVED | Noted: 2019-06-08 | Resolved: 2021-12-18

## 2021-12-18 PROBLEM — N17.9 ACUTE RENAL FAILURE (ARF) (HCC): Status: RESOLVED | Noted: 2021-10-23 | Resolved: 2021-12-18

## 2021-12-18 PROBLEM — E87.20 METABOLIC ACIDOSIS: Status: RESOLVED | Noted: 2021-10-23 | Resolved: 2021-12-18

## 2021-12-20 RX ORDER — ALBUTEROL SULFATE 90 UG/1
2 AEROSOL, METERED RESPIRATORY (INHALATION) EVERY 6 HOURS PRN
Qty: 3 EACH | Refills: 0 | Status: SHIPPED | OUTPATIENT
Start: 2021-12-20

## 2021-12-20 RX ORDER — ALBUTEROL SULFATE 2.5 MG/3ML
2.5 SOLUTION RESPIRATORY (INHALATION) EVERY 4 HOURS PRN
Qty: 15 EACH | Refills: 2 | Status: SHIPPED | OUTPATIENT
Start: 2021-12-20

## 2022-01-12 ENCOUNTER — TELEPHONE (OUTPATIENT)
Dept: FAMILY MEDICINE CLINIC | Facility: CLINIC | Age: 37
End: 2022-01-12

## 2022-01-12 ENCOUNTER — OFFICE VISIT (OUTPATIENT)
Dept: FAMILY MEDICINE CLINIC | Facility: CLINIC | Age: 37
End: 2022-01-12
Payer: COMMERCIAL

## 2022-01-12 VITALS
RESPIRATION RATE: 16 BRPM | SYSTOLIC BLOOD PRESSURE: 116 MMHG | DIASTOLIC BLOOD PRESSURE: 74 MMHG | HEIGHT: 67 IN | HEART RATE: 92 BPM | OXYGEN SATURATION: 100 % | WEIGHT: 210 LBS | BODY MASS INDEX: 32.96 KG/M2

## 2022-01-12 DIAGNOSIS — S41.112A LACERATION OF LEFT UPPER EXTREMITY, INITIAL ENCOUNTER: Primary | ICD-10-CM

## 2022-01-12 DIAGNOSIS — F41.9 ANXIETY AND DEPRESSION: ICD-10-CM

## 2022-01-12 DIAGNOSIS — F32.A ANXIETY AND DEPRESSION: ICD-10-CM

## 2022-01-12 PROCEDURE — 99214 OFFICE O/P EST MOD 30 MIN: CPT | Performed by: FAMILY MEDICINE

## 2022-01-12 PROCEDURE — 12002 RPR S/N/AX/GEN/TRNK2.6-7.5CM: CPT | Performed by: FAMILY MEDICINE

## 2022-01-12 PROCEDURE — 3008F BODY MASS INDEX DOCD: CPT | Performed by: FAMILY MEDICINE

## 2022-01-12 PROCEDURE — 3074F SYST BP LT 130 MM HG: CPT | Performed by: FAMILY MEDICINE

## 2022-01-12 PROCEDURE — 3078F DIAST BP <80 MM HG: CPT | Performed by: FAMILY MEDICINE

## 2022-01-12 NOTE — TELEPHONE ENCOUNTER
Dr. Yvette Machado,  Just received phone call from patient. She had to put her bunny to sleep yesterday. Late last night cut her left wrist to feel something else besides the pain of loosing her bunny. Left wrist has a deep cut - approx.  3 inches long, is swollen

## 2022-01-13 ENCOUNTER — LAB ENCOUNTER (OUTPATIENT)
Dept: LAB | Age: 37
End: 2022-01-13
Attending: INTERNAL MEDICINE
Payer: COMMERCIAL

## 2022-01-13 ENCOUNTER — LAB ENCOUNTER (OUTPATIENT)
Dept: LAB | Age: 37
End: 2022-01-13
Attending: FAMILY MEDICINE
Payer: COMMERCIAL

## 2022-01-13 ENCOUNTER — OFFICE VISIT (OUTPATIENT)
Dept: FAMILY MEDICINE CLINIC | Facility: CLINIC | Age: 37
End: 2022-01-13
Payer: COMMERCIAL

## 2022-01-13 VITALS
BODY MASS INDEX: 32.96 KG/M2 | DIASTOLIC BLOOD PRESSURE: 72 MMHG | HEART RATE: 84 BPM | OXYGEN SATURATION: 98 % | WEIGHT: 210 LBS | SYSTOLIC BLOOD PRESSURE: 110 MMHG | HEIGHT: 67 IN | RESPIRATION RATE: 16 BRPM

## 2022-01-13 DIAGNOSIS — R79.89 ABNORMAL CBC: ICD-10-CM

## 2022-01-13 DIAGNOSIS — D50.9 IRON DEFICIENCY ANEMIA, UNSPECIFIED IRON DEFICIENCY ANEMIA TYPE: ICD-10-CM

## 2022-01-13 DIAGNOSIS — S41.112A LACERATION OF LEFT UPPER EXTREMITY, INITIAL ENCOUNTER: Primary | ICD-10-CM

## 2022-01-13 DIAGNOSIS — M79.645 PAIN OF LEFT THUMB: ICD-10-CM

## 2022-01-13 DIAGNOSIS — D64.9 LOW HEMOGLOBIN: ICD-10-CM

## 2022-01-13 DIAGNOSIS — E55.9 VITAMIN D DEFICIENCY: ICD-10-CM

## 2022-01-13 LAB
BASOPHILS # BLD AUTO: 0.02 X10(3) UL (ref 0–0.2)
BASOPHILS NFR BLD AUTO: 0.4 %
EOSINOPHIL # BLD AUTO: 0.18 X10(3) UL (ref 0–0.7)
EOSINOPHIL NFR BLD AUTO: 4 %
ERYTHROCYTE [DISTWIDTH] IN BLOOD BY AUTOMATED COUNT: 13.9 %
HCT VFR BLD AUTO: 33.7 %
HGB BLD-MCNC: 10.1 G/DL
IGA SERPL-MCNC: 294 MG/DL (ref 70–312)
IMM GRANULOCYTES # BLD AUTO: 0.01 X10(3) UL (ref 0–1)
IMM GRANULOCYTES NFR BLD: 0.2 %
IRON SATN MFR SERPL: 6 %
IRON SERPL-MCNC: 26 UG/DL
LYMPHOCYTES # BLD AUTO: 1.13 X10(3) UL (ref 1–4)
LYMPHOCYTES NFR BLD AUTO: 25.2 %
MCH RBC QN AUTO: 27.1 PG (ref 26–34)
MCHC RBC AUTO-ENTMCNC: 30 G/DL (ref 31–37)
MCV RBC AUTO: 90.3 FL
MONOCYTES # BLD AUTO: 0.35 X10(3) UL (ref 0.1–1)
MONOCYTES NFR BLD AUTO: 7.8 %
NEUTROPHILS # BLD AUTO: 2.79 X10 (3) UL (ref 1.5–7.7)
NEUTROPHILS # BLD AUTO: 2.79 X10(3) UL (ref 1.5–7.7)
NEUTROPHILS NFR BLD AUTO: 62.4 %
PLATELET # BLD AUTO: 283 10(3)UL (ref 150–450)
RBC # BLD AUTO: 3.73 X10(6)UL
TIBC SERPL-MCNC: 448 UG/DL (ref 240–450)
TRANSFERRIN SERPL-MCNC: 301 MG/DL (ref 200–360)
VIT D+METAB SERPL-MCNC: 38.6 NG/ML (ref 30–100)
WBC # BLD AUTO: 4.5 X10(3) UL (ref 4–11)

## 2022-01-13 PROCEDURE — 3078F DIAST BP <80 MM HG: CPT | Performed by: FAMILY MEDICINE

## 2022-01-13 PROCEDURE — 82784 ASSAY IGA/IGD/IGG/IGM EACH: CPT

## 2022-01-13 PROCEDURE — 86364 TISS TRNSGLTMNASE EA IG CLAS: CPT

## 2022-01-13 PROCEDURE — 99024 POSTOP FOLLOW-UP VISIT: CPT | Performed by: FAMILY MEDICINE

## 2022-01-13 PROCEDURE — 3074F SYST BP LT 130 MM HG: CPT | Performed by: FAMILY MEDICINE

## 2022-01-13 PROCEDURE — 36415 COLL VENOUS BLD VENIPUNCTURE: CPT

## 2022-01-13 PROCEDURE — 83540 ASSAY OF IRON: CPT

## 2022-01-13 PROCEDURE — 83550 IRON BINDING TEST: CPT

## 2022-01-13 PROCEDURE — 85025 COMPLETE CBC W/AUTO DIFF WBC: CPT

## 2022-01-13 PROCEDURE — 3008F BODY MASS INDEX DOCD: CPT | Performed by: FAMILY MEDICINE

## 2022-01-13 PROCEDURE — 82306 VITAMIN D 25 HYDROXY: CPT
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
## 2022-01-13 NOTE — PROGRESS NOTES
HPI:    Dionte Tarango is a 43year old female here today for laceration repair     Pt had to put bunny to sleep yesterday   Pt felt the urge to cut her left wrist at 1am   Pt denies any active SI or HI /pt just wanted to feel pain   Pt sees t daily. Take in am and 4 pm  ubrogepant (UBRELVY) 100 MG Oral Tab, Take 100 mg by mout may repeat once 2 hours later in same day max 2 per 24 hours.   ondansetron (ZOFRAN) 4 mg tablet, Take 1 tablet (4 mg total) by mouth every 8 (eight) hours as needed for N grafts, Primary ovarian failure, [facility] ([facility]), Reactive airway disease, Severe episode of recurrent major depressive disorder, without psychotic features ([facility] (6/8/2019), Shortness of breath, Sleep disturbance, SOB (shortness of marcello calculated from the following:    Height as of this encounter: 5' 7\" (1.702 m). Weight as of this encounter: 210 lb (95.3 kg).    /74   Pulse 92   Resp 16   Ht 5' 7\" (1.702 m)   Wt 210 lb (95.3 kg)   LMP 08/16/2021 (Approximate)   SpO2 100%   BMI

## 2022-01-14 LAB — TTG IGA SER-ACNC: 0.7 U/ML (ref ?–7)

## 2022-01-17 ENCOUNTER — TELEPHONE (OUTPATIENT)
Dept: ORTHOPEDICS CLINIC | Facility: CLINIC | Age: 37
End: 2022-01-17

## 2022-01-17 DIAGNOSIS — M79.642 LEFT HAND PAIN: ICD-10-CM

## 2022-01-17 DIAGNOSIS — M25.532 LEFT WRIST PAIN: Primary | ICD-10-CM

## 2022-01-17 NOTE — TELEPHONE ENCOUNTER
Requisite for xray order  Reviewed patients chart, xray orders are required. Order placed for LEFT WRIST AND HAND xrays    • Spoke to patient informed to arrive 30 mins prior to patients appt to complete x-ray order.  Patient verbalized understanding and ag

## 2022-01-17 NOTE — TELEPHONE ENCOUNTER
Patient scheduled appt on 2/1 for pain in the left wrist to the thumb.  Please advise if imaging is needed

## 2022-01-20 ENCOUNTER — OFFICE VISIT (OUTPATIENT)
Dept: FAMILY MEDICINE CLINIC | Facility: CLINIC | Age: 37
End: 2022-01-20
Payer: COMMERCIAL

## 2022-01-20 ENCOUNTER — TELEPHONE (OUTPATIENT)
Dept: FAMILY MEDICINE CLINIC | Facility: CLINIC | Age: 37
End: 2022-01-20

## 2022-01-20 VITALS
RESPIRATION RATE: 16 BRPM | SYSTOLIC BLOOD PRESSURE: 118 MMHG | BODY MASS INDEX: 32.96 KG/M2 | WEIGHT: 210 LBS | HEIGHT: 67 IN | DIASTOLIC BLOOD PRESSURE: 70 MMHG | HEART RATE: 92 BPM | OXYGEN SATURATION: 98 %

## 2022-01-20 DIAGNOSIS — G56.02 LEFT CARPAL TUNNEL SYNDROME: Primary | ICD-10-CM

## 2022-01-20 DIAGNOSIS — Z76.89 ENCOUNTER FOR WEIGHT MANAGEMENT: ICD-10-CM

## 2022-01-20 DIAGNOSIS — Z72.89 SELF MUTILATING BEHAVIOR: ICD-10-CM

## 2022-01-20 DIAGNOSIS — S41.112A LACERATION OF LEFT UPPER EXTREMITY, INITIAL ENCOUNTER: ICD-10-CM

## 2022-01-20 DIAGNOSIS — M79.645 PAIN OF LEFT THUMB: ICD-10-CM

## 2022-01-20 PROCEDURE — 3078F DIAST BP <80 MM HG: CPT | Performed by: FAMILY MEDICINE

## 2022-01-20 PROCEDURE — 3074F SYST BP LT 130 MM HG: CPT | Performed by: FAMILY MEDICINE

## 2022-01-20 PROCEDURE — 3008F BODY MASS INDEX DOCD: CPT | Performed by: FAMILY MEDICINE

## 2022-01-20 PROCEDURE — 99214 OFFICE O/P EST MOD 30 MIN: CPT | Performed by: FAMILY MEDICINE

## 2022-01-20 RX ORDER — PHENTERMINE HYDROCHLORIDE 37.5 MG/1
37.5 TABLET ORAL
Qty: 30 TABLET | Refills: 0 | Status: SHIPPED | OUTPATIENT
Start: 2022-01-20
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
## 2022-02-01 ENCOUNTER — HOSPITAL ENCOUNTER (OUTPATIENT)
Dept: GENERAL RADIOLOGY | Age: 37
Discharge: HOME | End: 2022-02-01
Attending: ORTHOPAEDIC SURGERY
Payer: COMMERCIAL

## 2022-02-01 ENCOUNTER — OFFICE VISIT (OUTPATIENT)
Dept: ORTHOPEDICS CLINIC | Facility: CLINIC | Age: 37
End: 2022-02-01
Payer: COMMERCIAL

## 2022-02-01 VITALS — HEIGHT: 67 IN | BODY MASS INDEX: 32.18 KG/M2 | WEIGHT: 205 LBS

## 2022-02-01 DIAGNOSIS — M65.4 TENOSYNOVITIS OF RADIAL STYLOID: Primary | ICD-10-CM

## 2022-02-01 DIAGNOSIS — G56.03 BILATERAL CARPAL TUNNEL SYNDROME: ICD-10-CM

## 2022-02-01 DIAGNOSIS — S64.10XA: ICD-10-CM

## 2022-02-01 DIAGNOSIS — M25.532 LEFT WRIST PAIN: ICD-10-CM

## 2022-02-01 DIAGNOSIS — M79.642 LEFT HAND PAIN: ICD-10-CM

## 2022-02-01 PROCEDURE — 99204 OFFICE O/P NEW MOD 45 MIN: CPT | Performed by: ORTHOPAEDIC SURGERY

## 2022-02-01 PROCEDURE — 3008F BODY MASS INDEX DOCD: CPT | Performed by: ORTHOPAEDIC SURGERY

## 2022-02-01 PROCEDURE — 20550 NJX 1 TENDON SHEATH/LIGAMENT: CPT | Performed by: ORTHOPAEDIC SURGERY

## 2022-02-01 PROCEDURE — 73130 X-RAY EXAM OF HAND: CPT | Performed by: ORTHOPAEDIC SURGERY

## 2022-02-01 PROCEDURE — 73110 X-RAY EXAM OF WRIST: CPT | Performed by: ORTHOPAEDIC SURGERY

## 2022-02-01 RX ORDER — BETAMETHASONE SODIUM PHOSPHATE AND BETAMETHASONE ACETATE 3; 3 MG/ML; MG/ML
6 INJECTION, SUSPENSION INTRA-ARTICULAR; INTRALESIONAL; INTRAMUSCULAR; SOFT TISSUE ONCE
Status: COMPLETED | OUTPATIENT
Start: 2022-02-01 | End: 2022-02-01

## 2022-02-01 RX ADMIN — BETAMETHASONE SODIUM PHOSPHATE AND BETAMETHASONE ACETATE 6 MG: 3; 3 INJECTION, SUSPENSION INTRA-ARTICULAR; INTRALESIONAL; INTRAMUSCULAR; SOFT TISSUE at 14:49:00

## 2022-02-15 PROBLEM — R73.9 HYPERGLYCEMIA: Status: RESOLVED | Noted: 2021-10-23 | Resolved: 2022-02-15

## 2022-02-16 ENCOUNTER — APPOINTMENT (OUTPATIENT)
Dept: CT IMAGING | Facility: HOSPITAL | Age: 37
End: 2022-02-16
Attending: EMERGENCY MEDICINE
Payer: COMMERCIAL

## 2022-02-16 ENCOUNTER — HOSPITAL ENCOUNTER (EMERGENCY)
Facility: HOSPITAL | Age: 37
Discharge: HOME | End: 2022-02-16
Attending: EMERGENCY MEDICINE
Payer: COMMERCIAL

## 2022-02-16 VITALS
OXYGEN SATURATION: 99 % | WEIGHT: 205 LBS | RESPIRATION RATE: 18 BRPM | HEIGHT: 67 IN | BODY MASS INDEX: 32.18 KG/M2 | DIASTOLIC BLOOD PRESSURE: 74 MMHG | SYSTOLIC BLOOD PRESSURE: 121 MMHG | HEART RATE: 81 BPM | TEMPERATURE: 97 F

## 2022-02-16 DIAGNOSIS — N30.01 ACUTE CYSTITIS WITH HEMATURIA: ICD-10-CM

## 2022-02-16 DIAGNOSIS — S09.90XA INJURY OF HEAD, INITIAL ENCOUNTER: Primary | ICD-10-CM

## 2022-02-16 LAB
ANION GAP SERPL CALC-SCNC: 10 MMOL/L (ref 0–18)
ANTIBODY SCREEN: NEGATIVE
B-HCG UR QL: NEGATIVE
BASOPHILS # BLD AUTO: 0.02 X10(3) UL (ref 0–0.2)
BASOPHILS NFR BLD AUTO: 0.3 %
BILIRUB UR QL: NEGATIVE
BUN BLD-MCNC: 14 MG/DL (ref 7–18)
BUN/CREAT SERPL: 12.2 (ref 10–20)
CALCIUM BLD-MCNC: 8.6 MG/DL (ref 8.5–10.1)
CHLORIDE SERPL-SCNC: 114 MMOL/L (ref 98–112)
CO2 SERPL-SCNC: 16 MMOL/L (ref 21–32)
CREAT BLD-MCNC: 1.15 MG/DL
DEPRECATED RDW RBC AUTO: 43.1 FL (ref 35.1–46.3)
EOSINOPHIL # BLD AUTO: 0.14 X10(3) UL (ref 0–0.7)
EOSINOPHIL NFR BLD AUTO: 2.2 %
ERYTHROCYTE [DISTWIDTH] IN BLOOD BY AUTOMATED COUNT: 14.2 % (ref 11–15)
GLUCOSE BLD-MCNC: 85 MG/DL (ref 70–99)
GLUCOSE UR-MCNC: NEGATIVE MG/DL
HCT VFR BLD AUTO: 27.8 %
HGB BLD-MCNC: 8.8 G/DL
IMM GRANULOCYTES # BLD AUTO: 0.01 X10(3) UL (ref 0–1)
IMM GRANULOCYTES NFR BLD: 0.2 %
KETONES UR-MCNC: NEGATIVE MG/DL
LACTATE SERPL-SCNC: 1.3 MMOL/L (ref 0.4–2)
LYMPHOCYTES # BLD AUTO: 1.65 X10(3) UL (ref 1–4)
LYMPHOCYTES NFR BLD AUTO: 25.6 %
MCH RBC QN AUTO: 26.6 PG (ref 26–34)
MCHC RBC AUTO-ENTMCNC: 31.7 G/DL (ref 31–37)
MCV RBC AUTO: 84 FL
MONOCYTES # BLD AUTO: 0.41 X10(3) UL (ref 0.1–1)
MONOCYTES NFR BLD AUTO: 6.4 %
NEUTROPHILS # BLD AUTO: 4.21 X10 (3) UL (ref 1.5–7.7)
NEUTROPHILS # BLD AUTO: 4.21 X10(3) UL (ref 1.5–7.7)
NEUTROPHILS NFR BLD AUTO: 65.3 %
NITRITE UR QL STRIP.AUTO: NEGATIVE
OSMOLALITY SERPL CALC.SUM OF ELEC: 290 MOSM/KG (ref 275–295)
PLATELET # BLD AUTO: 278 10(3)UL (ref 150–450)
POTASSIUM SERPL-SCNC: 3.4 MMOL/L (ref 3.5–5.1)
PROT UR-MCNC: 100 MG/DL
RBC # BLD AUTO: 3.31 X10(6)UL
RBC #/AREA URNS AUTO: >10 /HPF
RH BLOOD TYPE: POSITIVE
SODIUM SERPL-SCNC: 140 MMOL/L (ref 136–145)
SP GR UR STRIP: 1.01 (ref 1–1.03)
UROBILINOGEN UR STRIP-ACNC: <2
WBC # BLD AUTO: 6.4 X10(3) UL (ref 4–11)

## 2022-02-16 PROCEDURE — 83605 ASSAY OF LACTIC ACID: CPT | Performed by: EMERGENCY MEDICINE

## 2022-02-16 PROCEDURE — 86900 BLOOD TYPING SEROLOGIC ABO: CPT | Performed by: EMERGENCY MEDICINE

## 2022-02-16 PROCEDURE — 87086 URINE CULTURE/COLONY COUNT: CPT | Performed by: EMERGENCY MEDICINE

## 2022-02-16 PROCEDURE — 81001 URINALYSIS AUTO W/SCOPE: CPT | Performed by: EMERGENCY MEDICINE

## 2022-02-16 PROCEDURE — 86850 RBC ANTIBODY SCREEN: CPT | Performed by: EMERGENCY MEDICINE

## 2022-02-16 PROCEDURE — 81025 URINE PREGNANCY TEST: CPT

## 2022-02-16 PROCEDURE — 85025 COMPLETE CBC W/AUTO DIFF WBC: CPT | Performed by: EMERGENCY MEDICINE

## 2022-02-16 PROCEDURE — 96361 HYDRATE IV INFUSION ADD-ON: CPT

## 2022-02-16 PROCEDURE — 70450 CT HEAD/BRAIN W/O DYE: CPT | Performed by: EMERGENCY MEDICINE

## 2022-02-16 PROCEDURE — 99284 EMERGENCY DEPT VISIT MOD MDM: CPT

## 2022-02-16 PROCEDURE — 96374 THER/PROPH/DIAG INJ IV PUSH: CPT

## 2022-02-16 PROCEDURE — 80048 BASIC METABOLIC PNL TOTAL CA: CPT | Performed by: EMERGENCY MEDICINE

## 2022-02-16 PROCEDURE — 86901 BLOOD TYPING SEROLOGIC RH(D): CPT | Performed by: EMERGENCY MEDICINE

## 2022-02-16 RX ORDER — CEPHALEXIN 500 MG/1
500 CAPSULE ORAL 2 TIMES DAILY
Qty: 14 CAPSULE | Refills: 0 | Status: SHIPPED | OUTPATIENT
Start: 2022-02-16 | End: 2022-02-23

## 2022-02-16 RX ORDER — ONDANSETRON 2 MG/ML
4 INJECTION INTRAMUSCULAR; INTRAVENOUS ONCE
Status: COMPLETED | OUTPATIENT
Start: 2022-02-16 | End: 2022-02-16

## 2022-02-16 RX ORDER — MECLIZINE HYDROCHLORIDE 25 MG/1
25 TABLET ORAL ONCE
Status: COMPLETED | OUTPATIENT
Start: 2022-02-16 | End: 2022-02-16

## 2022-02-17 NOTE — ED INITIAL ASSESSMENT (HPI)
Pt arrived to ed c/o headache and dizziness and nosebleed since she fell and hit head on Saturday. Per pt she is on eliquis. She denies loc. Bruising to right eye noted.

## 2022-02-22 ENCOUNTER — PATIENT MESSAGE (OUTPATIENT)
Dept: FAMILY MEDICINE CLINIC | Facility: CLINIC | Age: 37
End: 2022-02-22

## 2022-02-22 NOTE — TELEPHONE ENCOUNTER
Last ov 1/20/2022    Next ov 4/12/2022    Last refill phentermine 1/20/2022    Last refill juanis 11/16/2021            Note from pt Still having issues getting into [facility]. I need a refill on my phentermine and my birth control. Are you able to call these into my pharmacy? Still having issues getting into [facility]. I need a refill on my phentermine and my birth control. Are you able to call these into my pharmacy?

## 2022-02-22 NOTE — TELEPHONE ENCOUNTER
From: Jerica White  To: Pema Call DO  Sent: 2/22/2022 1:03 PM CST  Subject: Refill    Still having issues getting into [facility]. I need a refill on my phentermine and my birth control. Are you able to call these into my pharmacy?

## 2022-02-25 RX ORDER — PHENTERMINE HYDROCHLORIDE 37.5 MG/1
37.5 TABLET ORAL
Qty: 30 TABLET | Refills: 0 | Status: SHIPPED | OUTPATIENT
Start: 2022-02-25

## 2022-02-25 RX ORDER — NORETHINDRONE ACETATE AND ETHINYL ESTRADIOL 1MG-20(21)
1 KIT ORAL DAILY
Qty: 84 TABLET | Refills: 0 | Status: SHIPPED | OUTPATIENT
Start: 2022-02-25

## 2022-03-02 ENCOUNTER — TELEPHONE (OUTPATIENT)
Dept: FAMILY MEDICINE CLINIC | Facility: CLINIC | Age: 37
End: 2022-03-02

## 2022-03-02 PROBLEM — D50.9 IRON DEFICIENCY ANEMIA, UNSPECIFIED: Status: ACTIVE | Noted: 2022-03-02

## 2022-03-15 ENCOUNTER — OFFICE VISIT (OUTPATIENT)
Dept: ORTHOPEDICS CLINIC | Facility: CLINIC | Age: 37
End: 2022-03-15
Payer: COMMERCIAL

## 2022-03-15 VITALS — WEIGHT: 205 LBS | HEIGHT: 67 IN | BODY MASS INDEX: 32.18 KG/M2

## 2022-03-15 DIAGNOSIS — G56.03 BILATERAL CARPAL TUNNEL SYNDROME: ICD-10-CM

## 2022-03-15 DIAGNOSIS — S64.10XA: ICD-10-CM

## 2022-03-15 DIAGNOSIS — M65.4 TENOSYNOVITIS OF RADIAL STYLOID: Primary | ICD-10-CM

## 2022-03-15 PROCEDURE — 99212 OFFICE O/P EST SF 10 MIN: CPT | Performed by: ORTHOPAEDIC SURGERY

## 2022-03-15 PROCEDURE — 3008F BODY MASS INDEX DOCD: CPT | Performed by: ORTHOPAEDIC SURGERY

## 2022-03-18 ENCOUNTER — TELEPHONE (OUTPATIENT)
Dept: FAMILY MEDICINE CLINIC | Facility: CLINIC | Age: 37
End: 2022-03-18

## 2022-03-21 ENCOUNTER — TELEPHONE (OUTPATIENT)
Dept: HEMATOLOGY/ONCOLOGY | Facility: HOSPITAL | Age: 37
End: 2022-03-21

## 2022-03-23 ENCOUNTER — TELEPHONE (OUTPATIENT)
Dept: HEMATOLOGY/ONCOLOGY | Facility: HOSPITAL | Age: 37
End: 2022-03-23

## 2022-03-23 NOTE — TELEPHONE ENCOUNTER
Received a call back from [place], [place] from Dr. James Piper office for Venofer 200 mg IVP to be given weekly x 4 doses. Will update the orders.

## 2022-03-24 ENCOUNTER — APPOINTMENT (OUTPATIENT)
Dept: CT IMAGING | Facility: HOSPITAL | Age: 37
End: 2022-03-24
Attending: EMERGENCY MEDICINE
Payer: COMMERCIAL

## 2022-03-24 ENCOUNTER — HOSPITAL ENCOUNTER (INPATIENT)
Facility: HOSPITAL | Age: 37
LOS: 2 days | Discharge: HOME | End: 2022-03-26
Attending: EMERGENCY MEDICINE | Admitting: HOSPITALIST
Payer: COMMERCIAL

## 2022-03-24 ENCOUNTER — OFFICE VISIT (OUTPATIENT)
Dept: HEMATOLOGY/ONCOLOGY | Facility: HOSPITAL | Age: 37
End: 2022-03-24
Attending: INTERNAL MEDICINE
Payer: COMMERCIAL

## 2022-03-24 VITALS
DIASTOLIC BLOOD PRESSURE: 81 MMHG | RESPIRATION RATE: 20 BRPM | TEMPERATURE: 98 F | HEART RATE: 107 BPM | BODY MASS INDEX: 34 KG/M2 | WEIGHT: 214.19 LBS | SYSTOLIC BLOOD PRESSURE: 137 MMHG | OXYGEN SATURATION: 99 %

## 2022-03-24 DIAGNOSIS — D50.9 IRON DEFICIENCY ANEMIA, UNSPECIFIED: Primary | ICD-10-CM

## 2022-03-24 DIAGNOSIS — D50.0 IRON DEFICIENCY ANEMIA DUE TO CHRONIC BLOOD LOSS: ICD-10-CM

## 2022-03-24 DIAGNOSIS — R07.89 CHEST PAIN, ATYPICAL: ICD-10-CM

## 2022-03-24 DIAGNOSIS — R06.00 ACUTE DYSPNEA: Primary | ICD-10-CM

## 2022-03-24 DIAGNOSIS — T50.905A MEDICATION REACTION, INITIAL ENCOUNTER: ICD-10-CM

## 2022-03-24 DIAGNOSIS — T80.90XA INFUSION REACTION, INITIAL ENCOUNTER: Primary | ICD-10-CM

## 2022-03-24 DIAGNOSIS — R42 DIZZINESS: ICD-10-CM

## 2022-03-24 LAB
ALBUMIN SERPL-MCNC: 3.4 G/DL (ref 3.4–5)
ALBUMIN/GLOB SERPL: 0.9 {RATIO} (ref 1–2)
ALP LIVER SERPL-CCNC: 68 U/L
ALT SERPL-CCNC: 12 U/L
ANION GAP SERPL CALC-SCNC: 6 MMOL/L (ref 0–18)
APTT PPP: 39.6 SECONDS (ref 23.3–35.6)
ARTERIAL PATENCY WRIST A: POSITIVE
AST SERPL-CCNC: 7 U/L (ref 15–37)
ATRIAL RATE: 87 BPM
B-HCG SERPL-ACNC: <1 MIU/ML
B-HCG UR QL: NEGATIVE
BASOPHILS # BLD AUTO: 0.02 X10(3) UL (ref 0–0.2)
BASOPHILS NFR BLD AUTO: 0.4 %
BILIRUB SERPL-MCNC: 0.3 MG/DL (ref 0.1–2)
BILIRUB UR QL STRIP.AUTO: NEGATIVE
BODY TEMPERATURE: 98.6 F
BUN BLD-MCNC: 8 MG/DL (ref 7–18)
CA-I BLD-SCNC: 1.18 MMOL/L (ref 0.95–1.32)
CALCIUM BLD-MCNC: 8.8 MG/DL (ref 8.5–10.1)
CHLORIDE SERPL-SCNC: 116 MMOL/L (ref 98–112)
CLARITY UR REFRACT.AUTO: CLEAR
CO2 SERPL-SCNC: 17 MMOL/L (ref 21–32)
COHGB MFR BLD: 0.8 % SAT (ref 0–3)
COLOR UR AUTO: YELLOW
CREAT BLD-MCNC: 0.94 MG/DL
EOSINOPHIL # BLD AUTO: 0.1 X10(3) UL (ref 0–0.7)
EOSINOPHIL NFR BLD AUTO: 2 %
ERYTHROCYTE [DISTWIDTH] IN BLOOD BY AUTOMATED COUNT: 15 %
GLOBULIN PLAS-MCNC: 3.8 G/DL (ref 2.8–4.4)
GLUCOSE BLD-MCNC: 100 MG/DL (ref 70–99)
GLUCOSE UR STRIP.AUTO-MCNC: NEGATIVE MG/DL
HCT VFR BLD AUTO: 26.8 %
HGB BLD-MCNC: 8.2 G/DL
HGB BLD-MCNC: 8.9 G/DL
IMM GRANULOCYTES # BLD AUTO: 0.02 X10(3) UL (ref 0–1)
IMM GRANULOCYTES NFR BLD: 0.4 %
INR BLD: 1.83 (ref 0.8–1.2)
KETONES UR STRIP.AUTO-MCNC: 20 MG/DL
LACTATE BLD-SCNC: 4.4 MMOL/L (ref 0.5–2)
LEUKOCYTE ESTERASE UR QL STRIP.AUTO: NEGATIVE
LYMPHOCYTES # BLD AUTO: 1.56 X10(3) UL (ref 1–4)
LYMPHOCYTES NFR BLD AUTO: 32 %
MCH RBC QN AUTO: 24.4 PG (ref 26–34)
MCHC RBC AUTO-ENTMCNC: 30.6 G/DL (ref 31–37)
MCV RBC AUTO: 79.8 FL
METHGB MFR BLD: 1.6 % SAT (ref 0.4–1.5)
MONOCYTES # BLD AUTO: 0.39 X10(3) UL (ref 0.1–1)
MONOCYTES NFR BLD AUTO: 8 %
NEUTROPHILS # BLD AUTO: 2.79 X10 (3) UL (ref 1.5–7.7)
NEUTROPHILS # BLD AUTO: 2.79 X10(3) UL (ref 1.5–7.7)
NEUTROPHILS NFR BLD AUTO: 57.2 %
NITRITE UR QL STRIP.AUTO: NEGATIVE
OSMOLALITY SERPL CALC.SUM OF ELEC: 286 MOSM/KG (ref 275–295)
OXYHGB MFR BLDA: 97 % (ref 92–100)
P AXIS: 46 DEGREES
P-R INTERVAL: 126 MS
PCO2 BLDA: 18 MM HG (ref 35–45)
PH BLDA: 7.5 [PH] (ref 7.35–7.45)
PH UR STRIP.AUTO: 8 [PH] (ref 5–8)
PLATELET # BLD AUTO: 282 10(3)UL (ref 150–450)
PO2 BLDA: 155 MM HG (ref 80–100)
POTASSIUM BLD-SCNC: 3 MMOL/L (ref 3.6–5.1)
POTASSIUM SERPL-SCNC: 3.9 MMOL/L (ref 3.5–5.1)
PROT SERPL-MCNC: 7.2 G/DL (ref 6.4–8.2)
PROT UR STRIP.AUTO-MCNC: NEGATIVE MG/DL
PROTHROMBIN TIME: 21.3 SECONDS (ref 11.6–14.8)
Q-T INTERVAL: 382 MS
QRS DURATION: 90 MS
QTC CALCULATION (BEZET): 459 MS
R AXIS: 21 DEGREES
RBC # BLD AUTO: 3.36 X10(6)UL
RBC UR QL AUTO: NEGATIVE
SALICYLATES SERPL-MCNC: 1.8 MG/DL (ref 2.8–20)
SARS-COV-2 RNA RESP QL NAA+PROBE: NOT DETECTED
SODIUM BLD-SCNC: 140 MMOL/L (ref 135–145)
SODIUM SERPL-SCNC: 139 MMOL/L (ref 136–145)
SP GR UR STRIP.AUTO: >1.03 (ref 1–1.03)
T AXIS: 44 DEGREES
TROPONIN I HIGH SENSITIVITY: <3 NG/L
TSI SER-ACNC: 0.55 MIU/ML (ref 0.36–3.74)
UROBILINOGEN UR STRIP.AUTO-MCNC: <2 MG/DL
VENTRICULAR RATE: 87 BPM
WBC # BLD AUTO: 4.9 X10(3) UL (ref 4–11)

## 2022-03-24 PROCEDURE — 84295 ASSAY OF SERUM SODIUM: CPT | Performed by: EMERGENCY MEDICINE

## 2022-03-24 PROCEDURE — 99285 EMERGENCY DEPT VISIT HI MDM: CPT

## 2022-03-24 PROCEDURE — 93005 ELECTROCARDIOGRAM TRACING: CPT

## 2022-03-24 PROCEDURE — 83050 HGB METHEMOGLOBIN QUAN: CPT | Performed by: EMERGENCY MEDICINE

## 2022-03-24 PROCEDURE — 81003 URINALYSIS AUTO W/O SCOPE: CPT | Performed by: HOSPITALIST

## 2022-03-24 PROCEDURE — 70450 CT HEAD/BRAIN W/O DYE: CPT | Performed by: EMERGENCY MEDICINE

## 2022-03-24 PROCEDURE — 71275 CT ANGIOGRAPHY CHEST: CPT | Performed by: EMERGENCY MEDICINE

## 2022-03-24 PROCEDURE — 93010 ELECTROCARDIOGRAM REPORT: CPT

## 2022-03-24 PROCEDURE — 99215 OFFICE O/P EST HI 40 MIN: CPT | Performed by: CLINICAL NURSE SPECIALIST

## 2022-03-24 PROCEDURE — 85018 HEMOGLOBIN: CPT | Performed by: EMERGENCY MEDICINE

## 2022-03-24 PROCEDURE — 80053 COMPREHEN METABOLIC PANEL: CPT | Performed by: EMERGENCY MEDICINE

## 2022-03-24 PROCEDURE — 96374 THER/PROPH/DIAG INJ IV PUSH: CPT

## 2022-03-24 PROCEDURE — S0028 INJECTION, FAMOTIDINE, 20 MG: HCPCS | Performed by: INTERNAL MEDICINE

## 2022-03-24 PROCEDURE — 85025 COMPLETE CBC W/AUTO DIFF WBC: CPT | Performed by: EMERGENCY MEDICINE

## 2022-03-24 PROCEDURE — 82375 ASSAY CARBOXYHB QUANT: CPT | Performed by: EMERGENCY MEDICINE

## 2022-03-24 PROCEDURE — 82803 BLOOD GASES ANY COMBINATION: CPT | Performed by: EMERGENCY MEDICINE

## 2022-03-24 PROCEDURE — 36600 WITHDRAWAL OF ARTERIAL BLOOD: CPT | Performed by: EMERGENCY MEDICINE

## 2022-03-24 PROCEDURE — 82330 ASSAY OF CALCIUM: CPT | Performed by: EMERGENCY MEDICINE

## 2022-03-24 PROCEDURE — 81025 URINE PREGNANCY TEST: CPT

## 2022-03-24 PROCEDURE — 85610 PROTHROMBIN TIME: CPT | Performed by: EMERGENCY MEDICINE

## 2022-03-24 PROCEDURE — 84702 CHORIONIC GONADOTROPIN TEST: CPT | Performed by: EMERGENCY MEDICINE

## 2022-03-24 PROCEDURE — 85730 THROMBOPLASTIN TIME PARTIAL: CPT | Performed by: EMERGENCY MEDICINE

## 2022-03-24 PROCEDURE — 84443 ASSAY THYROID STIM HORMONE: CPT | Performed by: INTERNAL MEDICINE

## 2022-03-24 PROCEDURE — 84132 ASSAY OF SERUM POTASSIUM: CPT | Performed by: EMERGENCY MEDICINE

## 2022-03-24 PROCEDURE — 84484 ASSAY OF TROPONIN QUANT: CPT | Performed by: EMERGENCY MEDICINE

## 2022-03-24 PROCEDURE — 96375 TX/PRO/DX INJ NEW DRUG ADDON: CPT

## 2022-03-24 PROCEDURE — 80179 DRUG ASSAY SALICYLATE: CPT | Performed by: INTERNAL MEDICINE

## 2022-03-24 PROCEDURE — 94640 AIRWAY INHALATION TREATMENT: CPT

## 2022-03-24 PROCEDURE — 83605 ASSAY OF LACTIC ACID: CPT | Performed by: EMERGENCY MEDICINE

## 2022-03-24 RX ORDER — PREDNISONE 20 MG/1
40 TABLET ORAL
Status: DISCONTINUED | OUTPATIENT
Start: 2022-03-25 | End: 2022-03-25

## 2022-03-24 RX ORDER — SENNOSIDES 8.6 MG
17.2 TABLET ORAL NIGHTLY PRN
Status: DISCONTINUED | OUTPATIENT
Start: 2022-03-24 | End: 2022-03-26

## 2022-03-24 RX ORDER — FAMOTIDINE 10 MG/ML
20 INJECTION, SOLUTION INTRAVENOUS 2 TIMES DAILY
Status: DISCONTINUED | OUTPATIENT
Start: 2022-03-24 | End: 2022-03-25

## 2022-03-24 RX ORDER — IPRATROPIUM BROMIDE AND ALBUTEROL SULFATE 2.5; .5 MG/3ML; MG/3ML
3 SOLUTION RESPIRATORY (INHALATION) EVERY 6 HOURS PRN
Status: DISCONTINUED | OUTPATIENT
Start: 2022-03-24 | End: 2022-03-26

## 2022-03-24 RX ORDER — LORAZEPAM 2 MG/ML
0.5 INJECTION INTRAMUSCULAR ONCE
Status: COMPLETED | OUTPATIENT
Start: 2022-03-24 | End: 2022-03-24

## 2022-03-24 RX ORDER — TOPIRAMATE 25 MG/1
50 TABLET ORAL 2 TIMES DAILY
Status: DISCONTINUED | OUTPATIENT
Start: 2022-03-24 | End: 2022-03-26

## 2022-03-24 RX ORDER — MEPERIDINE HYDROCHLORIDE 25 MG/ML
25 INJECTION INTRAMUSCULAR; INTRAVENOUS; SUBCUTANEOUS ONCE
OUTPATIENT
Start: 2022-04-11

## 2022-03-24 RX ORDER — FAMOTIDINE 10 MG/ML
20 INJECTION, SOLUTION INTRAVENOUS ONCE
Status: CANCELLED | OUTPATIENT
Start: 2022-04-11

## 2022-03-24 RX ORDER — POLYETHYLENE GLYCOL 3350 17 G/17G
17 POWDER, FOR SOLUTION ORAL DAILY PRN
Status: DISCONTINUED | OUTPATIENT
Start: 2022-03-24 | End: 2022-03-26

## 2022-03-24 RX ORDER — SODIUM CHLORIDE 9 MG/ML
INJECTION, SOLUTION INTRAVENOUS CONTINUOUS
Status: ACTIVE | OUTPATIENT
Start: 2022-03-24 | End: 2022-03-24

## 2022-03-24 RX ORDER — METHYLPREDNISOLONE SODIUM SUCCINATE 40 MG/ML
40 INJECTION, POWDER, LYOPHILIZED, FOR SOLUTION INTRAMUSCULAR; INTRAVENOUS EVERY 8 HOURS
Status: DISPENSED | OUTPATIENT
Start: 2022-03-24 | End: 2022-03-25

## 2022-03-24 RX ORDER — DIPHENHYDRAMINE HCL 25 MG
25 CAPSULE ORAL EVERY 6 HOURS PRN
Status: DISCONTINUED | OUTPATIENT
Start: 2022-03-24 | End: 2022-03-26

## 2022-03-24 RX ORDER — FLUOXETINE HYDROCHLORIDE 20 MG/1
60 CAPSULE ORAL EVERY MORNING
Status: DISCONTINUED | OUTPATIENT
Start: 2022-03-25 | End: 2022-03-26

## 2022-03-24 RX ORDER — BISACODYL 10 MG
10 SUPPOSITORY, RECTAL RECTAL
Status: DISCONTINUED | OUTPATIENT
Start: 2022-03-24 | End: 2022-03-26

## 2022-03-24 RX ORDER — IPRATROPIUM BROMIDE AND ALBUTEROL SULFATE 2.5; .5 MG/3ML; MG/3ML
3 SOLUTION RESPIRATORY (INHALATION) ONCE
Status: COMPLETED | OUTPATIENT
Start: 2022-03-24 | End: 2022-03-24

## 2022-03-24 RX ORDER — FAMOTIDINE 10 MG/ML
20 INJECTION, SOLUTION INTRAVENOUS ONCE
Status: COMPLETED | OUTPATIENT
Start: 2022-03-24 | End: 2022-03-24

## 2022-03-24 RX ORDER — PROCHLORPERAZINE EDISYLATE 5 MG/ML
5 INJECTION INTRAMUSCULAR; INTRAVENOUS EVERY 8 HOURS PRN
Status: DISCONTINUED | OUTPATIENT
Start: 2022-03-24 | End: 2022-03-26

## 2022-03-24 RX ORDER — MELATONIN
3 NIGHTLY PRN
Status: DISCONTINUED | OUTPATIENT
Start: 2022-03-24 | End: 2022-03-26

## 2022-03-24 RX ORDER — SODIUM PHOSPHATE, DIBASIC AND SODIUM PHOSPHATE, MONOBASIC 7; 19 G/133ML; G/133ML
1 ENEMA RECTAL ONCE AS NEEDED
Status: DISCONTINUED | OUTPATIENT
Start: 2022-03-24 | End: 2022-03-26

## 2022-03-24 RX ORDER — ALBUTEROL SULFATE 90 UG/1
2 AEROSOL, METERED RESPIRATORY (INHALATION) EVERY 6 HOURS PRN
Status: DISCONTINUED | OUTPATIENT
Start: 2022-03-24 | End: 2022-03-26

## 2022-03-24 RX ORDER — METHYLPREDNISOLONE SODIUM SUCCINATE 125 MG/2ML
125 INJECTION, POWDER, LYOPHILIZED, FOR SOLUTION INTRAMUSCULAR; INTRAVENOUS ONCE
Status: COMPLETED | OUTPATIENT
Start: 2022-03-24 | End: 2022-03-24

## 2022-03-24 RX ORDER — METHYLPREDNISOLONE SODIUM SUCCINATE 40 MG/ML
40 INJECTION, POWDER, LYOPHILIZED, FOR SOLUTION INTRAMUSCULAR; INTRAVENOUS ONCE
OUTPATIENT
Start: 2022-04-11

## 2022-03-24 RX ORDER — DIPHENHYDRAMINE HYDROCHLORIDE 50 MG/ML
25 INJECTION INTRAMUSCULAR; INTRAVENOUS ONCE
OUTPATIENT
Start: 2022-04-11

## 2022-03-24 RX ORDER — CLONAZEPAM 0.5 MG/1
0.5 TABLET ORAL 2 TIMES DAILY PRN
Status: DISCONTINUED | OUTPATIENT
Start: 2022-03-24 | End: 2022-03-26

## 2022-03-24 RX ORDER — CETIRIZINE HYDROCHLORIDE 10 MG/1
10 TABLET ORAL DAILY
Status: DISCONTINUED | OUTPATIENT
Start: 2022-03-25 | End: 2022-03-26

## 2022-03-24 RX ORDER — ONDANSETRON 2 MG/ML
4 INJECTION INTRAMUSCULAR; INTRAVENOUS EVERY 6 HOURS PRN
Status: DISCONTINUED | OUTPATIENT
Start: 2022-03-24 | End: 2022-03-26

## 2022-03-24 RX ORDER — IPRATROPIUM BROMIDE AND ALBUTEROL SULFATE 2.5; .5 MG/3ML; MG/3ML
3 SOLUTION RESPIRATORY (INHALATION)
Status: DISCONTINUED | OUTPATIENT
Start: 2022-03-24 | End: 2022-03-25

## 2022-03-24 RX ORDER — PHENTERMINE HYDROCHLORIDE 37.5 MG/1
37.5 TABLET ORAL
Status: DISCONTINUED | OUTPATIENT
Start: 2022-03-25 | End: 2022-03-26

## 2022-03-24 RX ORDER — IOHEXOL 350 MG/ML
100 INJECTION, SOLUTION INTRAVENOUS
Status: COMPLETED | OUTPATIENT
Start: 2022-03-24 | End: 2022-03-24

## 2022-03-24 RX ORDER — ACETAMINOPHEN 325 MG/1
650 TABLET ORAL EVERY 6 HOURS PRN
Status: DISCONTINUED | OUTPATIENT
Start: 2022-03-24 | End: 2022-03-26

## 2022-03-24 RX ORDER — METHYLPREDNISOLONE SODIUM SUCCINATE 125 MG/2ML
125 INJECTION, POWDER, LYOPHILIZED, FOR SOLUTION INTRAMUSCULAR; INTRAVENOUS ONCE
OUTPATIENT
Start: 2022-04-11

## 2022-03-24 RX ADMIN — FAMOTIDINE 20 MG: 10 INJECTION, SOLUTION INTRAVENOUS at 14:15:00

## 2022-03-24 RX ADMIN — METHYLPREDNISOLONE SODIUM SUCCINATE 125 MG: 125 INJECTION, POWDER, LYOPHILIZED, FOR SOLUTION INTRAMUSCULAR; INTRAVENOUS at 14:06:00

## 2022-03-24 NOTE — ED INITIAL ASSESSMENT (HPI)
Patient had sudden onset of SOB while having [facility] infusion at cancer [facility]. Per report pt was 70% through infusion.  Patient states this feels like prior PE.

## 2022-03-24 NOTE — ED QUICK NOTES
Orders for admission, patient is aware of plan and ready to go upstairs. Any questions, please call ED RN Lilia at extension [phone]. Patient Covid vaccination status: Fully vaccinated     COVID Test Ordered in ED: Rapid SARS-CoV-2 by PCR    COVID Suspicion at Admission: Low clinical suspicion for COVID    Running Infusions:  None    Mental Status/LOC at time of transport: A/Ox4, Pt is very anxious.      Other pertinent information:   CIWA score: N/A   NIH score:  N/A

## 2022-03-25 LAB
ANION GAP SERPL CALC-SCNC: 12 MMOL/L (ref 0–18)
ANION GAP SERPL CALC-SCNC: 5 MMOL/L (ref 0–18)
ANION GAP SERPL CALC-SCNC: 8 MMOL/L (ref 0–18)
ANTIBODY SCREEN: NEGATIVE
ARTERIAL PATENCY WRIST A: POSITIVE
ARTERIAL PATENCY WRIST A: POSITIVE
BASE EXCESS BLDA CALC-SCNC: -10.3 MMOL/L (ref ?–2)
BASE EXCESS BLDA CALC-SCNC: -10.9 MMOL/L (ref ?–2)
BASOPHILS # BLD AUTO: 0.01 X10(3) UL (ref 0–0.2)
BASOPHILS # BLD AUTO: 0.01 X10(3) UL (ref 0–0.2)
BASOPHILS NFR BLD AUTO: 0.1 %
BASOPHILS NFR BLD AUTO: 0.1 %
BODY TEMPERATURE: 98.6 F
BODY TEMPERATURE: 98.6 F
BUN BLD-MCNC: 6 MG/DL (ref 7–18)
BUN BLD-MCNC: 7 MG/DL (ref 7–18)
BUN BLD-MCNC: 7 MG/DL (ref 7–18)
CA-I BLD-SCNC: 1.25 MMOL/L (ref 0.95–1.32)
CALCIUM BLD-MCNC: 8.1 MG/DL (ref 8.5–10.1)
CALCIUM BLD-MCNC: 8.6 MG/DL (ref 8.5–10.1)
CALCIUM BLD-MCNC: 8.8 MG/DL (ref 8.5–10.1)
CHLORIDE SERPL-SCNC: 117 MMOL/L (ref 98–112)
CHLORIDE SERPL-SCNC: 117 MMOL/L (ref 98–112)
CHLORIDE SERPL-SCNC: 118 MMOL/L (ref 98–112)
CO2 SERPL-SCNC: 11 MMOL/L (ref 21–32)
CO2 SERPL-SCNC: 15 MMOL/L (ref 21–32)
CO2 SERPL-SCNC: 16 MMOL/L (ref 21–32)
COHGB MFR BLD: 0.8 % SAT (ref 0–3)
COHGB MFR BLD: 1.8 % SAT (ref 0–3)
CREAT BLD-MCNC: 0.79 MG/DL
CREAT BLD-MCNC: 0.87 MG/DL
CREAT BLD-MCNC: 0.96 MG/DL
DEPRECATED HBV CORE AB SER IA-ACNC: 80.2 NG/ML
EOSINOPHIL # BLD AUTO: 0 X10(3) UL (ref 0–0.7)
EOSINOPHIL # BLD AUTO: 0 X10(3) UL (ref 0–0.7)
EOSINOPHIL NFR BLD AUTO: 0 %
EOSINOPHIL NFR BLD AUTO: 0 %
ERYTHROCYTE [DISTWIDTH] IN BLOOD BY AUTOMATED COUNT: 15.1 %
ERYTHROCYTE [DISTWIDTH] IN BLOOD BY AUTOMATED COUNT: 15.2 %
ERYTHROCYTE [DISTWIDTH] IN BLOOD BY AUTOMATED COUNT: 15.4 %
GLUCOSE BLD-MCNC: 114 MG/DL (ref 70–99)
GLUCOSE BLD-MCNC: 128 MG/DL (ref 70–99)
GLUCOSE BLD-MCNC: 134 MG/DL (ref 70–99)
HCO3 BLDA-SCNC: 16.5 MEQ/L (ref 21–27)
HCO3 BLDA-SCNC: 16.9 MEQ/L (ref 21–27)
HCT VFR BLD AUTO: 24.8 %
HCT VFR BLD AUTO: 25.5 %
HCT VFR BLD AUTO: 27.3 %
HGB BLD-MCNC: 7 G/DL
HGB BLD-MCNC: 7.5 G/DL
HGB BLD-MCNC: 7.6 G/DL
HGB BLD-MCNC: 7.7 G/DL
HGB BLD-MCNC: 7.9 G/DL
HGB BLD-MCNC: 8.1 G/DL
HGB BLD-MCNC: 8.7 G/DL
IMM GRANULOCYTES # BLD AUTO: 0.03 X10(3) UL (ref 0–1)
IMM GRANULOCYTES # BLD AUTO: 0.04 X10(3) UL (ref 0–1)
IMM GRANULOCYTES NFR BLD: 0.3 %
IMM GRANULOCYTES NFR BLD: 0.4 %
IRON SATN MFR SERPL: 40 %
IRON SERPL-MCNC: 206 UG/DL
L/M: 2 L/MIN
L/M: 3 L/MIN
LACTATE BLD-SCNC: 1.9 MMOL/L (ref 0.5–2)
LACTATE SERPL-SCNC: 2.1 MMOL/L (ref 0.4–2)
LACTATE SERPL-SCNC: 2.5 MMOL/L (ref 0.4–2)
LACTATE SERPL-SCNC: 6.3 MMOL/L (ref 0.4–2)
LYMPHOCYTES # BLD AUTO: 0.43 X10(3) UL (ref 1–4)
LYMPHOCYTES # BLD AUTO: 0.68 X10(3) UL (ref 1–4)
LYMPHOCYTES NFR BLD AUTO: 4.4 %
LYMPHOCYTES NFR BLD AUTO: 7.7 %
MAGNESIUM SERPL-MCNC: 2 MG/DL (ref 1.6–2.6)
MCH RBC QN AUTO: 24.1 PG (ref 26–34)
MCH RBC QN AUTO: 24.4 PG (ref 26–34)
MCH RBC QN AUTO: 24.5 PG (ref 26–34)
MCHC RBC AUTO-ENTMCNC: 29.7 G/DL (ref 31–37)
MCHC RBC AUTO-ENTMCNC: 30.6 G/DL (ref 31–37)
MCHC RBC AUTO-ENTMCNC: 31 G/DL (ref 31–37)
MCV RBC AUTO: 78.9 FL
MCV RBC AUTO: 79.5 FL
MCV RBC AUTO: 81.3 FL
METHGB MFR BLD: 1 % SAT (ref 0.4–1.5)
METHGB MFR BLD: 1 % SAT (ref 0.4–1.5)
MONOCYTES # BLD AUTO: 0.22 X10(3) UL (ref 0.1–1)
MONOCYTES # BLD AUTO: 0.71 X10(3) UL (ref 0.1–1)
MONOCYTES NFR BLD AUTO: 2.3 %
MONOCYTES NFR BLD AUTO: 8.1 %
NEUTROPHILS # BLD AUTO: 7.35 X10 (3) UL (ref 1.5–7.7)
NEUTROPHILS # BLD AUTO: 7.35 X10(3) UL (ref 1.5–7.7)
NEUTROPHILS # BLD AUTO: 8.98 X10 (3) UL (ref 1.5–7.7)
NEUTROPHILS # BLD AUTO: 8.98 X10(3) UL (ref 1.5–7.7)
NEUTROPHILS NFR BLD AUTO: 83.8 %
NEUTROPHILS NFR BLD AUTO: 92.8 %
OSMOLALITY SERPL CALC.SUM OF ELEC: 284 MOSM/KG (ref 275–295)
OSMOLALITY SERPL CALC.SUM OF ELEC: 290 MOSM/KG (ref 275–295)
OSMOLALITY SERPL CALC.SUM OF ELEC: 292 MOSM/KG (ref 275–295)
OXYHGB MFR BLDA: 96.9 % (ref 92–100)
OXYHGB MFR BLDA: 97.2 % (ref 92–100)
PCO2 BLDA: 23 MM HG (ref 35–45)
PCO2 BLDA: 23 MM HG (ref 35–45)
PH BLDA: 7.37 [PH] (ref 7.35–7.45)
PH BLDA: 7.38 [PH] (ref 7.35–7.45)
PHOSPHATE SERPL-MCNC: 1.1 MG/DL (ref 2.5–4.9)
PHOSPHATE SERPL-MCNC: 1.2 MG/DL (ref 2.5–4.9)
PLATELET # BLD AUTO: 264 10(3)UL (ref 150–450)
PLATELET # BLD AUTO: 269 10(3)UL (ref 150–450)
PLATELET # BLD AUTO: 278 10(3)UL (ref 150–450)
PO2 BLDA: 141 MM HG (ref 80–100)
PO2 BLDA: 143 MM HG (ref 80–100)
POTASSIUM BLD-SCNC: 4.1 MMOL/L (ref 3.6–5.1)
POTASSIUM SERPL-SCNC: 3.7 MMOL/L (ref 3.5–5.1)
POTASSIUM SERPL-SCNC: 3.9 MMOL/L (ref 3.5–5.1)
POTASSIUM SERPL-SCNC: 4 MMOL/L (ref 3.5–5.1)
RBC # BLD AUTO: 3.12 X10(6)UL
RBC # BLD AUTO: 3.23 X10(6)UL
RBC # BLD AUTO: 3.36 X10(6)UL
RH BLOOD TYPE: POSITIVE
SODIUM BLD-SCNC: 137 MMOL/L (ref 135–145)
SODIUM SERPL-SCNC: 138 MMOL/L (ref 136–145)
SODIUM SERPL-SCNC: 140 MMOL/L (ref 136–145)
SODIUM SERPL-SCNC: 141 MMOL/L (ref 136–145)
TIBC SERPL-MCNC: 520 UG/DL (ref 240–450)
TRANSFERRIN SERPL-MCNC: 349 MG/DL (ref 200–360)
WBC # BLD AUTO: 6.9 X10(3) UL (ref 4–11)
WBC # BLD AUTO: 8.8 X10(3) UL (ref 4–11)
WBC # BLD AUTO: 9.7 X10(3) UL (ref 4–11)

## 2022-03-25 PROCEDURE — 82803 BLOOD GASES ANY COMBINATION: CPT | Performed by: INTERNAL MEDICINE

## 2022-03-25 PROCEDURE — 85027 COMPLETE CBC AUTOMATED: CPT | Performed by: HOSPITALIST

## 2022-03-25 PROCEDURE — 36600 WITHDRAWAL OF ARTERIAL BLOOD: CPT | Performed by: INTERNAL MEDICINE

## 2022-03-25 PROCEDURE — 84100 ASSAY OF PHOSPHORUS: CPT | Performed by: HOSPITALIST

## 2022-03-25 PROCEDURE — 85025 COMPLETE CBC W/AUTO DIFF WBC: CPT | Performed by: INTERNAL MEDICINE

## 2022-03-25 PROCEDURE — 82803 BLOOD GASES ANY COMBINATION: CPT | Performed by: HOSPITALIST

## 2022-03-25 PROCEDURE — 85025 COMPLETE CBC W/AUTO DIFF WBC: CPT | Performed by: HOSPITALIST

## 2022-03-25 PROCEDURE — C9113 INJ PANTOPRAZOLE SODIUM, VIA: HCPCS | Performed by: HOSPITALIST

## 2022-03-25 PROCEDURE — 30233N1 TRANSFUSION OF NONAUTOLOGOUS RED BLOOD CELLS INTO PERIPHERAL VEIN, PERCUTANEOUS APPROACH: ICD-10-PCS | Performed by: HOSPITALIST

## 2022-03-25 PROCEDURE — 80048 BASIC METABOLIC PNL TOTAL CA: CPT | Performed by: INTERNAL MEDICINE

## 2022-03-25 PROCEDURE — 84295 ASSAY OF SERUM SODIUM: CPT | Performed by: HOSPITALIST

## 2022-03-25 PROCEDURE — 36600 WITHDRAWAL OF ARTERIAL BLOOD: CPT | Performed by: HOSPITALIST

## 2022-03-25 PROCEDURE — 82375 ASSAY CARBOXYHB QUANT: CPT | Performed by: HOSPITALIST

## 2022-03-25 PROCEDURE — 86900 BLOOD TYPING SEROLOGIC ABO: CPT | Performed by: NURSE PRACTITIONER

## 2022-03-25 PROCEDURE — 82375 ASSAY CARBOXYHB QUANT: CPT | Performed by: INTERNAL MEDICINE

## 2022-03-25 PROCEDURE — 83735 ASSAY OF MAGNESIUM: CPT | Performed by: HOSPITALIST

## 2022-03-25 PROCEDURE — 85018 HEMOGLOBIN: CPT | Performed by: HOSPITALIST

## 2022-03-25 PROCEDURE — 94640 AIRWAY INHALATION TREATMENT: CPT

## 2022-03-25 PROCEDURE — 86901 BLOOD TYPING SEROLOGIC RH(D): CPT | Performed by: NURSE PRACTITIONER

## 2022-03-25 PROCEDURE — 80048 BASIC METABOLIC PNL TOTAL CA: CPT | Performed by: HOSPITALIST

## 2022-03-25 PROCEDURE — 83605 ASSAY OF LACTIC ACID: CPT | Performed by: HOSPITALIST

## 2022-03-25 PROCEDURE — 83050 HGB METHEMOGLOBIN QUAN: CPT | Performed by: HOSPITALIST

## 2022-03-25 PROCEDURE — 84132 ASSAY OF SERUM POTASSIUM: CPT | Performed by: HOSPITALIST

## 2022-03-25 PROCEDURE — 83540 ASSAY OF IRON: CPT | Performed by: HOSPITALIST

## 2022-03-25 PROCEDURE — 86920 COMPATIBILITY TEST SPIN: CPT

## 2022-03-25 PROCEDURE — 85018 HEMOGLOBIN: CPT | Performed by: INTERNAL MEDICINE

## 2022-03-25 PROCEDURE — 82330 ASSAY OF CALCIUM: CPT | Performed by: HOSPITALIST

## 2022-03-25 PROCEDURE — 86850 RBC ANTIBODY SCREEN: CPT | Performed by: NURSE PRACTITIONER

## 2022-03-25 PROCEDURE — 83050 HGB METHEMOGLOBIN QUAN: CPT | Performed by: INTERNAL MEDICINE

## 2022-03-25 PROCEDURE — 83550 IRON BINDING TEST: CPT | Performed by: HOSPITALIST

## 2022-03-25 PROCEDURE — 36430 TRANSFUSION BLD/BLD COMPNT: CPT

## 2022-03-25 PROCEDURE — 83605 ASSAY OF LACTIC ACID: CPT | Performed by: INTERNAL MEDICINE

## 2022-03-25 PROCEDURE — 82728 ASSAY OF FERRITIN: CPT | Performed by: HOSPITALIST

## 2022-03-25 RX ORDER — SODIUM CHLORIDE 9 MG/ML
INJECTION, SOLUTION INTRAVENOUS CONTINUOUS
Status: DISCONTINUED | OUTPATIENT
Start: 2022-03-25 | End: 2022-03-26

## 2022-03-25 RX ORDER — MORPHINE SULFATE 4 MG/ML
4 INJECTION, SOLUTION INTRAMUSCULAR; INTRAVENOUS EVERY 2 HOUR PRN
Status: DISCONTINUED | OUTPATIENT
Start: 2022-03-25 | End: 2022-03-26

## 2022-03-25 RX ORDER — MORPHINE SULFATE 2 MG/ML
2 INJECTION, SOLUTION INTRAMUSCULAR; INTRAVENOUS EVERY 2 HOUR PRN
Status: DISCONTINUED | OUTPATIENT
Start: 2022-03-25 | End: 2022-03-26

## 2022-03-25 RX ORDER — IPRATROPIUM BROMIDE AND ALBUTEROL SULFATE 2.5; .5 MG/3ML; MG/3ML
3 SOLUTION RESPIRATORY (INHALATION) EVERY 4 HOURS PRN
Status: DISCONTINUED | OUTPATIENT
Start: 2022-03-25 | End: 2022-03-26

## 2022-03-25 RX ORDER — LORAZEPAM 2 MG/ML
1 INJECTION INTRAMUSCULAR EVERY 6 HOURS PRN
Status: DISCONTINUED | OUTPATIENT
Start: 2022-03-25 | End: 2022-03-26

## 2022-03-25 RX ORDER — SODIUM CHLORIDE 9 MG/ML
INJECTION, SOLUTION INTRAVENOUS ONCE
Status: COMPLETED | OUTPATIENT
Start: 2022-03-25 | End: 2022-03-25

## 2022-03-25 RX ORDER — MORPHINE SULFATE 2 MG/ML
1 INJECTION, SOLUTION INTRAMUSCULAR; INTRAVENOUS EVERY 2 HOUR PRN
Status: DISCONTINUED | OUTPATIENT
Start: 2022-03-25 | End: 2022-03-26

## 2022-03-25 NOTE — PLAN OF CARE
Patient was admitted to unit from ED at 8:30PM. Pt was received partial IV Venofer infusion earlier this afternoon at the [facility]. Pt had a reaction to infusion - c/o of dizziness, SOB and chest pain. Pt was given a 500 mL bolus, solumedrol, Pepcid and 2 puffs of personal albuterol inhaler, no relief in symptoms-- sent to ED. Pt was anxious in ED per note and received IV ativan. Pt was then transferred to unit. Pt arrived to unit experiences what appears to be a panic attack and hyperventilating. Attempted some copying strategies which calmed the patient down enough to place tele monitor and pulse ox. Supplemental oxygen applied, pt maintaining SPO2 of % but very tachypneic RR 40's. Was able to obtain a small amount of pt history but does not accurately match with pt's chart. C/o of severe flank and mid back pain, pt was recently treated for UTI - MD notified. UA (-) Pt reports taking Trazodone 75 mg, Klonopin 0.5mg and Alive \"15\" tabs NIGHTLY. Pt educated on risk associated with consuming that amount of NSAIDs. Pt had second panic attack with uncontrollable shaking and hyperventilating- c/o of being \"freezing\" - Charge RN also in the room to help assess pt's condition. VS stable at this time and afebrile. New orders given for IV ativan and repeat labs. Fluctuation in Lactic Acid highest (6.3) and lowest (1.9) - MD notified. Repeat ABG and BMP collected. Phosp (1.2) - replaced via electrolyte protocol. IV morphine given - pt appears calm at this time. RR 20's and pt is resting in bed. Repeat labs in AM. Orders for Psych liaison placed. POC d/w pt and addressed all questions and concerns at this time. Pt is resting in bed, bed alarm on and call light within reach.               Problem: PAIN - ADULT  Goal: Verbalizes/displays adequate comfort level or patient's stated pain goal  Description: INTERVENTIONS:  - Encourage pt to monitor pain and request assistance  - Assess pain using appropriate pain scale  - Administer analgesics based on type and severity of pain and evaluate response  - Implement non-pharmacological measures as appropriate and evaluate response  - Consider cultural and social influences on pain and pain management  - Manage/alleviate anxiety  - Utilize distraction and/or relaxation techniques  - Monitor for opioid side effects  - Notify MD/LIP if interventions unsuccessful or patient reports new pain  - Anticipate increased pain with activity and pre-medicate as appropriate  Outcome: Progressing     Problem: RESPIRATORY - ADULT  Goal: Achieves optimal ventilation and oxygenation  Description: INTERVENTIONS:  - Assess for changes in respiratory status  - Assess for changes in mentation and behavior  - Position to facilitate oxygenation and minimize respiratory effort  - Oxygen supplementation based on oxygen saturation or ABGs  - Provide Smoking Cessation handout, if applicable  - Encourage broncho-pulmonary hygiene including cough, deep breathe, Incentive Spirometry  - Assess the need for suctioning and perform as needed  - Assess and instruct to report SOB or any respiratory difficulty  - Respiratory Therapy support as indicated  - Manage/alleviate anxiety  - Monitor for signs/symptoms of CO2 retention  Outcome: Progressing     Problem: GASTROINTESTINAL - ADULT  Goal: Minimal or absence of nausea and vomiting  Description: INTERVENTIONS:  - Maintain adequate hydration with IV or PO as ordered and tolerated  - Nasogastric tube to low intermittent suction as ordered  - Evaluate effectiveness of ordered antiemetic medications  - Provide nonpharmacologic comfort measures as appropriate  - Advance diet as tolerated, if ordered  - Obtain nutritional consult as needed  - Evaluate fluid balance  Outcome: Progressing  Goal: Maintains adequate nutritional intake (undernourished)  Description: INTERVENTIONS:  - Monitor percentage of each meal consumed  - Identify factors contributing to decreased intake, treat as appropriate  - Assist with meals as needed  - Monitor I&O, WT and lab values  - Obtain nutritional consult as needed  - Optimize oral hygiene and moisture  - Encourage food from home; allow for food preferences  - Enhance eating environment  Outcome: Progressing  Goal: Achieves appropriate nutritional intake (bariatric)  Description: INTERVENTIONS:  - Monitor for over-consumption  - Identify factors contributing to increased intake, treat as appropriate  - Monitor I&O, WT and lab values  - Obtain nutritional consult as needed  - Evaluate psychosocial factors contributing to over-consumption  Outcome: Progressing     Problem: METABOLIC/FLUID AND ELECTROLYTES - ADULT  Goal: Electrolytes maintained within normal limits  Description: INTERVENTIONS:  - Monitor labs and rhythm and assess patient for signs and symptoms of electrolyte imbalances  - Administer electrolyte replacement as ordered  - Monitor response to electrolyte replacements, including rhythm and repeat lab results as appropriate  - Fluid restriction as ordered  - Instruct patient on fluid and nutrition restrictions as appropriate  Outcome: Progressing     Problem: ANXIETY  Goal: Will report anxiety at manageable levels  Description: INTERVENTIONS:  - Administer medication as ordered  - Teach and rehearse alternative coping skills  - Provide emotional support with 1:1 interaction with staff  Outcome: Progressing     Problem: COPING  Goal: Pt/Family able to verbalize concerns and demonstrate effective coping strategies  Description: INTERVENTIONS:  - Assist patient/family to identify coping skills, available support systems and cultural and spiritual values  - Provide emotional support, including active listening and acknowledgement of concerns of patient and caregivers  - Reduce environmental stimuli, as able  - Instruct patient/family in relaxation techniques, as appropriate  - Assess for spiritual and psychosocial needs and initiate Spiritual Care or Behavioral Health consult as needed  Outcome: Progressing

## 2022-03-25 NOTE — PLAN OF CARE
Pt received at 0730 resting in bed. A&Ox4. On 3L O2 nasal cannula, will wean as able. Reports sore throat and chest. Sinus tach on the monitor. Encouraged to call for assistance with ambulating. IV fluids as ordered. Oriented to room and call light. Updated on plan of care.       Problem: RESPIRATORY - ADULT  Goal: Achieves optimal ventilation and oxygenation  Description: INTERVENTIONS:  - Assess for changes in respiratory status  - Assess for changes in mentation and behavior  - Position to facilitate oxygenation and minimize respiratory effort  - Oxygen supplementation based on oxygen saturation or ABGs  - Provide Smoking Cessation handout, if applicable  - Encourage broncho-pulmonary hygiene including cough, deep breathe, Incentive Spirometry  - Assess the need for suctioning and perform as needed  - Assess and instruct to report SOB or any respiratory difficulty  - Respiratory Therapy support as indicated  - Manage/alleviate anxiety  - Monitor for signs/symptoms of CO2 retention  Outcome: Progressing     Problem: PAIN - ADULT  Goal: Verbalizes/displays adequate comfort level or patient's stated pain goal  Description: INTERVENTIONS:  - Encourage pt to monitor pain and request assistance  - Assess pain using appropriate pain scale  - Administer analgesics based on type and severity of pain and evaluate response  - Implement non-pharmacological measures as appropriate and evaluate response  - Consider cultural and social influences on pain and pain management  - Manage/alleviate anxiety  - Utilize distraction and/or relaxation techniques  - Monitor for opioid side effects  - Notify MD/LIP if interventions unsuccessful or patient reports new pain  - Anticipate increased pain with activity and pre-medicate as appropriate  Outcome: Progressing

## 2022-03-26 ENCOUNTER — PATIENT MESSAGE (OUTPATIENT)
Dept: FAMILY MEDICINE CLINIC | Facility: CLINIC | Age: 37
End: 2022-03-26

## 2022-03-26 VITALS
OXYGEN SATURATION: 100 % | DIASTOLIC BLOOD PRESSURE: 70 MMHG | WEIGHT: 207.69 LBS | RESPIRATION RATE: 18 BRPM | HEART RATE: 68 BPM | HEIGHT: 67 IN | BODY MASS INDEX: 32.6 KG/M2 | TEMPERATURE: 99 F | SYSTOLIC BLOOD PRESSURE: 119 MMHG

## 2022-03-26 PROBLEM — F43.10 PTSD (POST-TRAUMATIC STRESS DISORDER): Status: ACTIVE | Noted: 2019-07-01

## 2022-03-26 LAB — PHOSPHATE SERPL-MCNC: 1.6 MG/DL (ref 2.5–4.9)

## 2022-03-26 PROCEDURE — 84100 ASSAY OF PHOSPHORUS: CPT | Performed by: HOSPITALIST

## 2022-03-26 PROCEDURE — C9113 INJ PANTOPRAZOLE SODIUM, VIA: HCPCS | Performed by: HOSPITALIST

## 2022-03-26 PROCEDURE — 93010 ELECTROCARDIOGRAM REPORT: CPT | Performed by: INTERNAL MEDICINE

## 2022-03-26 PROCEDURE — 93005 ELECTROCARDIOGRAM TRACING: CPT

## 2022-03-26 NOTE — PROGRESS NOTES
[facility] [address]  [place], [address]  ?  03/26/22  ? Re: Elieser Singh  ? To Whom It May Concern:    Elieser Singh was admitted to [facility] from 3/24/2022 to 03/26/22. Please excuse Elieser Singh from attending work for these days. The patient may return to work on 3/27/22.  ?   Thank you,    Manuel Goins,   [facility]

## 2022-03-26 NOTE — PLAN OF CARE
Handed discharge instructions to the patient- pt denied letting me read her instructions. Handed paper back to me regarding psychiatry/psychology follow ups, this part of paper also had information on [facility] outpatient intensive anxiety program and poornima vasques number. Pt removed her IV's and discontinued her tele herself. Refused transport. Walked out by [employer].     Problem: PAIN - ADULT  Goal: Verbalizes/displays adequate comfort level or patient's stated pain goal  Description: INTERVENTIONS:  - Encourage pt to monitor pain and request assistance  - Assess pain using appropriate pain scale  - Administer analgesics based on type and severity of pain and evaluate response  - Implement non-pharmacological measures as appropriate and evaluate response  - Consider cultural and social influences on pain and pain management  - Manage/alleviate anxiety  - Utilize distraction and/or relaxation techniques  - Monitor for opioid side effects  - Notify MD/LIP if interventions unsuccessful or patient reports new pain  - Anticipate increased pain with activity and pre-medicate as appropriate  3/26/2022 1309 by Lashon Guillory RN  Outcome: Completed  3/26/2022 1309 by Lashon Guillory RN  Outcome: Progressing     Problem: RESPIRATORY - ADULT  Goal: Achieves optimal ventilation and oxygenation  Description: INTERVENTIONS:  - Assess for changes in respiratory status  - Assess for changes in mentation and behavior  - Position to facilitate oxygenation and minimize respiratory effort  - Oxygen supplementation based on oxygen saturation or ABGs  - Provide Smoking Cessation handout, if applicable  - Encourage broncho-pulmonary hygiene including cough, deep breathe, Incentive Spirometry  - Assess the need for suctioning and perform as needed  - Assess and instruct to report SOB or any respiratory difficulty  - Respiratory Therapy support as indicated  - Manage/alleviate anxiety  - Monitor for signs/symptoms of CO2 retention  3/26/2022 1309 by Vitaliy Arauz RN  Outcome: Completed  3/26/2022 1309 by Vitaliy Arauz RN  Outcome: Progressing     Problem: GASTROINTESTINAL - ADULT  Goal: Minimal or absence of nausea and vomiting  Description: INTERVENTIONS:  - Maintain adequate hydration with IV or PO as ordered and tolerated  - Nasogastric tube to low intermittent suction as ordered  - Evaluate effectiveness of ordered antiemetic medications  - Provide nonpharmacologic comfort measures as appropriate  - Advance diet as tolerated, if ordered  - Obtain nutritional consult as needed  - Evaluate fluid balance  3/26/2022 1309 by Vitaliy Arauz RN  Outcome: Completed  3/26/2022 1309 by Vitaliy Arauz RN  Outcome: Progressing  Goal: Maintains adequate nutritional intake (undernourished)  Description: INTERVENTIONS:  - Monitor percentage of each meal consumed  - Identify factors contributing to decreased intake, treat as appropriate  - Assist with meals as needed  - Monitor I&O, WT and lab values  - Obtain nutritional consult as needed  - Optimize oral hygiene and moisture  - Encourage food from home; allow for food preferences  - Enhance eating environment  3/26/2022 1309 by Vitaliy Arauz RN  Outcome: Completed  3/26/2022 1309 by Vitaliy Arauz RN  Outcome: Progressing  Goal: Achieves appropriate nutritional intake (bariatric)  Description: INTERVENTIONS:  - Monitor for over-consumption  - Identify factors contributing to increased intake, treat as appropriate  - Monitor I&O, WT and lab values  - Obtain nutritional consult as needed  - Evaluate psychosocial factors contributing to over-consumption  3/26/2022 1309 by Vitaliy Arauz RN  Outcome: Completed  3/26/2022 1309 by Vitaliy Arauz RN  Outcome: Progressing     Problem: METABOLIC/FLUID AND ELECTROLYTES - ADULT  Goal: Electrolytes maintained within normal limits  Description: INTERVENTIONS:  - Monitor labs and rhythm and assess patient for signs and symptoms of electrolyte imbalances  - Administer electrolyte replacement as ordered  - Monitor response to electrolyte replacements, including rhythm and repeat lab results as appropriate  - Fluid restriction as ordered  - Instruct patient on fluid and nutrition restrictions as appropriate  3/26/2022 1309 by Manny Vogel RN  Outcome: Completed  3/26/2022 1309 by Manny Vogel RN  Outcome: Progressing     Problem: ANXIETY  Goal: Will report anxiety at manageable levels  Description: INTERVENTIONS:  - Administer medication as ordered  - Teach and rehearse alternative coping skills  - Provide emotional support with 1:1 interaction with staff  3/26/2022 1309 by Manny Vogel RN  Outcome: Completed  3/26/2022 1309 by Manny Vogel RN  Outcome: Progressing     Problem: COPING  Goal: Pt/Family able to verbalize concerns and demonstrate effective coping strategies  Description: INTERVENTIONS:  - Assist patient/family to identify coping skills, available support systems and cultural and spiritual values  - Provide emotional support, including active listening and acknowledgement of concerns of patient and caregivers  - Reduce environmental stimuli, as able  - Instruct patient/family in relaxation techniques, as appropriate  - Assess for spiritual and psychosocial needs and initiate Spiritual Care or Behavioral Health consult as needed  3/26/2022 1309 by Manny Vogel RN  Outcome: Completed  3/26/2022 1309 by Manny Vogel RN  Outcome: Progressing     Problem: Patient/Family Goals  Goal: Patient/Family Long Term Goal  Description: Patient's Long Term Goal: to stay out of hospital    Interventions:  - take meds as prescribed, attend follow up appts, eat healthy/exercise  - See additional Care Plan goals for specific interventions     3/26/2022 1309 by Manny Vogel RN  Outcome: Completed  3/26/2022 1309 by Manny Vogel RN  Outcome: Progressing  Goal: Patient/Family Short Term Goal  Description: Patient's Short Term Goal: to go home    Interventions:   - follow medication regimen, therapeutic labs/procedures, walk TID  - See additional Care Plan goals for specific interventions     3/26/2022 1309 by Merry Muller RN  Outcome: Completed  3/26/2022 1309 by Merry Muller, RN  Outcome: Progressing

## 2022-03-26 NOTE — PLAN OF CARE
Assumed care of patient at [address]. Alert and oriented x4--very anxious and SOB at times, PRN medications given with good results, No C/O chest pain, SPO2 on 1L (for comfort, patient doesn't want it removed at this time) 100%, Heart rate SR 80s. Breath sounds clear. IVF per orders. 1 unit of RBCs finishing up at change of shift, repeat hemoglobin 8.7, V.S.S. Bed is locked and in low position. Personal belonging within reach. Updated on plan of care and verbalized understanding. Will continue to monitor.      Problem: PAIN - ADULT  Goal: Verbalizes/displays adequate comfort level or patient's stated pain goal  Description: INTERVENTIONS:  - Encourage pt to monitor pain and request assistance  - Assess pain using appropriate pain scale  - Administer analgesics based on type and severity of pain and evaluate response  - Implement non-pharmacological measures as appropriate and evaluate response  - Consider cultural and social influences on pain and pain management  - Manage/alleviate anxiety  - Utilize distraction and/or relaxation techniques  - Monitor for opioid side effects  - Notify MD/LIP if interventions unsuccessful or patient reports new pain  - Anticipate increased pain with activity and pre-medicate as appropriate  Outcome: Progressing     Problem: RESPIRATORY - ADULT  Goal: Achieves optimal ventilation and oxygenation  Description: INTERVENTIONS:  - Assess for changes in respiratory status  - Assess for changes in mentation and behavior  - Position to facilitate oxygenation and minimize respiratory effort  - Oxygen supplementation based on oxygen saturation or ABGs  - Provide Smoking Cessation handout, if applicable  - Encourage broncho-pulmonary hygiene including cough, deep breathe, Incentive Spirometry  - Assess the need for suctioning and perform as needed  - Assess and instruct to report SOB or any respiratory difficulty  - Respiratory Therapy support as indicated  - Manage/alleviate anxiety  - Monitor for signs/symptoms of CO2 retention  Outcome: Progressing     Problem: GASTROINTESTINAL - ADULT  Goal: Minimal or absence of nausea and vomiting  Description: INTERVENTIONS:  - Maintain adequate hydration with IV or PO as ordered and tolerated  - Nasogastric tube to low intermittent suction as ordered  - Evaluate effectiveness of ordered antiemetic medications  - Provide nonpharmacologic comfort measures as appropriate  - Advance diet as tolerated, if ordered  - Obtain nutritional consult as needed  - Evaluate fluid balance  Outcome: Progressing  Goal: Maintains adequate nutritional intake (undernourished)  Description: INTERVENTIONS:  - Monitor percentage of each meal consumed  - Identify factors contributing to decreased intake, treat as appropriate  - Assist with meals as needed  - Monitor I&O, WT and lab values  - Obtain nutritional consult as needed  - Optimize oral hygiene and moisture  - Encourage food from home; allow for food preferences  - Enhance eating environment  Outcome: Progressing  Goal: Achieves appropriate nutritional intake (bariatric)  Description: INTERVENTIONS:  - Monitor for over-consumption  - Identify factors contributing to increased intake, treat as appropriate  - Monitor I&O, WT and lab values  - Obtain nutritional consult as needed  - Evaluate psychosocial factors contributing to over-consumption  Outcome: Progressing     Problem: METABOLIC/FLUID AND ELECTROLYTES - ADULT  Goal: Electrolytes maintained within normal limits  Description: INTERVENTIONS:  - Monitor labs and rhythm and assess patient for signs and symptoms of electrolyte imbalances  - Administer electrolyte replacement as ordered  - Monitor response to electrolyte replacements, including rhythm and repeat lab results as appropriate  - Fluid restriction as ordered  - Instruct patient on fluid and nutrition restrictions as appropriate  Outcome: Progressing     Problem: ANXIETY  Goal: Will report anxiety at manageable levels  Description: INTERVENTIONS:  - Administer medication as ordered  - Teach and rehearse alternative coping skills  - Provide emotional support with 1:1 interaction with staff  Outcome: Progressing     Problem: COPING  Goal: Pt/Family able to verbalize concerns and demonstrate effective coping strategies  Description: INTERVENTIONS:  - Assist patient/family to identify coping skills, available support systems and cultural and spiritual values  - Provide emotional support, including active listening and acknowledgement of concerns of patient and caregivers  - Reduce environmental stimuli, as able  - Instruct patient/family in relaxation techniques, as appropriate  - Assess for spiritual and psychosocial needs and initiate Spiritual Care or Behavioral Health consult as needed  Outcome: Progressing

## 2022-03-26 NOTE — PLAN OF CARE
Assumed pt care at 0730. A&Ox4, anxious, poor insight/judgement. RA, denies chest pain. C/O BRIONES, lung sounds clear, VSS, NSR on tele. Voids. Up ad sotero. POC pain mgmt, dc today, POC updated with pt and family, questions answered, verbalized understanding. Will continue to monitor.       Problem: PAIN - ADULT  Goal: Verbalizes/displays adequate comfort level or patient's stated pain goal  Description: INTERVENTIONS:  - Encourage pt to monitor pain and request assistance  - Assess pain using appropriate pain scale  - Administer analgesics based on type and severity of pain and evaluate response  - Implement non-pharmacological measures as appropriate and evaluate response  - Consider cultural and social influences on pain and pain management  - Manage/alleviate anxiety  - Utilize distraction and/or relaxation techniques  - Monitor for opioid side effects  - Notify MD/LIP if interventions unsuccessful or patient reports new pain  - Anticipate increased pain with activity and pre-medicate as appropriate  Outcome: Progressing     Problem: RESPIRATORY - ADULT  Goal: Achieves optimal ventilation and oxygenation  Description: INTERVENTIONS:  - Assess for changes in respiratory status  - Assess for changes in mentation and behavior  - Position to facilitate oxygenation and minimize respiratory effort  - Oxygen supplementation based on oxygen saturation or ABGs  - Provide Smoking Cessation handout, if applicable  - Encourage broncho-pulmonary hygiene including cough, deep breathe, Incentive Spirometry  - Assess the need for suctioning and perform as needed  - Assess and instruct to report SOB or any respiratory difficulty  - Respiratory Therapy support as indicated  - Manage/alleviate anxiety  - Monitor for signs/symptoms of CO2 retention  Outcome: Progressing     Problem: GASTROINTESTINAL - ADULT  Goal: Minimal or absence of nausea and vomiting  Description: INTERVENTIONS:  - Maintain adequate hydration with IV or PO as ordered and tolerated  - Nasogastric tube to low intermittent suction as ordered  - Evaluate effectiveness of ordered antiemetic medications  - Provide nonpharmacologic comfort measures as appropriate  - Advance diet as tolerated, if ordered  - Obtain nutritional consult as needed  - Evaluate fluid balance  Outcome: Progressing  Goal: Maintains adequate nutritional intake (undernourished)  Description: INTERVENTIONS:  - Monitor percentage of each meal consumed  - Identify factors contributing to decreased intake, treat as appropriate  - Assist with meals as needed  - Monitor I&O, WT and lab values  - Obtain nutritional consult as needed  - Optimize oral hygiene and moisture  - Encourage food from home; allow for food preferences  - Enhance eating environment  Outcome: Progressing  Goal: Achieves appropriate nutritional intake (bariatric)  Description: INTERVENTIONS:  - Monitor for over-consumption  - Identify factors contributing to increased intake, treat as appropriate  - Monitor I&O, WT and lab values  - Obtain nutritional consult as needed  - Evaluate psychosocial factors contributing to over-consumption  Outcome: Progressing     Problem: METABOLIC/FLUID AND ELECTROLYTES - ADULT  Goal: Electrolytes maintained within normal limits  Description: INTERVENTIONS:  - Monitor labs and rhythm and assess patient for signs and symptoms of electrolyte imbalances  - Administer electrolyte replacement as ordered  - Monitor response to electrolyte replacements, including rhythm and repeat lab results as appropriate  - Fluid restriction as ordered  - Instruct patient on fluid and nutrition restrictions as appropriate  Outcome: Progressing     Problem: ANXIETY  Goal: Will report anxiety at manageable levels  Description: INTERVENTIONS:  - Administer medication as ordered  - Teach and rehearse alternative coping skills  - Provide emotional support with 1:1 interaction with staff  Outcome: Progressing     Problem: COPING  Goal: Pt/Family able to verbalize concerns and demonstrate effective coping strategies  Description: INTERVENTIONS:  - Assist patient/family to identify coping skills, available support systems and cultural and spiritual values  - Provide emotional support, including active listening and acknowledgement of concerns of patient and caregivers  - Reduce environmental stimuli, as able  - Instruct patient/family in relaxation techniques, as appropriate  - Assess for spiritual and psychosocial needs and initiate Spiritual Care or Behavioral Health consult as needed  Outcome: Progressing     Problem: Patient/Family Goals  Goal: Patient/Family Long Term Goal  Description: Patient's Long Term Goal: to stay out of hospital    Interventions:  - take meds as prescribed, attend follow up appts, eat healthy/exercise  - See additional Care Plan goals for specific interventions     Outcome: Progressing  Goal: Patient/Family Short Term Goal  Description: Patient's Short Term Goal: to go home    Interventions:   - follow medication regimen, therapeutic labs/procedures, walk TID  - See additional Care Plan goals for specific interventions     Outcome: Progressing

## 2022-03-27 LAB
BLOOD TYPE BARCODE: 6200
BLOOD TYPE BARCODE: 6200

## 2022-03-28 ENCOUNTER — PATIENT OUTREACH (OUTPATIENT)
Dept: CASE MANAGEMENT | Age: 37
End: 2022-03-28

## 2022-03-28 NOTE — TELEPHONE ENCOUNTER
From: Shiela Wilks  To: Huey Lam DO  Sent: 3/26/2022 11:44 AM CDT  Subject: Seen by Dr moreno    He saw me in the hospital and put a note in my chart stating my breathing issues are in my head and I am a poor judgment when it comes to making decisions. I want this removed from my chart as it is not correct. My breathing issues are from post Wojciech VALDES and I have made great strides in my progress and I honestly feel that my anxiety and depression are under control at this time.

## 2022-03-29 ENCOUNTER — LAB ENCOUNTER (OUTPATIENT)
Dept: LAB | Facility: HOSPITAL | Age: 37
End: 2022-03-29
Attending: HOSPITALIST
Payer: COMMERCIAL

## 2022-03-29 ENCOUNTER — APPOINTMENT (OUTPATIENT)
Dept: HEMATOLOGY/ONCOLOGY | Facility: HOSPITAL | Age: 37
End: 2022-03-29
Attending: INTERNAL MEDICINE
Payer: COMMERCIAL

## 2022-03-29 DIAGNOSIS — Z01.818 PREOP EXAMINATION: ICD-10-CM

## 2022-03-29 DIAGNOSIS — Z11.59 SCREENING FOR VIRAL DISEASE: ICD-10-CM

## 2022-03-29 LAB
ATRIAL RATE: 73 BPM
P AXIS: 46 DEGREES
P-R INTERVAL: 126 MS
Q-T INTERVAL: 394 MS
QRS DURATION: 90 MS
QTC CALCULATION (BEZET): 434 MS
R AXIS: 43 DEGREES
T AXIS: 45 DEGREES
VENTRICULAR RATE: 73 BPM
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
## 2022-03-30 PROBLEM — K59.00 CONSTIPATION: Status: ACTIVE | Noted: 2022-03-30

## 2022-03-30 LAB — SARS-COV-2 RNA RESP QL NAA+PROBE: NOT DETECTED

## 2022-04-01 ENCOUNTER — HOSPITAL ENCOUNTER (OUTPATIENT)
Dept: RESPIRATORY THERAPY | Facility: HOSPITAL | Age: 37
Discharge: HOME | End: 2022-04-01
Attending: HOSPITALIST
Payer: COMMERCIAL

## 2022-04-01 DIAGNOSIS — R06.09 OTHER FORM OF DYSPNEA: ICD-10-CM

## 2022-04-01 DIAGNOSIS — R09.02 HYPOXIA: ICD-10-CM

## 2022-04-01 PROBLEM — R06.00 DYSPNEA: Status: ACTIVE | Noted: 2022-03-24

## 2022-04-01 PROCEDURE — 94726 PLETHYSMOGRAPHY LUNG VOLUMES: CPT

## 2022-04-01 PROCEDURE — 94060 EVALUATION OF WHEEZING: CPT

## 2022-04-01 PROCEDURE — 94729 DIFFUSING CAPACITY: CPT

## 2022-04-04 ENCOUNTER — TELEPHONE (OUTPATIENT)
Dept: HEMATOLOGY/ONCOLOGY | Facility: HOSPITAL | Age: 37
End: 2022-04-04

## 2022-04-04 NOTE — TELEPHONE ENCOUNTER
Jaya Gonzales MD  P Edw Soledad Pillai Rns  This patent is scheduled to see me on Monday 4/11 at 11:15 for 15 minute appointment  Per Harriett Romero would like patient to come in at 12:15pm so he can spend additional time with her. Per Patient she will come in at 12:15pm as requested.

## 2022-04-05 ENCOUNTER — APPOINTMENT (OUTPATIENT)
Dept: HEMATOLOGY/ONCOLOGY | Facility: HOSPITAL | Age: 37
End: 2022-04-05
Attending: SPECIALIST
Payer: COMMERCIAL

## 2022-04-11 ENCOUNTER — OFFICE VISIT (OUTPATIENT)
Dept: HEMATOLOGY/ONCOLOGY | Facility: HOSPITAL | Age: 37
End: 2022-04-11
Attending: SPECIALIST
Payer: COMMERCIAL

## 2022-04-12 ENCOUNTER — APPOINTMENT (OUTPATIENT)
Dept: HEMATOLOGY/ONCOLOGY | Facility: HOSPITAL | Age: 37
End: 2022-04-12
Attending: SPECIALIST
Payer: COMMERCIAL

## 2022-05-05 DIAGNOSIS — K21.9 GASTROESOPHAGEAL REFLUX DISEASE WITHOUT ESOPHAGITIS: ICD-10-CM

## 2022-05-05 DIAGNOSIS — Z76.89 ENCOUNTER FOR WEIGHT MANAGEMENT: ICD-10-CM

## 2022-05-06 ENCOUNTER — PATIENT MESSAGE (OUTPATIENT)
Dept: FAMILY MEDICINE CLINIC | Facility: CLINIC | Age: 37
End: 2022-05-06

## 2022-05-06 NOTE — TELEPHONE ENCOUNTER
Rx Request  Phentermine HCl 37.5 MG Oral Tab   Disp:   30                 R: 0   Last Refilled: 02/25/2022  Omeprazole 40 MG Oral Capsule Delayed Release   Disp:   30                 R: 2 Last  Refilled: 11/08/2021    Associated Dx: Weight Management    Last Visit: 01/20/2022    Protocol Passed?  Yes[  ]       No[ x ]

## 2022-05-07 RX ORDER — PHENTERMINE HYDROCHLORIDE 37.5 MG/1
37.5 TABLET ORAL
Qty: 30 TABLET | Refills: 0 | OUTPATIENT
Start: 2022-05-07

## 2022-05-07 RX ORDER — OMEPRAZOLE 40 MG/1
40 CAPSULE, DELAYED RELEASE ORAL DAILY
Qty: 30 CAPSULE | Refills: 2 | OUTPATIENT
Start: 2022-05-07

## 2022-05-07 RX ORDER — NORETHINDRONE ACETATE/ETHINYL ESTRADIOL AND FERROUS FUMARATE 1MG-20(21)
KIT ORAL
Qty: 84 TABLET | Refills: 0 | Status: SHIPPED | OUTPATIENT
Start: 2022-05-07

## 2022-05-07 RX ORDER — OMEPRAZOLE 40 MG/1
CAPSULE, DELAYED RELEASE ORAL
Qty: 30 CAPSULE | Refills: 0 | Status: SHIPPED | OUTPATIENT
Start: 2022-05-07

## 2022-05-09 NOTE — TELEPHONE ENCOUNTER
From: Alicia Vail  To: Thaddeus Rhoades DO  Sent: 5/6/2022 10:56 AM CDT  Subject: Re    I need a new referral for Dr Lowell Slade at [facility]. I have an appt with him on the 16th of May. Also I sent in a refill request for a few medications. I also need a new script for my zofran and topamax. Can you send those in to the pharmacy?

## 2022-05-10 RX ORDER — ONDANSETRON 4 MG/1
4 TABLET, FILM COATED ORAL EVERY 8 HOURS PRN
Qty: 30 TABLET | Refills: 0 | Status: SHIPPED | OUTPATIENT
Start: 2022-05-10

## 2022-05-13 ENCOUNTER — PATIENT MESSAGE (OUTPATIENT)
Dept: FAMILY MEDICINE CLINIC | Facility: CLINIC | Age: 37
End: 2022-05-13

## 2022-05-13 DIAGNOSIS — Z76.89 ENCOUNTER FOR WEIGHT MANAGEMENT: ICD-10-CM

## 2022-05-13 RX ORDER — PHENTERMINE HYDROCHLORIDE 37.5 MG/1
37.5 TABLET ORAL
Qty: 30 TABLET | Refills: 0 | OUTPATIENT
Start: 2022-05-13

## 2022-05-13 NOTE — TELEPHONE ENCOUNTER
From: Mariaa Brewer  To: Latia Little DO  Sent: 5/13/2022 10:43 AM CDT  Subject: Refill    I need a refill on my phentramine. Can you send it to my pharmacy?

## 2022-05-16 ENCOUNTER — OFFICE VISIT (OUTPATIENT)
Dept: HEMATOLOGY/ONCOLOGY | Facility: HOSPITAL | Age: 37
End: 2022-05-16
Attending: SPECIALIST
Payer: COMMERCIAL

## 2022-05-16 VITALS
TEMPERATURE: 98 F | DIASTOLIC BLOOD PRESSURE: 82 MMHG | HEART RATE: 100 BPM | SYSTOLIC BLOOD PRESSURE: 134 MMHG | RESPIRATION RATE: 16 BRPM | OXYGEN SATURATION: 100 %

## 2022-05-16 DIAGNOSIS — D50.0 IRON DEFICIENCY ANEMIA DUE TO CHRONIC BLOOD LOSS: Primary | ICD-10-CM

## 2022-05-16 DIAGNOSIS — I26.99 ACUTE PULMONARY EMBOLISM WITHOUT ACUTE COR PULMONALE, UNSPECIFIED PULMONARY EMBOLISM TYPE (HCC): ICD-10-CM

## 2022-05-16 PROCEDURE — 99214 OFFICE O/P EST MOD 30 MIN: CPT | Performed by: SPECIALIST

## 2022-05-17 NOTE — TELEPHONE ENCOUNTER
This was supposed to be temporary until she goes back to weight loss clinic - otherwise f/u appts needed here q 3 mo to stay on this rx

## 2022-05-23 ENCOUNTER — OFFICE VISIT (OUTPATIENT)
Dept: FAMILY MEDICINE CLINIC | Facility: CLINIC | Age: 37
End: 2022-05-23
Payer: COMMERCIAL

## 2022-05-23 ENCOUNTER — LAB ENCOUNTER (OUTPATIENT)
Dept: LAB | Age: 37
End: 2022-05-23
Attending: PHYSICIAN ASSISTANT
Payer: COMMERCIAL

## 2022-05-23 VITALS
RESPIRATION RATE: 18 BRPM | BODY MASS INDEX: 32.65 KG/M2 | HEIGHT: 67 IN | WEIGHT: 208 LBS | HEART RATE: 78 BPM | SYSTOLIC BLOOD PRESSURE: 110 MMHG | DIASTOLIC BLOOD PRESSURE: 74 MMHG

## 2022-05-23 DIAGNOSIS — Z30.41 ENCOUNTER FOR BIRTH CONTROL PILLS MAINTENANCE: ICD-10-CM

## 2022-05-23 DIAGNOSIS — D50.0 IRON DEFICIENCY ANEMIA DUE TO CHRONIC BLOOD LOSS: ICD-10-CM

## 2022-05-23 DIAGNOSIS — Z76.89 ENCOUNTER FOR WEIGHT MANAGEMENT: Primary | ICD-10-CM

## 2022-05-23 LAB
BASOPHILS # BLD AUTO: 0.03 X10(3) UL (ref 0–0.2)
BASOPHILS NFR BLD AUTO: 0.3 %
DEPRECATED HBV CORE AB SER IA-ACNC: 7.9 NG/ML
EOSINOPHIL # BLD AUTO: 0.15 X10(3) UL (ref 0–0.7)
EOSINOPHIL NFR BLD AUTO: 1.7 %
ERYTHROCYTE [DISTWIDTH] IN BLOOD BY AUTOMATED COUNT: 18.4 %
HCT VFR BLD AUTO: 30.1 %
HGB BLD-MCNC: 9.5 G/DL
IMM GRANULOCYTES # BLD AUTO: 0.02 X10(3) UL (ref 0–1)
IMM GRANULOCYTES NFR BLD: 0.2 %
IRON SATN MFR SERPL: 11 %
IRON SERPL-MCNC: 53 UG/DL
LYMPHOCYTES # BLD AUTO: 0.94 X10(3) UL (ref 1–4)
LYMPHOCYTES NFR BLD AUTO: 10.9 %
MCH RBC QN AUTO: 27.1 PG (ref 26–34)
MCHC RBC AUTO-ENTMCNC: 31.6 G/DL (ref 31–37)
MCV RBC AUTO: 86 FL
MONOCYTES # BLD AUTO: 0.43 X10(3) UL (ref 0.1–1)
MONOCYTES NFR BLD AUTO: 5 %
NEUTROPHILS # BLD AUTO: 7.05 X10 (3) UL (ref 1.5–7.7)
NEUTROPHILS # BLD AUTO: 7.05 X10(3) UL (ref 1.5–7.7)
NEUTROPHILS NFR BLD AUTO: 81.9 %
PLATELET # BLD AUTO: 269 10(3)UL (ref 150–450)
RBC # BLD AUTO: 3.5 X10(6)UL
TIBC SERPL-MCNC: 466 UG/DL (ref 240–450)
TRANSFERRIN SERPL-MCNC: 313 MG/DL (ref 200–360)
WBC # BLD AUTO: 8.6 X10(3) UL (ref 4–11)

## 2022-05-23 PROCEDURE — 36415 COLL VENOUS BLD VENIPUNCTURE: CPT

## 2022-05-23 PROCEDURE — 85025 COMPLETE CBC W/AUTO DIFF WBC: CPT

## 2022-05-23 PROCEDURE — 83550 IRON BINDING TEST: CPT

## 2022-05-23 PROCEDURE — 82728 ASSAY OF FERRITIN: CPT

## 2022-05-23 PROCEDURE — 83540 ASSAY OF IRON: CPT

## 2022-05-23 RX ORDER — DROSPIRENONE AND ETHINYL ESTRADIOL 0.03MG-3MG
1 KIT ORAL DAILY
Qty: 90 TABLET | Refills: 0 | Status: SHIPPED | OUTPATIENT
Start: 2022-05-23

## 2022-05-23 RX ORDER — PHENTERMINE HYDROCHLORIDE 37.5 MG/1
37.5 TABLET ORAL
Qty: 30 TABLET | Refills: 2 | Status: SHIPPED | OUTPATIENT
Start: 2022-05-23

## 2022-05-24 ENCOUNTER — TELEPHONE (OUTPATIENT)
Dept: FAMILY MEDICINE CLINIC | Facility: CLINIC | Age: 37
End: 2022-05-24

## 2022-05-24 ENCOUNTER — OFFICE VISIT (OUTPATIENT)
Dept: ELECTROPHYSIOLOGY | Facility: HOSPITAL | Age: 37
End: 2022-05-24
Attending: FAMILY MEDICINE
Payer: COMMERCIAL

## 2022-05-24 DIAGNOSIS — M79.645 PAIN OF LEFT THUMB: ICD-10-CM

## 2022-05-24 DIAGNOSIS — S41.112A LACERATION OF LEFT UPPER EXTREMITY, INITIAL ENCOUNTER: ICD-10-CM

## 2022-05-24 DIAGNOSIS — G56.02 LEFT CARPAL TUNNEL SYNDROME: ICD-10-CM

## 2022-05-24 PROCEDURE — 95912 NRV CNDJ TEST 11-12 STUDIES: CPT | Performed by: OTHER

## 2022-05-24 NOTE — PROCEDURES
[facility]  Nerve Conduction & EMG Report                      [address], [address]   EMG department   [facility] [address]  [place] S[place] [facility]  [phone]          Patient name: Geoff Leon  YOB: 1985  Referring provider: Dr. Eugene Tomlinson  Reason for study: Evaluate for carpal tunnel  Brief history: 43year old female with history of left carpal tunnel syndrome, with several month history of numbness and tingling in the left wrist and first lateral 3 digits, as well as recent worsening in the last few months, after a wrist laceration injury. Sensory and motor nerve conduction studies, and needle EMG:  Please see separate sheet for waveforms and quantitative nerve conduction data, as well as for tabulated form of electromyographic data. Summary:   1. The left median sensory response had borderline prolonged distal latency. Amplitude was normal.  A palmar ulnar to median sensory comparison study was performed, which showed a significant difference in latency, consistent with a delayed median sensory response. 2.  The the right median sensory response was normal.  3.  The bilateral ulnar and left radial sensory responses were normal.  4.  The left median motor response had decreased amplitude. Distal latency was normal, as was conduction velocity. 5.  The right median motor response was normal.  6.  The bilateral ulnar motor responses were normal.  7.  Needle EMG using a concentric needle was performed on selected muscles of the left upper extremity. The left opponens pollicis had positive sharp waves and fibrillations. All other muscles tested were normal.    Conclusion:  1.   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  It is atypical in that there is very minimal sensory involvement (standardly measured at digit 2), and significant motor involvement, with denervation seen in the APB and opponens muscles. 2.  There is no evidence of a cervical radiculopathy.       Carlos Flores, DO  Neurology and Neuromuscular medicine  [employer]

## 2022-06-30 ENCOUNTER — OFFICE VISIT (OUTPATIENT)
Dept: ORTHOPEDICS CLINIC | Facility: CLINIC | Age: 37
End: 2022-06-30
Payer: COMMERCIAL

## 2022-06-30 VITALS — BODY MASS INDEX: 32.49 KG/M2 | WEIGHT: 207 LBS | HEIGHT: 67 IN

## 2022-06-30 DIAGNOSIS — S64.10XA: Primary | ICD-10-CM

## 2022-06-30 DIAGNOSIS — G56.02 CARPAL TUNNEL SYNDROME OF LEFT WRIST: ICD-10-CM

## 2022-06-30 PROCEDURE — 3008F BODY MASS INDEX DOCD: CPT | Performed by: ORTHOPAEDIC SURGERY

## 2022-06-30 PROCEDURE — 99214 OFFICE O/P EST MOD 30 MIN: CPT | Performed by: ORTHOPAEDIC SURGERY

## 2022-07-11 ENCOUNTER — TELEPHONE (OUTPATIENT)
Dept: ORTHOPEDICS CLINIC | Facility: CLINIC | Age: 37
End: 2022-07-11

## 2022-07-11 NOTE — TELEPHONE ENCOUNTER
Patient states she is supposed to have surgery on 7/13 but the hospital has no information on it. Please advise.

## 2022-07-13 ENCOUNTER — TELEPHONE (OUTPATIENT)
Dept: ORTHOPEDICS CLINIC | Facility: CLINIC | Age: 37
End: 2022-07-13

## 2022-07-13 NOTE — PROGRESS NOTES
[facility]   Name: Mariaa Brewer  MRN: [id number]   : 10/15/1985  Surgical Consent:  Left carpal tunnel release with possible palmar cutaneous branch repair    Diagnosis:  [x] Closed injury of palmar cutaneous branch of left median nerve [S64.10XA]  [x]  Left carpal tunnel syndrome [G56.02  Laterality:   Left    Procedure Codes:  Carpal Tunnel Release Open (83511) and Digital Nerve Repair one nerve (57956)    Disposition:  Outpatient    Operative Time:  2 hrs    Antibiotics:  Ancef    Anesthesia Type:  Regional    Clearance:   NONE    Equipment:  [place] Blade, Lead Hand, Operative Microscope or OTHER: Axogen nerve allograft    Positioning:  Supine with arm table on transport cart    Assistant request:   Assistant: Yes, Iggy Hines PA-C    Anesthesiologist Request:   None    Follow Up    1 week post op with Iggy Hines    Other:     Do not shave or clip arm hair

## 2022-07-13 NOTE — TELEPHONE ENCOUNTER
Surgeon: Dr. Linda Hudson    Date of Surgery:         Post Op Appt:     Prior Authorization:   Inpatient or Outpatient: Outpatient  Facility: [facility]  Work Comp: NO  CPT: 17074,43701  DX:  [id number]    Surgical Assistant: Sunni Marinelli     Preadmission Testing: PER ANESTHESIA GUIDELINES     Pre-Op Clearance Requested: NONE    DME:  NONE NEEDED    Rehab Services Ordered: NONE     Disability Paperwork: NONE    On [place] Calendar: YES    Notes: LT CTR, DIGITAL NERVE REPAIR ONE NERVE.

## 2022-08-01 ENCOUNTER — TELEPHONE (OUTPATIENT)
Dept: ORTHOPEDICS CLINIC | Facility: CLINIC | Age: 37
End: 2022-08-01

## 2022-08-01 ENCOUNTER — OFFICE VISIT (OUTPATIENT)
Dept: FAMILY MEDICINE CLINIC | Facility: CLINIC | Age: 37
End: 2022-08-01
Payer: COMMERCIAL

## 2022-08-01 VITALS
DIASTOLIC BLOOD PRESSURE: 64 MMHG | HEART RATE: 92 BPM | SYSTOLIC BLOOD PRESSURE: 116 MMHG | RESPIRATION RATE: 16 BRPM | OXYGEN SATURATION: 100 %

## 2022-08-01 DIAGNOSIS — S61.519A SELF-CUTTING OF WRIST (HCC): ICD-10-CM
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
## 2022-08-01 DIAGNOSIS — M79.604 PAIN OF RIGHT LOWER EXTREMITY: Primary | ICD-10-CM

## 2022-08-01 DIAGNOSIS — M54.16 CHRONIC LUMBAR RADICULOPATHY: ICD-10-CM

## 2022-08-01 DIAGNOSIS — S61.512A LACERATION OF LEFT WRIST, INITIAL ENCOUNTER: ICD-10-CM

## 2022-08-01 PROCEDURE — 3074F SYST BP LT 130 MM HG: CPT | Performed by: FAMILY MEDICINE

## 2022-08-01 PROCEDURE — 99214 OFFICE O/P EST MOD 30 MIN: CPT | Performed by: FAMILY MEDICINE

## 2022-08-01 PROCEDURE — 3078F DIAST BP <80 MM HG: CPT | Performed by: FAMILY MEDICINE

## 2022-08-01 PROCEDURE — 3008F BODY MASS INDEX DOCD: CPT | Performed by: FAMILY MEDICINE

## 2022-08-01 RX ORDER — PREGABALIN 25 MG/1
25 CAPSULE ORAL 2 TIMES DAILY
Qty: 60 CAPSULE | Refills: 1 | Status: SHIPPED | OUTPATIENT
Start: 2022-08-01

## 2022-08-01 RX ORDER — METAXALONE 800 MG/1
400 TABLET ORAL 3 TIMES DAILY
Qty: 30 TABLET | Refills: 1 | Status: SHIPPED | OUTPATIENT
Start: 2022-08-01

## 2022-08-01 NOTE — TELEPHONE ENCOUNTER
Spoke with patient who stated she has been trying to schedule her surgery for weeks, but has not been in touch with the schedulers. Would like to get the surgery scheduled ASAP with Dr. Greyson Mg. She was notified that this message would be sent to them, and a myChart was requested with their line as well. Pt verbalized understanding. No further questions at this time.

## 2022-08-01 NOTE — TELEPHONE ENCOUNTER
Patient is requesting to speak with an RN. I asked what it was in regards to, to direct her to the correct person. Patient stated \"surgery\" but does not wish to speak with the  and refused to elaborate further.

## 2022-08-04 ENCOUNTER — TELEPHONE (OUTPATIENT)
Dept: HEMATOLOGY/ONCOLOGY | Facility: HOSPITAL | Age: 37
End: 2022-08-04

## 2022-08-04 NOTE — TELEPHONE ENCOUNTER
Returned call to patient - per Johnnie Siegel can see APN regarding iron infusion and right leg pain swelling - patient is on Eliquis. Patient agreed to APN visit. For iron infusion and right leg pain. Transferred to [employer].

## 2022-08-04 NOTE — TELEPHONE ENCOUNTER
Patient calling and stated she has had swelling in her right leg ever since the Eliquis was decreased.     Asking for a return call

## 2022-08-05 PROBLEM — D50.8 IRON DEFICIENCY ANEMIA REFRACTORY TO IRON THERAPY: Status: ACTIVE | Noted: 2022-08-05

## 2022-08-05 PROBLEM — D50.0 IRON DEFICIENCY ANEMIA SECONDARY TO BLOOD LOSS (CHRONIC): Status: ACTIVE | Noted: 2022-08-05

## 2022-08-08 ENCOUNTER — APPOINTMENT (OUTPATIENT)
Dept: HEMATOLOGY/ONCOLOGY | Facility: HOSPITAL | Age: 37
End: 2022-08-08
Payer: COMMERCIAL

## 2022-08-08 ENCOUNTER — OFFICE VISIT (OUTPATIENT)
Dept: HEMATOLOGY/ONCOLOGY | Facility: HOSPITAL | Age: 37
End: 2022-08-08
Attending: SPECIALIST
Payer: COMMERCIAL

## 2022-08-08 ENCOUNTER — APPOINTMENT (OUTPATIENT)
Dept: HEMATOLOGY/ONCOLOGY | Facility: HOSPITAL | Age: 37
End: 2022-08-08
Attending: CLINICAL NURSE SPECIALIST
Payer: COMMERCIAL

## 2022-08-08 VITALS
RESPIRATION RATE: 16 BRPM | SYSTOLIC BLOOD PRESSURE: 115 MMHG | DIASTOLIC BLOOD PRESSURE: 75 MMHG | OXYGEN SATURATION: 100 % | TEMPERATURE: 97 F | HEART RATE: 84 BPM

## 2022-08-08 VITALS — DIASTOLIC BLOOD PRESSURE: 59 MMHG | SYSTOLIC BLOOD PRESSURE: 108 MMHG | HEART RATE: 73 BPM

## 2022-08-08 DIAGNOSIS — Z79.01 CURRENT USE OF LONG TERM ANTICOAGULATION: ICD-10-CM

## 2022-08-08 DIAGNOSIS — Z86.711 HISTORY OF PULMONARY EMBOLISM: ICD-10-CM

## 2022-08-08 DIAGNOSIS — D50.8 IRON DEFICIENCY ANEMIA REFRACTORY TO IRON THERAPY: ICD-10-CM

## 2022-08-08 DIAGNOSIS — M79.605 BILATERAL LEG PAIN: ICD-10-CM

## 2022-08-08 DIAGNOSIS — D50.0 IRON DEFICIENCY ANEMIA SECONDARY TO BLOOD LOSS (CHRONIC): Primary | ICD-10-CM

## 2022-08-08 DIAGNOSIS — D50.0 IRON DEFICIENCY ANEMIA DUE TO CHRONIC BLOOD LOSS: Primary | ICD-10-CM

## 2022-08-08 DIAGNOSIS — M79.604 BILATERAL LEG PAIN: ICD-10-CM

## 2022-08-08 LAB
ANION GAP SERPL CALC-SCNC: 6 MMOL/L (ref 0–18)
BASOPHILS # BLD AUTO: 0.04 X10(3) UL (ref 0–0.2)
BASOPHILS NFR BLD AUTO: 0.6 %
BUN BLD-MCNC: 15 MG/DL (ref 7–18)
CALCIUM BLD-MCNC: 8.7 MG/DL (ref 8.5–10.1)
CHLORIDE SERPL-SCNC: 114 MMOL/L (ref 98–112)
CO2 SERPL-SCNC: 19 MMOL/L (ref 21–32)
CREAT BLD-MCNC: 0.87 MG/DL
DEPRECATED HBV CORE AB SER IA-ACNC: 2.6 NG/ML
EOSINOPHIL # BLD AUTO: 0.29 X10(3) UL (ref 0–0.7)
EOSINOPHIL NFR BLD AUTO: 4.6 %
ERYTHROCYTE [DISTWIDTH] IN BLOOD BY AUTOMATED COUNT: 16.6 %
GFR SERPLBLD BASED ON 1.73 SQ M-ARVRAT: 87 ML/MIN/1.73M2 (ref 60–?)
GLUCOSE BLD-MCNC: 101 MG/DL (ref 70–99)
HCT VFR BLD AUTO: 26.9 %
HGB BLD-MCNC: 7.7 G/DL
IMM GRANULOCYTES # BLD AUTO: 0.02 X10(3) UL (ref 0–1)
IMM GRANULOCYTES NFR BLD: 0.3 %
IRON SATN MFR SERPL: 4 %
IRON SERPL-MCNC: 24 UG/DL
LYMPHOCYTES # BLD AUTO: 1.54 X10(3) UL (ref 1–4)
LYMPHOCYTES NFR BLD AUTO: 24.4 %
MCH RBC QN AUTO: 21.8 PG (ref 26–34)
MCHC RBC AUTO-ENTMCNC: 28.6 G/DL (ref 31–37)
MCV RBC AUTO: 76 FL
MONOCYTES # BLD AUTO: 0.46 X10(3) UL (ref 0.1–1)
MONOCYTES NFR BLD AUTO: 7.3 %
NEUTROPHILS # BLD AUTO: 3.96 X10 (3) UL (ref 1.5–7.7)
NEUTROPHILS # BLD AUTO: 3.96 X10(3) UL (ref 1.5–7.7)
NEUTROPHILS NFR BLD AUTO: 62.8 %
OSMOLALITY SERPL CALC.SUM OF ELEC: 289 MOSM/KG (ref 275–295)
PLATELET # BLD AUTO: 258 10(3)UL (ref 150–450)
POTASSIUM SERPL-SCNC: 4.4 MMOL/L (ref 3.5–5.1)
RBC # BLD AUTO: 3.54 X10(6)UL
SODIUM SERPL-SCNC: 139 MMOL/L (ref 136–145)
TIBC SERPL-MCNC: 548 UG/DL (ref 240–450)
TRANSFERRIN SERPL-MCNC: 368 MG/DL (ref 200–360)
WBC # BLD AUTO: 6.3 X10(3) UL (ref 4–11)

## 2022-08-08 PROCEDURE — 96374 THER/PROPH/DIAG INJ IV PUSH: CPT

## 2022-08-08 PROCEDURE — 99215 OFFICE O/P EST HI 40 MIN: CPT | Performed by: CLINICAL NURSE SPECIALIST

## 2022-08-08 NOTE — PROGRESS NOTES
Patient presents with: Follow - Up: APN assessment    Pt is here for follow up and labs for iron deficient anemia. She reports constant fatigue; not sleeping well due to pain in legs; R>L. She stays hydrated and follows a strict diet; occasional nausea but no vomiting; takes medications for stool management. Denies black, tarry stools; has history of heavy menses. Takes aleve for pain control.      Education Record    Learner:  Patient    Disease / Diagnosis: iron deficient anemia    Barriers / Limitations:  None   Comments:    Method:  Brief focused   Comments:    General Topics:  Diet, Medication, Pain, Side effects and symptom management and Plan of care reviewed   Comments:    Outcome:  Shows understanding   Comments:

## 2022-08-08 NOTE — PROGRESS NOTES
Education Record    Learner:  Patient    Disease / Diagnosis: Ferkatherineeme    Barriers / Limitations:  None   Comments:    Method:  Brief focused and Reinforcement   Comments:    General Topics:  Diet, Medication, Side effects and symptom management and Plan of care reviewed   Comments:    Outcome:  Shows understanding   Comments: Feraheme infused without any complications. Observed for half hour after infusion. Vital signs taken at the end of the 30-minute observation. Patient denied any complaints/issues. Discharged in good condition. Advised to call for any questions/concerns/issues.

## 2022-08-09 ENCOUNTER — TELEPHONE (OUTPATIENT)
Dept: ORTHOPEDICS CLINIC | Facility: CLINIC | Age: 37
End: 2022-08-09

## 2022-08-09 DIAGNOSIS — G56.02 CARPAL TUNNEL SYNDROME ON LEFT: ICD-10-CM

## 2022-08-09 DIAGNOSIS — S64.10XA: Primary | ICD-10-CM

## 2022-08-09 NOTE — TELEPHONE ENCOUNTER
Change OP CPT to [id number] (from [phone])   No prior auth or pre-cert required for OP CPT CODE 40033 and 00308

## 2022-08-15 ENCOUNTER — OFFICE VISIT (OUTPATIENT)
Dept: HEMATOLOGY/ONCOLOGY | Facility: HOSPITAL | Age: 37
End: 2022-08-15
Attending: SPECIALIST
Payer: COMMERCIAL

## 2022-08-15 VITALS — DIASTOLIC BLOOD PRESSURE: 55 MMHG | HEART RATE: 75 BPM | SYSTOLIC BLOOD PRESSURE: 103 MMHG

## 2022-08-15 VITALS
SYSTOLIC BLOOD PRESSURE: 107 MMHG | RESPIRATION RATE: 16 BRPM | DIASTOLIC BLOOD PRESSURE: 69 MMHG | TEMPERATURE: 97 F | HEART RATE: 96 BPM | OXYGEN SATURATION: 99 %

## 2022-08-15 DIAGNOSIS — D50.0 IRON DEFICIENCY ANEMIA DUE TO CHRONIC BLOOD LOSS: Primary | ICD-10-CM

## 2022-08-15 DIAGNOSIS — D50.0 IRON DEFICIENCY ANEMIA SECONDARY TO BLOOD LOSS (CHRONIC): Primary | ICD-10-CM

## 2022-08-15 DIAGNOSIS — R11.0 NAUSEA: ICD-10-CM

## 2022-08-15 DIAGNOSIS — Z86.711 HISTORY OF PULMONARY EMBOLISM: ICD-10-CM

## 2022-08-15 DIAGNOSIS — M79.605 BILATERAL LEG PAIN: ICD-10-CM

## 2022-08-15 DIAGNOSIS — Z79.01 CHRONIC ANTICOAGULATION: ICD-10-CM

## 2022-08-15 DIAGNOSIS — M79.604 BILATERAL LEG PAIN: ICD-10-CM

## 2022-08-15 DIAGNOSIS — D50.8 IRON DEFICIENCY ANEMIA REFRACTORY TO IRON THERAPY: ICD-10-CM

## 2022-08-15 LAB
BASOPHILS # BLD AUTO: 0.03 X10(3) UL (ref 0–0.2)
BASOPHILS NFR BLD AUTO: 0.5 %
EOSINOPHIL # BLD AUTO: 0.28 X10(3) UL (ref 0–0.7)
EOSINOPHIL NFR BLD AUTO: 4.4 %
ERYTHROCYTE [DISTWIDTH] IN BLOOD BY AUTOMATED COUNT: 22.8 %
HCT VFR BLD AUTO: 27.5 %
HGB BLD-MCNC: 7.9 G/DL
IMM GRANULOCYTES # BLD AUTO: 0.02 X10(3) UL (ref 0–1)
IMM GRANULOCYTES NFR BLD: 0.3 %
LYMPHOCYTES # BLD AUTO: 1.06 X10(3) UL (ref 1–4)
LYMPHOCYTES NFR BLD AUTO: 16.6 %
MCH RBC QN AUTO: 23.2 PG (ref 26–34)
MCHC RBC AUTO-ENTMCNC: 28.7 G/DL (ref 31–37)
MCV RBC AUTO: 80.9 FL
MONOCYTES # BLD AUTO: 0.32 X10(3) UL (ref 0.1–1)
MONOCYTES NFR BLD AUTO: 5 %
NEUTROPHILS # BLD AUTO: 4.66 X10 (3) UL (ref 1.5–7.7)
NEUTROPHILS # BLD AUTO: 4.66 X10(3) UL (ref 1.5–7.7)
NEUTROPHILS NFR BLD AUTO: 73.2 %
PLATELET # BLD AUTO: 247 10(3)UL (ref 150–450)
PLATELET MORPHOLOGY: NORMAL
RBC # BLD AUTO: 3.4 X10(6)UL
WBC # BLD AUTO: 6.4 X10(3) UL (ref 4–11)

## 2022-08-15 PROCEDURE — 99214 OFFICE O/P EST MOD 30 MIN: CPT | Performed by: CLINICAL NURSE SPECIALIST

## 2022-08-15 PROCEDURE — 96374 THER/PROPH/DIAG INJ IV PUSH: CPT

## 2022-08-15 PROCEDURE — 96375 TX/PRO/DX INJ NEW DRUG ADDON: CPT

## 2022-08-15 RX ORDER — PROCHLORPERAZINE MALEATE 10 MG
10 TABLET ORAL EVERY 6 HOURS PRN
Qty: 30 TABLET | Refills: 3 | Status: SHIPPED | OUTPATIENT
Start: 2022-08-15

## 2022-08-15 NOTE — PROGRESS NOTES
Education Record    Learner:  Patient    Disease / [place] Amita deficiency anemia due to chronic blood loss    Barriers / Limitations:  None   Comments:    Method:  Brief focused   Comments:    General Topics:  Infection, Medication, Pain, Precautions, Procedure, Side effects and symptom management, Plan of care reviewed and Fall risk and prevention   Comments:    Outcome:  Shows understanding   Comments:    Patient premedicated with Zofran prior to [facility] and she tolerated infusion well. Observed for 30 min afterwards .  New AVS given

## 2022-08-17 ENCOUNTER — LAB ENCOUNTER (OUTPATIENT)
Dept: LAB | Age: 37
End: 2022-08-17
Attending: ORTHOPAEDIC SURGERY
Payer: COMMERCIAL

## 2022-08-17 DIAGNOSIS — Z20.822 ENCOUNTER FOR PREOPERATIVE SCREENING LABORATORY TESTING FOR COVID-19 VIRUS: ICD-10-CM

## 2022-08-17 DIAGNOSIS — Z01.812 ENCOUNTER FOR PREOPERATIVE SCREENING LABORATORY TESTING FOR COVID-19 VIRUS: ICD-10-CM

## 2022-08-17 LAB — SARS-COV-2 RNA RESP QL NAA+PROBE: NOT DETECTED

## 2022-08-19 ENCOUNTER — ANESTHESIA (OUTPATIENT)
Dept: SURGERY | Facility: HOSPITAL | Age: 37
End: 2022-08-19
Payer: COMMERCIAL

## 2022-08-19 ENCOUNTER — HOSPITAL ENCOUNTER (OUTPATIENT)
Facility: HOSPITAL | Age: 37
Setting detail: HOSPITAL OUTPATIENT SURGERY
Discharge: HOME | End: 2022-08-19
Attending: ORTHOPAEDIC SURGERY | Admitting: ORTHOPAEDIC SURGERY
Payer: COMMERCIAL

## 2022-08-19 ENCOUNTER — ANESTHESIA EVENT (OUTPATIENT)
Dept: SURGERY | Facility: HOSPITAL | Age: 37
End: 2022-08-19
Payer: COMMERCIAL

## 2022-08-19 VITALS
RESPIRATION RATE: 18 BRPM | BODY MASS INDEX: 33.87 KG/M2 | WEIGHT: 215.81 LBS | OXYGEN SATURATION: 100 % | HEIGHT: 67 IN | SYSTOLIC BLOOD PRESSURE: 108 MMHG | TEMPERATURE: 98 F | HEART RATE: 54 BPM | DIASTOLIC BLOOD PRESSURE: 59 MMHG

## 2022-08-19 DIAGNOSIS — S64.10XA: ICD-10-CM

## 2022-08-19 DIAGNOSIS — G56.02 CARPAL TUNNEL SYNDROME ON LEFT: ICD-10-CM

## 2022-08-19 DIAGNOSIS — Z01.812 ENCOUNTER FOR PREOPERATIVE SCREENING LABORATORY TESTING FOR COVID-19 VIRUS: Primary | ICD-10-CM

## 2022-08-19 DIAGNOSIS — Z20.822 ENCOUNTER FOR PREOPERATIVE SCREENING LABORATORY TESTING FOR COVID-19 VIRUS: Primary | ICD-10-CM

## 2022-08-19 PROCEDURE — 81025 URINE PREGNANCY TEST: CPT

## 2022-08-19 PROCEDURE — 76942 ECHO GUIDE FOR BIOPSY: CPT | Performed by: ANESTHESIOLOGY

## 2022-08-19 PROCEDURE — 01N50ZZ RELEASE MEDIAN NERVE, OPEN APPROACH: ICD-10-PCS | Performed by: ORTHOPAEDIC SURGERY

## 2022-08-19 RX ORDER — MEPERIDINE HYDROCHLORIDE 25 MG/ML
12.5 INJECTION INTRAMUSCULAR; INTRAVENOUS; SUBCUTANEOUS AS NEEDED
Status: DISCONTINUED | OUTPATIENT
Start: 2022-08-19 | End: 2022-08-19

## 2022-08-19 RX ORDER — MORPHINE SULFATE 4 MG/ML
6 INJECTION, SOLUTION INTRAMUSCULAR; INTRAVENOUS EVERY 5 MIN PRN
Status: DISCONTINUED | OUTPATIENT
Start: 2022-08-19 | End: 2022-08-19

## 2022-08-19 RX ORDER — MORPHINE SULFATE 4 MG/ML
2 INJECTION, SOLUTION INTRAMUSCULAR; INTRAVENOUS EVERY 5 MIN PRN
Status: DISCONTINUED | OUTPATIENT
Start: 2022-08-19 | End: 2022-08-19

## 2022-08-19 RX ORDER — HYDROCODONE BITARTRATE AND ACETAMINOPHEN 5; 325 MG/1; MG/1
1 TABLET ORAL EVERY 6 HOURS PRN
Qty: 15 TABLET | Refills: 0 | Status: SHIPPED | OUTPATIENT
Start: 2022-08-19

## 2022-08-19 RX ORDER — SODIUM CHLORIDE, SODIUM LACTATE, POTASSIUM CHLORIDE, CALCIUM CHLORIDE 600; 310; 30; 20 MG/100ML; MG/100ML; MG/100ML; MG/100ML
INJECTION, SOLUTION INTRAVENOUS CONTINUOUS
Status: DISCONTINUED | OUTPATIENT
Start: 2022-08-19 | End: 2022-08-19

## 2022-08-19 RX ORDER — SCOLOPAMINE TRANSDERMAL SYSTEM 1 MG/1
1 PATCH, EXTENDED RELEASE TRANSDERMAL ONCE
Status: DISCONTINUED | OUTPATIENT
Start: 2022-08-19 | End: 2022-08-19

## 2022-08-19 RX ORDER — NALOXONE HYDROCHLORIDE 0.4 MG/ML
80 INJECTION, SOLUTION INTRAMUSCULAR; INTRAVENOUS; SUBCUTANEOUS AS NEEDED
Status: DISCONTINUED | OUTPATIENT
Start: 2022-08-19 | End: 2022-08-19

## 2022-08-19 RX ORDER — KETAMINE HYDROCHLORIDE 50 MG/ML
INJECTION, SOLUTION, CONCENTRATE INTRAMUSCULAR; INTRAVENOUS AS NEEDED
Status: DISCONTINUED | OUTPATIENT
Start: 2022-08-19 | End: 2022-08-19 | Stop reason: SURG

## 2022-08-19 RX ORDER — MORPHINE SULFATE 4 MG/ML
4 INJECTION, SOLUTION INTRAMUSCULAR; INTRAVENOUS EVERY 5 MIN PRN
Status: DISCONTINUED | OUTPATIENT
Start: 2022-08-19 | End: 2022-08-19

## 2022-08-19 RX ORDER — ACETAMINOPHEN 500 MG
1000 TABLET ORAL ONCE AS NEEDED
Status: DISCONTINUED | OUTPATIENT
Start: 2022-08-19 | End: 2022-08-19

## 2022-08-19 RX ORDER — CEFAZOLIN SODIUM/WATER 2 G/20 ML
2 SYRINGE (ML) INTRAVENOUS ONCE
Status: COMPLETED | OUTPATIENT
Start: 2022-08-19 | End: 2022-08-19

## 2022-08-19 RX ORDER — MIDAZOLAM HYDROCHLORIDE 1 MG/ML
INJECTION INTRAMUSCULAR; INTRAVENOUS AS NEEDED
Status: DISCONTINUED | OUTPATIENT
Start: 2022-08-19 | End: 2022-08-19 | Stop reason: SURG

## 2022-08-19 RX ORDER — LIDOCAINE HYDROCHLORIDE 10 MG/ML
INJECTION, SOLUTION INFILTRATION; PERINEURAL AS NEEDED
Status: DISCONTINUED | OUTPATIENT
Start: 2022-08-19 | End: 2022-08-19 | Stop reason: HOSPADM

## 2022-08-19 RX ORDER — HYDROCODONE BITARTRATE AND ACETAMINOPHEN 5; 325 MG/1; MG/1
1 TABLET ORAL EVERY 6 HOURS PRN
Status: DISCONTINUED | OUTPATIENT
Start: 2022-08-19 | End: 2022-08-19

## 2022-08-19 RX ORDER — MIDAZOLAM HYDROCHLORIDE 1 MG/ML
1 INJECTION INTRAMUSCULAR; INTRAVENOUS EVERY 5 MIN PRN
Status: DISCONTINUED | OUTPATIENT
Start: 2022-08-19 | End: 2022-08-19

## 2022-08-19 RX ORDER — LABETALOL HYDROCHLORIDE 5 MG/ML
5 INJECTION, SOLUTION INTRAVENOUS EVERY 5 MIN PRN
Status: DISCONTINUED | OUTPATIENT
Start: 2022-08-19 | End: 2022-08-19

## 2022-08-19 RX ORDER — ACETAMINOPHEN 500 MG
1000 TABLET ORAL ONCE
Status: DISCONTINUED | OUTPATIENT
Start: 2022-08-19 | End: 2022-08-19 | Stop reason: HOSPADM

## 2022-08-19 RX ADMIN — CEFAZOLIN SODIUM/WATER 2 G: 2 G/20 ML SYRINGE (ML) INTRAVENOUS at 09:47:00

## 2022-08-19 RX ADMIN — MIDAZOLAM HYDROCHLORIDE 2 MG: 1 INJECTION INTRAMUSCULAR; INTRAVENOUS at 09:50:00

## 2022-08-19 RX ADMIN — KETAMINE HYDROCHLORIDE 25 MG: 50 INJECTION, SOLUTION, CONCENTRATE INTRAMUSCULAR; INTRAVENOUS at 09:50:00

## 2022-08-19 RX ADMIN — SODIUM CHLORIDE, SODIUM LACTATE, POTASSIUM CHLORIDE, CALCIUM CHLORIDE: 600; 310; 30; 20 INJECTION, SOLUTION INTRAVENOUS at 09:47:00

## 2022-08-19 NOTE — OPERATIVE REPORT
Operative Note    Patient Name: Shiela Wilks    Preoperative Diagnosis:     1. Injury of palmar cutaneous branch of median nerve [S64.10XA]  2. Carpal tunnel syndrome on left [G56.02]    Postoperative Diagnosis:     1. Injury of palmar cutaneous branch of median nerve [S64.10XA]  2. Carpal tunnel syndrome on left [G56.02]    Surgeon(s) and Role:     Rebecca Garcia MD - Primary     Assistant: PA: LOIDA Diaz     A PA was needed for the successful completion of this case. She was essential for the proper positioning of patient, manipulation of instruments, proper exposure, manipulation of soft tissue, and wound closure. Procedures:     1. Left carpal tunnel release (48417)  2. Neurolysis of left palmar cutaneous branch of median nerve  3. Operative use of microscope     Antibiotics: Ancef 2g    Surgical Findings: intact palmar cutaneous branch     Anesthesia: Regional + MAC    Complications: None    Implants: None    Specimen: None    Condition: Stable    Estimated Blood Loss: 5 mL    Indications:  43year old female with numbness and tingling in the median nerve distribution as well as in the palmar cutaneous branch distribution. After a self harming injury, patient was complaining of numbness and tingling in the palm area. On examination she was noted to have diminished sensation of the palmar cutaneous branch which was suspected secondary to a direct laceration. No other flexor tendon injuries were noted on examination. She was eventually worked up for carpal tunnel syndrome with an EMG/NCV, which confirmed her diagnosis. We discussed carpal tunnel release as well as exploration and possible nerve repair if the palmar cutaneous branch was noted to be lacerated. The risks associated with the procedure, expected  outcome, the expected time to recovery, and the rehab required were reviewed. Any patient's questions were answered.       Procedure:  Patient was met in the preoperative holding area where consent was verified, laterality was marked with the surgeon's initials, and the H&P was updated. Patient was brought to the operating room on a transport cart. Patient was placed in supine position. SCDs were placed. An upper arm tourniquet was placed. The extremity was prepped and draped in usual sterile fashion. A surgical timeout was performed. The limb was elevated and exsanguinated using Esmarch. Tourniquet was raised to 250 mmHg. A 15 blade was used to make an incision along the radial border of the ring finger with the distal extent being the hook of the hamate and the proximal extent being the pisiform. 15 blade was used to make an incision through the dermis. A Ray-Laxmi was used to perform blunt dissection onto the palmar fascia. Palmar fascia was incised longitudinally. Blunt dissection was once again used to identify the transverse carpal ligament which was then divided longitudinally along the entirety of its length. A tight carpal tunnel was noted. The soft tissue was dissected off the proximal edge of transverse carpal ligament and antebrachial fascia superficial and deep to it using SARITA Blakely. Incision was also carried proximally in a Walter type fashion over the volar wrist creases and then longitudinally proximally. The incision was carried through the dermis. Absence the incision was used to dissected subcutaneous tissue. Bipolar cautery was used to cauterize any crossing vessels. The antebrachial fascia was divided longitudinally with a 15 blade. The palmar cutaneous branch was then identified just ulnar to the FCR tendon. There were 2 palmar cutaneous branches that were identified with a branch from the more superficial connecting to the deeper branch. Using microscissors and micro pickups using the operative microscope we then dissected 2 cm proximally and distally from the zone of injury to identify any lacerations to the nerve.   No nerve lacerations were noted. The nerve was neurolysed from the surrounding soft tissues. Once the nerve branches were circumferentially neurolysed, tourniquet was let down and bipolar cautery was used to achieve hemostasis. Closure: The incision was then closed with 4-0 nylon in a horizontal mattress fashion. Dressing/Splint:  Bacitracin, 4 x 4 gauze, and a loosely wrapped Ace wrap was used to hold the dressing in place. Post Operative:  Patient was woken up from anesthesia and taken to PACU for further recovery and discharge home. Neno David MD  Hand, Wrist, & Elbow Surgery  [facility]  [facility] [address], [address] [facility], [address] [place] [address]. [email or web address]  [email or web address]  t: [id number]  f: [phone]

## 2022-08-19 NOTE — ANESTHESIA PROCEDURE NOTES
Regional Block  Performed by: Adama Ayala MD  Authorized by: Adama Ayala MD       General Information and Staff    Start Time:  8/19/2022 9:50 AM  End Time:  8/19/2022 9:59 AM  Anesthesiologist:  Adama Ayala MD  Performed by: Anesthesiologist  Patient Location:  OR    Block Placement: Pre Induction  Site Identification: real time ultrasound guided, nerve stimulator and image stored and retrievable    Block site/laterality marked before start: site marked  Reason for Block: at surgeon's request and post-op pain management    Preanesthetic Checklist: 2 patient identifers, IV checked, site marked, risks and benefits discussed, monitors and equipment checked, pre-op evaluation, timeout performed, anesthesia consent, sterile technique used, no prohibitive neurological deficits and no local skin infection at insertion site      Procedure Details    Patient Position:  Supine (HOB elevated 15 degrees)  Prep: ChloraPrep    Monitoring:  Cardiac monitor, continuous pulse ox and blood pressure cuff  Injection Technique:  Single-shot    Needle    Needle Type:  Short-bevel and echogenic  Needle Gauge:  21 G  Needle Length:  100 mm  Needle Localization:  Ultrasound guidance  Reason for Ultrasound Use: appropriate spread of the medication was noted in real time and no ultrasound evidence of intravascular and/or intraneural injection    Nerve Stimulator: 0.4 amps    Muscle Twitch Response Comment: none    Assessment    Injection Assessment:  Good spread noted, negative resistance, negative aspiration for heme, incremental injection, low pressure, local visualized surrounding nerve on ultrasound and no pain on injection  Heart Rate Change: No    - Patient tolerated block procedure well without evidence of immediate block related complications.      Medications      Additional Comments    Medication:  Ropivacaine 0.5% 30mL

## 2022-08-21 LAB — B-HCG UR QL: NEGATIVE

## 2022-09-01 ENCOUNTER — OFFICE VISIT (OUTPATIENT)
Dept: ORTHOPEDICS CLINIC | Facility: CLINIC | Age: 37
End: 2022-09-01
Payer: COMMERCIAL

## 2022-09-01 VITALS — BODY MASS INDEX: 32.96 KG/M2 | HEIGHT: 67 IN | WEIGHT: 210 LBS

## 2022-09-01 DIAGNOSIS — S64.10XA: Primary | ICD-10-CM

## 2022-09-01 DIAGNOSIS — G56.03 BILATERAL CARPAL TUNNEL SYNDROME: ICD-10-CM

## 2022-09-01 PROCEDURE — 99024 POSTOP FOLLOW-UP VISIT: CPT | Performed by: ORTHOPAEDIC SURGERY

## 2022-09-01 PROCEDURE — 3008F BODY MASS INDEX DOCD: CPT | Performed by: ORTHOPAEDIC SURGERY

## 2022-11-15 ENCOUNTER — OFFICE VISIT (OUTPATIENT)
Dept: HEMATOLOGY/ONCOLOGY | Facility: HOSPITAL | Age: 37
End: 2022-11-15
Attending: SPECIALIST
Payer: COMMERCIAL

## 2022-11-15 DIAGNOSIS — D50.0 IRON DEFICIENCY ANEMIA DUE TO CHRONIC BLOOD LOSS: Primary | ICD-10-CM

## 2022-12-28 ENCOUNTER — PATIENT MESSAGE (OUTPATIENT)
Dept: FAMILY MEDICINE CLINIC | Facility: CLINIC | Age: 37
End: 2022-12-28

## 2022-12-28 DIAGNOSIS — Z76.89 ENCOUNTER FOR WEIGHT MANAGEMENT: ICD-10-CM

## 2022-12-28 DIAGNOSIS — Z30.41 ENCOUNTER FOR BIRTH CONTROL PILLS MAINTENANCE: ICD-10-CM

## 2022-12-28 RX ORDER — PHENTERMINE HYDROCHLORIDE 37.5 MG/1
TABLET ORAL
Qty: 30 TABLET | Refills: 0 | OUTPATIENT
Start: 2022-12-28

## 2022-12-28 RX ORDER — DROSPIRENONE AND ETHINYL ESTRADIOL 0.03MG-3MG
1 KIT ORAL DAILY
Qty: 90 TABLET | Refills: 0 | Status: SHIPPED | OUTPATIENT
Start: 2022-12-28

## 2022-12-28 RX ORDER — PHENTERMINE HYDROCHLORIDE 37.5 MG/1
37.5 TABLET ORAL
Qty: 30 TABLET | Refills: 0 | Status: CANCELLED
Start: 2022-12-28

## 2022-12-28 RX ORDER — PHENTERMINE HYDROCHLORIDE 37.5 MG/1
37.5 TABLET ORAL
Qty: 30 TABLET | Refills: 2 | OUTPATIENT
Start: 2022-12-28

## 2022-12-28 NOTE — TELEPHONE ENCOUNTER
Pt has been off phentermine but would like to go back on. She will make appt in March when she has  Insurance. Will you approve RX?

## 2023-02-20 ENCOUNTER — TELEPHONE (OUTPATIENT)
Dept: FAMILY MEDICINE CLINIC | Facility: CLINIC | Age: 38
End: 2023-02-20
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
## 2023-02-21 NOTE — TELEPHONE ENCOUNTER
Pt informed to go to ER. Pt states she is going to wait and make an appt after the 1st.  Advised worsening or persistent symptoms to ER. Pt verbalized understanding.

## 2023-03-17 ENCOUNTER — OFFICE VISIT (OUTPATIENT)
Dept: HEMATOLOGY/ONCOLOGY | Facility: HOSPITAL | Age: 38
End: 2023-03-17
Attending: SPECIALIST
Payer: COMMERCIAL

## 2023-03-17 VITALS
DIASTOLIC BLOOD PRESSURE: 99 MMHG | OXYGEN SATURATION: 100 % | HEART RATE: 107 BPM | TEMPERATURE: 98 F | SYSTOLIC BLOOD PRESSURE: 158 MMHG | RESPIRATION RATE: 20 BRPM

## 2023-03-17 DIAGNOSIS — Z86.711 HISTORY OF PULMONARY EMBOLISM: ICD-10-CM

## 2023-03-17 DIAGNOSIS — Z79.01 CHRONIC ANTICOAGULATION: ICD-10-CM

## 2023-03-17 DIAGNOSIS — D50.0 IRON DEFICIENCY ANEMIA DUE TO CHRONIC BLOOD LOSS: Primary | ICD-10-CM

## 2023-03-17 LAB
BASOPHILS # BLD AUTO: 0.02 X10(3) UL (ref 0–0.2)
BASOPHILS NFR BLD AUTO: 0.4 %
DEPRECATED HBV CORE AB SER IA-ACNC: 24.8 NG/ML
EOSINOPHIL # BLD AUTO: 0.2 X10(3) UL (ref 0–0.7)
EOSINOPHIL NFR BLD AUTO: 4.4 %
ERYTHROCYTE [DISTWIDTH] IN BLOOD BY AUTOMATED COUNT: 13 %
HCT VFR BLD AUTO: 35.4 %
HGB BLD-MCNC: 11.3 G/DL
IMM GRANULOCYTES # BLD AUTO: 0.02 X10(3) UL (ref 0–1)
IMM GRANULOCYTES NFR BLD: 0.4 %
IRON SATN MFR SERPL: 8 %
IRON SERPL-MCNC: 31 UG/DL
LYMPHOCYTES # BLD AUTO: 1.21 X10(3) UL (ref 1–4)
LYMPHOCYTES NFR BLD AUTO: 26.6 %
MCH RBC QN AUTO: 28 PG (ref 26–34)
MCHC RBC AUTO-ENTMCNC: 31.9 G/DL (ref 31–37)
MCV RBC AUTO: 87.6 FL
MONOCYTES # BLD AUTO: 0.48 X10(3) UL (ref 0.1–1)
MONOCYTES NFR BLD AUTO: 10.5 %
NEUTROPHILS # BLD AUTO: 2.62 X10 (3) UL (ref 1.5–7.7)
NEUTROPHILS # BLD AUTO: 2.62 X10(3) UL (ref 1.5–7.7)
NEUTROPHILS NFR BLD AUTO: 57.7 %
PLATELET # BLD AUTO: 278 10(3)UL (ref 150–450)
RBC # BLD AUTO: 4.04 X10(6)UL
TIBC SERPL-MCNC: 402 UG/DL (ref 240–450)
TRANSFERRIN SERPL-MCNC: 270 MG/DL (ref 200–360)
WBC # BLD AUTO: 4.6 X10(3) UL (ref 4–11)

## 2023-03-17 PROCEDURE — 99214 OFFICE O/P EST MOD 30 MIN: CPT | Performed by: SPECIALIST

## 2023-03-20 ENCOUNTER — TELEPHONE (OUTPATIENT)
Dept: HEMATOLOGY/ONCOLOGY | Facility: HOSPITAL | Age: 38
End: 2023-03-20

## 2023-03-20 NOTE — TELEPHONE ENCOUNTER
Cooper Alvarenga MD  P Edw Sushila Ch Rns  Contact patient via mobile number. She is iron deficient. She can come in for IV iron x 2  by 1 week. I don't need to see her. Patient notified. Instructed I would have scheduling reach out to her for appointments. Patient stated understanding. Will need 2 one hour Fereheme appointments one week apart.

## 2023-03-27 ENCOUNTER — APPOINTMENT (OUTPATIENT)
Dept: HEMATOLOGY/ONCOLOGY | Facility: HOSPITAL | Age: 38
End: 2023-03-27
Attending: SPECIALIST
Payer: COMMERCIAL

## 2023-03-28 ENCOUNTER — OFFICE VISIT (OUTPATIENT)
Dept: HEMATOLOGY/ONCOLOGY | Facility: HOSPITAL | Age: 38
End: 2023-03-28
Attending: SPECIALIST
Payer: COMMERCIAL

## 2023-03-28 VITALS
DIASTOLIC BLOOD PRESSURE: 76 MMHG | OXYGEN SATURATION: 100 % | SYSTOLIC BLOOD PRESSURE: 128 MMHG | TEMPERATURE: 98 F | HEART RATE: 70 BPM | RESPIRATION RATE: 18 BRPM

## 2023-03-28 DIAGNOSIS — D64.9 ANEMIA: Primary | ICD-10-CM

## 2023-03-28 DIAGNOSIS — D50.0 IRON DEFICIENCY ANEMIA SECONDARY TO BLOOD LOSS (CHRONIC): ICD-10-CM

## 2023-03-28 DIAGNOSIS — D50.8 IRON DEFICIENCY ANEMIA REFRACTORY TO IRON THERAPY: ICD-10-CM

## 2023-03-28 PROCEDURE — 96374 THER/PROPH/DIAG INJ IV PUSH: CPT

## 2023-03-28 PROCEDURE — 96375 TX/PRO/DX INJ NEW DRUG ADDON: CPT

## 2023-03-28 NOTE — PROGRESS NOTES
Pt here for iron infusion. Arrives Ambulating independently, accompanied by Other self           Modifications in dose or schedule: No     Frequency of blood return and site check throughout administration: Prior to administration   Discharged to Home, Ambulating independently, accompanied by: Other self    Outpatient Oncology Care Plan  Problem list:  anemia  Problems related to:    disease/disease progression  Interventions:  administered iron  Expected outcomes:  optimal lab values  Progress towards outcome:  making progress    Education Record    Learner:  Patient  Barriers / Limitations:  None  Method:  Brief focused  Outcome:  Shows understanding  Comments:observed pt 30 min post infusion. Pt tolerated infusion. No c/o. VSS. See flowsheet    Pt tolerated Feraheme without incident. Before starting infusion patient had mentioned how in the past she gets very \"sick\" with iron. She has gotten zofran in the past prior to the infusion. Spoke with treatment NP who ordered 8mg zofran ivpb prior to the feraheme. VSS. Left in stable condition with appt in 1 week for follow-up.

## 2023-04-03 ENCOUNTER — APPOINTMENT (OUTPATIENT)
Dept: HEMATOLOGY/ONCOLOGY | Facility: HOSPITAL | Age: 38
End: 2023-04-03
Attending: SPECIALIST
Payer: COMMERCIAL

## 2023-04-04 ENCOUNTER — APPOINTMENT (OUTPATIENT)
Dept: HEMATOLOGY/ONCOLOGY | Facility: HOSPITAL | Age: 38
End: 2023-04-04
Attending: SPECIALIST
Payer: COMMERCIAL

## 2023-04-11 ENCOUNTER — OFFICE VISIT (OUTPATIENT)
Dept: HEMATOLOGY/ONCOLOGY | Facility: HOSPITAL | Age: 38
End: 2023-04-11
Attending: SPECIALIST
Payer: COMMERCIAL

## 2023-04-11 ENCOUNTER — TELEPHONE (OUTPATIENT)
Dept: FAMILY MEDICINE CLINIC | Facility: CLINIC | Age: 38
End: 2023-04-11

## 2023-04-11 ENCOUNTER — OFFICE VISIT (OUTPATIENT)
Dept: FAMILY MEDICINE CLINIC | Facility: CLINIC | Age: 38
End: 2023-04-11
Payer: COMMERCIAL

## 2023-04-11 VITALS
HEIGHT: 67 IN | OXYGEN SATURATION: 99 % | DIASTOLIC BLOOD PRESSURE: 64 MMHG | WEIGHT: 257 LBS | HEART RATE: 71 BPM | BODY MASS INDEX: 40.34 KG/M2 | RESPIRATION RATE: 18 BRPM | SYSTOLIC BLOOD PRESSURE: 122 MMHG

## 2023-04-11 VITALS
DIASTOLIC BLOOD PRESSURE: 90 MMHG | OXYGEN SATURATION: 99 % | SYSTOLIC BLOOD PRESSURE: 138 MMHG | BODY MASS INDEX: 39.24 KG/M2 | HEART RATE: 89 BPM | HEIGHT: 67.01 IN | WEIGHT: 250 LBS | RESPIRATION RATE: 20 BRPM | TEMPERATURE: 98 F

## 2023-04-11 DIAGNOSIS — G43.019 INTRACTABLE MIGRAINE WITHOUT AURA AND WITHOUT STATUS MIGRAINOSUS: ICD-10-CM

## 2023-04-11 DIAGNOSIS — Z76.89 ENCOUNTER FOR WEIGHT MANAGEMENT: ICD-10-CM

## 2023-04-11 DIAGNOSIS — E66.9 OBESITY (BMI 30-39.9): ICD-10-CM

## 2023-04-11 DIAGNOSIS — D50.0 IRON DEFICIENCY ANEMIA SECONDARY TO BLOOD LOSS (CHRONIC): Primary | ICD-10-CM

## 2023-04-11 DIAGNOSIS — Z51.81 ENCOUNTER FOR THERAPEUTIC DRUG MONITORING: ICD-10-CM

## 2023-04-11 DIAGNOSIS — M54.12 CERVICAL RADICULAR PAIN: ICD-10-CM

## 2023-04-11 DIAGNOSIS — D50.8 IRON DEFICIENCY ANEMIA REFRACTORY TO IRON THERAPY: ICD-10-CM

## 2023-04-11 DIAGNOSIS — Z30.41 ENCOUNTER FOR BIRTH CONTROL PILLS MAINTENANCE: ICD-10-CM

## 2023-04-11 DIAGNOSIS — Z00.00 GENERAL MEDICAL EXAM: ICD-10-CM

## 2023-04-11 LAB
CONTROL LINE PRESENT WITH A CLEAR BACKGROUND (YES/NO): YES YES/NO
PREGNANCY TEST, URINE: NO

## 2023-04-11 PROCEDURE — 3008F BODY MASS INDEX DOCD: CPT | Performed by: FAMILY MEDICINE

## 2023-04-11 PROCEDURE — 99215 OFFICE O/P EST HI 40 MIN: CPT | Performed by: FAMILY MEDICINE

## 2023-04-11 PROCEDURE — 3078F DIAST BP <80 MM HG: CPT | Performed by: FAMILY MEDICINE

## 2023-04-11 PROCEDURE — 87491 CHLMYD TRACH DNA AMP PROBE: CPT | Performed by: FAMILY MEDICINE

## 2023-04-11 PROCEDURE — 87591 N.GONORRHOEAE DNA AMP PROB: CPT | Performed by: FAMILY MEDICINE

## 2023-04-11 PROCEDURE — 96374 THER/PROPH/DIAG INJ IV PUSH: CPT

## 2023-04-11 PROCEDURE — 81025 URINE PREGNANCY TEST: CPT | Performed by: FAMILY MEDICINE

## 2023-04-11 PROCEDURE — 3074F SYST BP LT 130 MM HG: CPT | Performed by: FAMILY MEDICINE

## 2023-04-11 PROCEDURE — 96375 TX/PRO/DX INJ NEW DRUG ADDON: CPT

## 2023-04-11 RX ORDER — PHENTERMINE HYDROCHLORIDE 37.5 MG/1
37.5 TABLET ORAL
Qty: 30 TABLET | Refills: 2 | Status: SHIPPED | OUTPATIENT
Start: 2023-04-11 | End: 2023-04-12

## 2023-04-11 RX ORDER — METAXALONE 800 MG/1
800 TABLET ORAL 2 TIMES DAILY PRN
Qty: 30 TABLET | Refills: 0 | Status: SHIPPED | OUTPATIENT
Start: 2023-04-11 | End: 2023-05-01

## 2023-04-11 RX ORDER — TOPIRAMATE 25 MG/1
50 TABLET ORAL 2 TIMES DAILY
Qty: 120 TABLET | Refills: 2 | Status: SHIPPED | OUTPATIENT
Start: 2023-04-11

## 2023-04-11 RX ORDER — ONDANSETRON 2 MG/ML
8 INJECTION INTRAMUSCULAR; INTRAVENOUS ONCE
Status: COMPLETED | OUTPATIENT
Start: 2023-04-11 | End: 2023-04-11

## 2023-04-11 RX ORDER — UBROGEPANT 100 MG/1
TABLET ORAL
Qty: 10 TABLET | Refills: 2 | Status: SHIPPED | OUTPATIENT
Start: 2023-04-11

## 2023-04-11 RX ORDER — DROSPIRENONE AND ETHINYL ESTRADIOL 0.03MG-3MG
1 KIT ORAL DAILY
Qty: 28 TABLET | Refills: 2 | Status: SHIPPED | OUTPATIENT
Start: 2023-04-11

## 2023-04-11 RX ADMIN — ONDANSETRON 8 MG: 2 INJECTION INTRAMUSCULAR; INTRAVENOUS at 15:45:00

## 2023-04-11 NOTE — PROGRESS NOTES
Education Record    Learner:  Patient    Disease / Diagnosis: BEN    Barriers / Limitations:  None   Comments:    Method:  Brief focused and Reinforcement   Comments:    General Topics:  Diet, Medication, Side effects and symptom management and Plan of care reviewed   Comments:    Outcome:  Shows understanding   Comments: Patient stated receiving iron makes her nauseous and requested Zofran prior to start. Per Avelino Lam, NP okay to give Zofran 8mg IVP. Medication ordered and given. Feraheme infused without any complications. Observed for half hour after infusion. Vital signs taken at the end of the 30-minute observation. Patient denied any complaints/issues. Discharged in good condition. Advised to call for any questions/concerns/issues.

## 2023-04-12 DIAGNOSIS — Z76.89 ENCOUNTER FOR WEIGHT MANAGEMENT: ICD-10-CM

## 2023-04-12 LAB
C TRACH DNA SPEC QL NAA+PROBE: NEGATIVE
N GONORRHOEA DNA SPEC QL NAA+PROBE: NEGATIVE

## 2023-04-12 RX ORDER — PHENTERMINE HYDROCHLORIDE 37.5 MG/1
37.5 TABLET ORAL
Qty: 30 TABLET | Refills: 2 | Status: SHIPPED | OUTPATIENT
Start: 2023-04-12

## 2023-04-12 NOTE — TELEPHONE ENCOUNTER
Dr. Michael Bedolla,  See below - medication pended to [facility] - please sign off.

## 2023-06-13 ENCOUNTER — DOCUMENTATION ONLY (OUTPATIENT)
Dept: SURGERY | Facility: CLINIC | Age: 38
End: 2023-06-13

## 2023-06-13 ENCOUNTER — OFFICE VISIT (OUTPATIENT)
Dept: SURGERY | Facility: CLINIC | Age: 38
End: 2023-06-13
Payer: COMMERCIAL

## 2023-06-13 ENCOUNTER — TELEPHONE (OUTPATIENT)
Dept: SURGERY | Facility: CLINIC | Age: 38
End: 2023-06-13

## 2023-06-13 VITALS
SYSTOLIC BLOOD PRESSURE: 126 MMHG | HEART RATE: 96 BPM | OXYGEN SATURATION: 99 % | WEIGHT: 260 LBS | HEIGHT: 67 IN | BODY MASS INDEX: 40.81 KG/M2 | DIASTOLIC BLOOD PRESSURE: 80 MMHG

## 2023-06-13 DIAGNOSIS — E66.9 OBESITY (BMI 30-39.9): Primary | ICD-10-CM

## 2023-06-14 PROBLEM — E66.01 MORBID OBESITY WITH BMI OF 40.0-44.9, ADULT (HCC): Status: ACTIVE | Noted: 2023-06-14

## 2023-06-14 PROBLEM — F43.9 STRESS: Status: ACTIVE | Noted: 2023-06-14

## 2023-06-14 NOTE — PROGRESS NOTES
Oriented pt to the bariatric program; provided/reviewed bariatric packet of info, time line, referrals, etc; pt agreed and verbalized understanding; attended seminar on 4/10/23.

## 2023-06-29 ENCOUNTER — OFFICE VISIT (OUTPATIENT)
Dept: SURGERY | Facility: CLINIC | Age: 38
End: 2023-06-29
Payer: COMMERCIAL

## 2023-06-29 ENCOUNTER — LAB ENCOUNTER (OUTPATIENT)
Dept: LAB | Facility: HOSPITAL | Age: 38
End: 2023-06-29
Attending: SURGERY
Payer: COMMERCIAL

## 2023-06-29 VITALS — BODY MASS INDEX: 39.62 KG/M2 | HEIGHT: 67.2 IN | WEIGHT: 255.38 LBS

## 2023-06-29 DIAGNOSIS — Z00.00 GENERAL MEDICAL EXAM: ICD-10-CM

## 2023-06-29 DIAGNOSIS — E66.9 OBESITY (BMI 30-39.9): Primary | ICD-10-CM

## 2023-06-29 DIAGNOSIS — E66.9 OBESITY (BMI 30-39.9): ICD-10-CM

## 2023-06-29 LAB
ALBUMIN SERPL-MCNC: 3.7 G/DL (ref 3.4–5)
ALBUMIN/GLOB SERPL: 0.9 {RATIO} (ref 1–2)
ALP LIVER SERPL-CCNC: 79 U/L
ALT SERPL-CCNC: 18 U/L
ANION GAP SERPL CALC-SCNC: 6 MMOL/L (ref 0–18)
AST SERPL-CCNC: 9 U/L (ref 15–37)
BASOPHILS # BLD AUTO: 0.02 X10(3) UL (ref 0–0.2)
BASOPHILS NFR BLD AUTO: 0.3 %
BILIRUB SERPL-MCNC: 0.4 MG/DL (ref 0.1–2)
BUN BLD-MCNC: 12 MG/DL (ref 7–18)
BUN/CREAT SERPL: 15 (ref 10–20)
CALCIUM BLD-MCNC: 9.2 MG/DL (ref 8.5–10.1)
CHLORIDE SERPL-SCNC: 112 MMOL/L (ref 98–112)
CHOLEST SERPL-MCNC: 164 MG/DL (ref ?–200)
CO2 SERPL-SCNC: 21 MMOL/L (ref 21–32)
CREAT BLD-MCNC: 0.8 MG/DL
DEPRECATED HBV CORE AB SER IA-ACNC: 159.5 NG/ML
DEPRECATED RDW RBC AUTO: 45.3 FL (ref 35.1–46.3)
EOSINOPHIL # BLD AUTO: 0.16 X10(3) UL (ref 0–0.7)
EOSINOPHIL NFR BLD AUTO: 2.6 %
ERYTHROCYTE [DISTWIDTH] IN BLOOD BY AUTOMATED COUNT: 14 % (ref 11–15)
EST. AVERAGE GLUCOSE BLD GHB EST-MCNC: 100 MG/DL (ref 68–126)
FASTING PATIENT LIPID ANSWER: YES
FASTING STATUS PATIENT QL REPORTED: YES
FOLATE SERPL-MCNC: 18.7 NG/ML (ref 8.7–?)
GFR SERPLBLD BASED ON 1.73 SQ M-ARVRAT: 96 ML/MIN/1.73M2 (ref 60–?)
GLOBULIN PLAS-MCNC: 3.9 G/DL (ref 2.8–4.4)
GLUCOSE BLD-MCNC: 97 MG/DL (ref 70–99)
HBA1C MFR BLD: 5.1 % (ref ?–5.7)
HCT VFR BLD AUTO: 37.4 %
HDLC SERPL-MCNC: 65 MG/DL (ref 40–59)
HGB BLD-MCNC: 12.4 G/DL
IMM GRANULOCYTES # BLD AUTO: 0.03 X10(3) UL (ref 0–1)
IMM GRANULOCYTES NFR BLD: 0.5 %
IRON SATN MFR SERPL: 15 %
IRON SERPL-MCNC: 62 UG/DL
LDLC SERPL CALC-MCNC: 68 MG/DL (ref ?–100)
LYMPHOCYTES # BLD AUTO: 1.13 X10(3) UL (ref 1–4)
LYMPHOCYTES NFR BLD AUTO: 18.1 %
MCH RBC QN AUTO: 30.1 PG (ref 26–34)
MCHC RBC AUTO-ENTMCNC: 33.2 G/DL (ref 31–37)
MCV RBC AUTO: 90.8 FL
MONOCYTES # BLD AUTO: 0.37 X10(3) UL (ref 0.1–1)
MONOCYTES NFR BLD AUTO: 5.9 %
NEUTROPHILS # BLD AUTO: 4.54 X10 (3) UL (ref 1.5–7.7)
NEUTROPHILS # BLD AUTO: 4.54 X10(3) UL (ref 1.5–7.7)
NEUTROPHILS NFR BLD AUTO: 72.6 %
NONHDLC SERPL-MCNC: 99 MG/DL (ref ?–130)
OSMOLALITY SERPL CALC.SUM OF ELEC: 288 MOSM/KG (ref 275–295)
PLATELET # BLD AUTO: 250 10(3)UL (ref 150–450)
POTASSIUM SERPL-SCNC: 4.5 MMOL/L (ref 3.5–5.1)
PROT SERPL-MCNC: 7.6 G/DL (ref 6.4–8.2)
RBC # BLD AUTO: 4.12 X10(6)UL
SODIUM SERPL-SCNC: 139 MMOL/L (ref 136–145)
T4 FREE SERPL-MCNC: 0.9 NG/DL (ref 0.8–1.7)
TIBC SERPL-MCNC: 425 UG/DL (ref 240–450)
TRANSFERRIN SERPL-MCNC: 285 MG/DL (ref 200–360)
TRIGL SERPL-MCNC: 188 MG/DL (ref 30–149)
TSI SER-ACNC: 1.42 MIU/ML (ref 0.36–3.74)
VIT B12 SERPL-MCNC: 267 PG/ML (ref 193–986)
VIT D+METAB SERPL-MCNC: 27.1 NG/ML (ref 30–100)
VLDLC SERPL CALC-MCNC: 28 MG/DL (ref 0–30)
WBC # BLD AUTO: 6.3 X10(3) UL (ref 4–11)

## 2023-06-29 PROCEDURE — 84439 ASSAY OF FREE THYROXINE: CPT | Performed by: FAMILY MEDICINE

## 2023-06-29 PROCEDURE — 82607 VITAMIN B-12: CPT | Performed by: FAMILY MEDICINE

## 2023-06-29 PROCEDURE — 80061 LIPID PANEL: CPT | Performed by: FAMILY MEDICINE

## 2023-06-29 PROCEDURE — 97802 MEDICAL NUTRITION INDIV IN: CPT | Performed by: DIETITIAN, REGISTERED

## 2023-06-29 PROCEDURE — 80053 COMPREHEN METABOLIC PANEL: CPT | Performed by: FAMILY MEDICINE

## 2023-06-29 PROCEDURE — 82306 VITAMIN D 25 HYDROXY: CPT | Performed by: FAMILY MEDICINE

## 2023-06-29 PROCEDURE — 84443 ASSAY THYROID STIM HORMONE: CPT | Performed by: FAMILY MEDICINE

## 2023-06-29 PROCEDURE — 3008F BODY MASS INDEX DOCD: CPT | Performed by: DIETITIAN, REGISTERED

## 2023-07-03 LAB — VITAMIN B1 WHOLE BLD: 159.3 NMOL/L

## 2023-07-12 ENCOUNTER — DOCUMENTATION ONLY (OUTPATIENT)
Dept: SURGERY | Facility: CLINIC | Age: 38
End: 2023-07-12

## 2023-07-31 NOTE — PROGRESS NOTES
Herson Islas / Kalpesh Leigh / Ousmane Darling / Merry San / Joaquin Ear / Shearon Covert - [phone]  Answering Service [phone]        PCP: Reginaldo Younger: Magalis        6/13/23   260.0     40.7  8/2/23     253.9     39.8       History of Present Illness:  Linda Jacques is a 42year old-year old female presenting for evaluation for bariatric surgery. UNDECIDED - concern about reflux vs. NSAID use. May need ph study     6/13/23  Has struggled with her weight her entire life. Has seen Dr. Gabi Jimenez - gets phentermine from PCP now. Has tried other meds - topiramate, metformin, DEP, only taking topiramate  On phentermine on and off for the past 5 years or so. Felt like it helped at the begininng - lost about 60# along with other drugs, but she can't remember which ones. Went off it for awhile and gained weight back. Has thought about WLS surgery for some time. Was discouraged by Dr. Gabi Jimenez     Has had GERD for 20 years or so. Does note some chronic RUQ abdominal pain as well. Describes as a chest pressure, happens daily, but not taking PPI daily now, but when she does take it it only helps minimally. Some globus sensation with soda. Diet: Carb heavy  Breakfast: bagels to bowl of cereal or breakfast sandwich - usually FF  Lunch: Skips if she eats breakfast  Dinner: pasta, sometimes chicken salad. 50/50 - eating out / takeout vs. Cooking at home       8/2/23 - Logging on a regular basis. Exercise: goes to gym 3x/week -working with  -  susan / Vikash valentin     Pt continues to have occasional nausea, intermittent vomiting - takes compazine 1-2x/week. Saw GI years ago, last EGD was years ago. No obvious cause has been found, unsure what kind of work-up has been shown. No obvious triggers with food actually, finds that nausea and intermittent epigastric discomfort more often happens on an empty stomach.   Continues to have occasional heartburn symptoms. Only takes PPI occasionally but has symptoms she says almost daily. Dietitian: Roney Camera  6/29/23 - Sees Dr. Jose Manuel Bustamante for weight management. Taking phentermine for weightloss. Patient has struggled with weight her whole life. Has tried many diet. Most successful with medications and , however regained the weight 12 months later. Patient's triggers are carb heavy foods. Per diet recall, patient eats usually 3 meals per day. Patient has been working on eating a lower carb diet since starting this process. Has eliminated caffeine and carbonation. Has been drinking 64oz of water per day. Provided healthy snack list to help lower carb intake and increase protein intake. Reviewed daily protein and carb goals. Discussed logging with MND. Patient tries to walk 1-1.5 miles per day with her dog and goes to the gym on [address]. Discussed increasing activity as tolerated. Reviewed program binder and answered patient's questions.          Bar: Magalis  6/14/23 - preop appt scheduled, needs bloodwork, start phentermine    Psych: 7/12/23 - approved, but wants to check-in   -needs followup    Cards: scheduled  Pulm: scheduled  GI: scheduled    Preop Labs  6/29/23 - Vit D 27, B12 267, all else good      Past Medical History:    Migraines  Severe back issues - spinal cord stimujlator   Asthma  GERD  Depression / Anxiety  PE - 2021 - Dr. 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 08/03/23  0801   BP: 126/85   Pulse: 77       Wt Readings from Last 6 Encounters:  08/03/23 : 253 lb 14.4 oz (115.2 kg)  06/29/23 : 255 lb 6.4 oz (115.8 kg)  06/14/23 : 257 lb 8 oz (116.8 kg)  06/13/23 : 260 lb (117.9 kg)  04/11/23 : 250 lb (113.4 kg)  04/11/23 : 257 lb (116.6 kg)      General: Alert & Oriented x3, NAD  HEENT: anicteric, EOMI, oropharynx clear  Neck: no palpable masses, trachea midlien  Chest; clear BS b/l  CV: RR  Abd: well healed incisions, obese, nondistended, soft, maybe some mild epigastric tenderness      42yo female here for preop evaluation for bariatric surgery. Overall doing great in preparation for bariatric surgery. Logging on a semiregular basis, although there are either some skip meals or miss log foods, and I recommended being more consistent. Reviewed her logs and discussed were making certain changes in processed carb snacks her sides can make a big difference in her overall carb intake. Decent focus on protein. Unexplained nausea and abdominal pain. Unclear etiology. Likely will get a repeat GI when she sees gastroenterology, but Ordered upper GI as well, discussed how upper GI is different from an endoscopy      We will reach out to Dr. Mayo Khan to help determine at least some semblance of the differential on the cause of her symptoms. Recommended taking PPI daily for the time being. Also discussed how certain dietary triggers may be contributing and recommended eating on a more regular basis but keeping   portion sizes small, and being mindful of processed carbs, eating more whole foods. May eventually benefit from manometry or gastric emptying study, but will start with GI eval, upper GI and possilbe EGD. Good exercise habits continue to progress.     Labs reviewed, vitamin D, B12 slightly low, discussed options for replacement patient will get chewable bariatric vitamin and start taking 1-2 a day    Needs:   Dietitian  Cards  Pulm  Psych  GI  [facility] GI      Greater than 45 minutes spent evaluating and counseling patient, along with reviewing consultant notes and correspondence and blood work.

## 2023-08-03 ENCOUNTER — OFFICE VISIT (OUTPATIENT)
Dept: SURGERY | Facility: CLINIC | Age: 38
End: 2023-08-03
Payer: COMMERCIAL

## 2023-08-03 VITALS
BODY MASS INDEX: 39.85 KG/M2 | OXYGEN SATURATION: 98 % | WEIGHT: 253.88 LBS | SYSTOLIC BLOOD PRESSURE: 126 MMHG | DIASTOLIC BLOOD PRESSURE: 85 MMHG | HEART RATE: 77 BPM | HEIGHT: 67 IN

## 2023-08-03 DIAGNOSIS — E66.01 MORBID OBESITY WITH BMI OF 40.0-44.9, ADULT (HCC): Primary | ICD-10-CM

## 2023-08-03 DIAGNOSIS — K21.9 GERD WITHOUT ESOPHAGITIS: ICD-10-CM

## 2023-08-11 ENCOUNTER — OFFICE VISIT (OUTPATIENT)
Dept: SURGERY | Facility: CLINIC | Age: 38
End: 2023-08-11
Payer: COMMERCIAL

## 2023-08-11 VITALS — HEIGHT: 67.2 IN | WEIGHT: 252.5 LBS | BODY MASS INDEX: 39.17 KG/M2

## 2023-08-11 DIAGNOSIS — E66.9 OBESITY (BMI 30-39.9): Primary | ICD-10-CM

## 2023-08-11 NOTE — PATIENT INSTRUCTIONS
Recommendations/goals:    1. Keep a food record, My Net Diary. 2.  Strive to consume at least 4-6 meals/snacks per day. Include protein and produce when you eat. Aim for 75-90 grams of protein per day. Try to keep the carbohydrates at 120 grams per day or less. 3.  Practice eating strategies, eat separately from drinking, avoid straws, chew food 20-30 times before swallowing. Make the meals last 30 minutes. -Continue  4. Continue to drink 64 oz per day of water. (Try  Protein water, adding True Lemon, Crystal Light). 5.  Taper caffeine and carbonation.-Done/Continue  6. Exercise and strength train with a goal of 30 minutes per day for exercise (for example,walking). Strength training 10 minutes 3 days per week. -Continue  7. Do the quizzes in the binder, pages 18-24.

## 2023-08-11 NOTE — PROGRESS NOTES
Body Composition Analysis #? ??     1. Weight 252.5 lbs     2. BMI 39.31     3. BMR ??? kcal     4. Percent body fat ???% (???)     5. Visceral fat level 20 (goal or 10 or <)     6. ECW/TBW ???     7. SMM (skeletal muscle mass) ? ?? lbs                  Lean body mass for arms (R & L) ? ?? lbs, ???% & ??? lbs, ???%                  Lean body mass for trunk ? ?? lbs, ???%                   Lean body mass for legs (R & L) ? ?? lbs, ???% & ??? lbs, ???%     8. Body fat mass ? ?? lbs      9. Recommended Body fat amount loss ? ?? lbs    10. Recommendations/Status: see below    [facility]  [address]  [address] [address]  Dept: [phone]  Loc: [phone]    08/11/23      Bariatric Follow-up Nutrition Session    Keith Feliciano is a 42year old female.      Assessment     Procedure:  Gastric Bypass  Here for medical weight management: yes  Revision:  n/a  Surgery Date:  TBD (oct-dec)  Medical Diagnosis:   obesity grade  II    Labs:  Lab Results   Component Value Date    GLU 97 06/29/2023    BUN 12 06/29/2023    BUNCREA 15.0 06/29/2023    CREATSERUM 0.80 06/29/2023    ANIONGAP 6 06/29/2023    GFR 96 05/01/2017    GFRNAA 85 03/25/2022    GFRAA 98 03/25/2022    CA 9.2 06/29/2023    OSMOCALC 288 06/29/2023    ALKPHO 79 06/29/2023    AST 9 (L) 06/29/2023    ALT 18 06/29/2023    BILT 0.4 06/29/2023    TP 7.6 06/29/2023    ALB 3.7 06/29/2023    GLOBULIN 3.9 06/29/2023    AGRATIO 1.6 01/07/2016     06/29/2023    K 4.5 06/29/2023     06/29/2023    CO2 21.0 06/29/2023     Lab Results   Component Value Date    MG 2.0 03/25/2022      Phosphorus (mg/dL)   Date Value   03/26/2022 1.6 (L)   03/25/2022 1.1 (L)   03/25/2022 1.2 (L)       Thyroid:    Lab Results   Component Value Date    TSH 1.420 06/29/2023    TSH 0.554 03/24/2022    TSH 0.455 06/01/2021    T4F 0.9 06/29/2023    T4F 1.0 06/01/2021    T4F 0.8 12/22/2020       Iron Panel:  Lab Results Component Value Date    IRON 62 06/29/2023    TRANSFERRIN 285 06/29/2023    TIBCP 425 06/29/2023    SAT 15 06/29/2023       CBC:  Lab Results   Component Value Date    WBC 6.3 06/29/2023    WBC 4.6 03/17/2023    WBC 6.4 08/15/2022     Lab Results   Component Value Date    HEMOGLOBIN 10.7 (L) 01/07/2016    HGB 12.4 06/29/2023    HGB 11.3 (L) 03/17/2023    HGB 7.9 (L) 08/15/2022      Lab Results   Component Value Date    .0 06/29/2023    .0 03/17/2023    .0 08/15/2022       Diabetes:    Lab Results   Component Value Date     06/29/2023    A1C 5.1 06/29/2023       Lipid Panel:  Lab Results   Component Value Date    CHOLEST 164 06/29/2023    TRIG 188 (H) 06/29/2023    HDL 65 (H) 06/29/2023    LDL 68 06/29/2023    VLDL 28 06/29/2023    NONHDLC 99 06/29/2023        Vitamins/Minerals:  Lab Results   Component Value Date    B12 267 06/29/2023     Lab Results   Component Value Date    VITD 27.1 (L) 06/29/2023     No results found for: THIAMINE   Lab Results   Component Value Date/Time    VITB1 159.3 06/29/2023 09:44 AM     Lab Results   Component Value Date/Time    FOLIC 07.6 90/00/0398 15:77 AM        Meds:     Current Outpatient Medications:     QUEtiapine 25 MG Oral Tab, Take 1-2 tablets (25-50 mg total) by mouth nightly., Disp: 30 tablet, Rfl: 2    clonazePAM (KLONOPIN) 0.5 MG Oral Tab, Take 1 to 2 tablets (0.5mg to 1mg) by mouth twice daily, Disp: 120 tablet, Rfl: 2    FLUoxetine 40 MG Oral Cap, Take 1 capsule (40 mg total) by mouth daily. , Disp: 90 capsule, Rfl: 1    Phentermine HCl 37.5 MG Oral Tab, Take 1 tablet (37.5 mg total) by mouth every morning before breakfast., Disp: 30 tablet, Rfl: 2    drospirenone-ethinyl estradiol (OCELLA) 3-0.03 MG Oral Tab, Take 1 tablet by mouth daily. , Disp: 28 tablet, Rfl: 2    ubrogepant (UBRELVY) 100 MG Oral Tab, Take 100 mg by mout may repeat once 2 hours later in same day max 2 per 24 hours. , Disp: 10 tablet, Rfl: 2    topiramate 25 MG Oral Tab, Take 2 tablets (50 mg total) by mouth 2 (two) times daily. , Disp: 120 tablet, Rfl: 2    apixaban 2.5 MG Oral Tab, Take 1 tablet (2.5 mg total) by mouth 2 (two) times daily. , Disp: 60 tablet, Rfl: 11    apixaban (ELIQUIS) 2.5 MG Oral Tab, Take 1 tablet (2.5 mg total) by mouth 2 (two) times daily. , Disp: 30 tablet, Rfl: 0    prochlorperazine (COMPAZINE) 10 mg tablet, Take 1 tablet (10 mg total) by mouth every 6 (six) hours as needed for Nausea., Disp: 30 tablet, Rfl: 3    Naproxen Sodium (ALEVE OR), Take by mouth. Pt reports to taking this more than advised (Patient not taking: Reported on 8/3/2023), Disp: , Rfl:     OMEPRAZOLE 40 MG Oral Capsule Delayed Release, Take 1 capsule by mouth once daily, Disp: 30 capsule, Rfl: 0    Hydroquinone 4 % External Cream, Apply topically 2 (two) times daily as needed. (Patient not taking: Reported on 8/3/2023), Disp: , Rfl:     albuterol (PROAIR HFA) 108 (90 Base) MCG/ACT Inhalation Aero Soln, Inhale 2 puffs into the lungs every 6 (six) hours as needed for Wheezing., Disp: 3 each, Rfl: 0    albuterol (2.5 MG/3ML) 0.083% Inhalation Nebu Soln, Take 3 mL (2.5 mg total) by nebulization every 4 (four) hours as needed for Wheezing., Disp: 15 each, Rfl: 2    BIOTIN OR, Take 1 tablet by mouth 2 (two) times a day., Disp: , Rfl:     Fluticasone Furoate-Vilanterol 200-25 MCG/INH Inhalation Aerosol Powder, Breath Activated, Inhale 1 puff into the lungs daily. , Disp: 1 each, Rfl: 0    cetirizine 10 MG Oral Tab, Take 1 tablet (10 mg total) by mouth in the morning, at noon, and at bedtime. , Disp: , Rfl:     Height:  Ht Readings from Last 1 Encounters:  08/03/23 : 5' 7\" (1.702 m)    Weight:  Wt Readings from Last 6 Encounters:  08/03/23 : 253 lb 14.4 oz (115.2 kg)  06/29/23 : 255 lb 6.4 oz (115.8 kg)  06/14/23 : 257 lb 8 oz (116.8 kg)  06/13/23 : 260 lb (117.9 kg)  04/11/23 : 250 lb (113.4 kg)  04/11/23 : 257 lb (116.6 kg)      Weight change:  -2.9# since 6/29/23  Post-Op Excess Body Weight Loss: n/a% EBWL  BMI: There is no height or weight on file to calculate BMI. Protein Intake: 66-67-77-82 grams/day  Fluid intake:  64+ ounces/day  CHO intake: 21--155g/day    Diet history: my net diary       Breakfast      AM Snack       Lunch     PM Snack     Dinner   2C cottage cheese, oranges    premier skip [place] ([facility]) jagdeep 1/2    Protein shake    1/2 buffalo chicken wrap    skip skip [place], veggies    [place]) jagdeep 1/2    Pulled pork, 1 ciabatta    Fried shrimp, french fries    Porkchop, veggies    Fruit salad, 1/2 buffalo chicken wrap     Decaf coffee now: 1 diet soda in the past month    Total Calories:  <1100 calories/day  Excessive in: nothing  Inadequate in:  protein and fruits     Patient has made some modifications and adjustments to diet: yes, got a different water bottle without a straw, chewing well, eating slower, drinking 30 mins before and after, staying away from pasta, less fried foods  Food intolerances:  spicy food-heartburn  Vitamin/mineral supplements:  Bariatric Choice and Vit D  Protein supplements:  Premier Protein     Activity Level: moderate  Type: walk and other:  3x/week (susan/ginny valentin)    Other:  Met with patient for pre-op follow-up appointment. Patient has started keeping a food record with MND, however not consistent. Reviewed log, and patient is meeting protein needs many days and typically keeps her carbs below 120g/day. Patient does skip lunch sometimes. Reports the phentermine makes her not have an appetite. Discussed the need to eat a protein rich snack at least instead of skipping. Reviewed the healthy snack list handout. Patient has no issues getting in water intake. Has had 1 diet soda since LOV. Has switched to decaf. Patient has been staying away from pasta and less fried foods. Patient has been practicing eating strategies. Started taking vit D and is going to start bariatric choice chewable 1 per day. Patient is working with a  3x/week.  Congratulated patient on the great changes. Patient would like to increase fruit intake as well. Discussed ideas and provided recipes that are protein/produce focused. Did In[employer] with patient and reviewed. Forgot to get a copy of In[employer] report. Left VM and sent my chart message to patient to retreive a copy. Answered patient's questions and set goals. Scheduled follow-up. Nutrition Diagnosis     Nutrition Diagnosis: Morbid obesity: Unresolved     Intervention     Recommendations/goals:    1. Keep a food record, My Net Diary. 2.  Strive to consume at least 4-6 meals/snacks per day. Include protein and produce when you eat. Aim for 75-90 grams of protein per day. Try to keep the carbohydrates at 120 grams per day or less. 3.  Practice eating strategies, eat separately from drinking, avoid straws, chew food 20-30 times before swallowing. Make the meals last 30 minutes. -Continue  4. Continue to drink 64 oz per day of water. (Try  Protein water, adding True Lemon, Crystal Light). 5.  Taper caffeine and carbonation.-Done/Continue  6. Exercise and strength train with a goal of 30 minutes per day for exercise (for example,walking). Strength training 10 minutes 3 days per week. -Continue  7. Do the quizzes in the binder, pages 18-24. Monitor/Evaluate     Anthropometric measurements, Food/fluid intake/choices, Food intolerances, Activity level, Vitamin/mineral supplementation, Reinforce goals, and Calorie/protein intake  Additional RD visits required to review concepts? yes  Patient understands protein requirements? yes  Patient understand fluid requirements (amount and method of intake)? yes  Patient understands post-operative diet? Yes, revisit  Patient keeping consistent food records? no  Patient ready for Liquid Protein Education?  no      Etelvina Dewitt RD, LDN  Face-to-face time spent with pt: 45 minutes

## 2023-08-14 ENCOUNTER — TELEPHONE (OUTPATIENT)
Dept: SURGERY | Facility: CLINIC | Age: 38
End: 2023-08-14

## 2023-08-14 NOTE — TELEPHONE ENCOUNTER
Left VM for patient regarding RD visit on 8/11/23. Need a copy of [employer] body comp results for chart. Forgot to make a copy to be scanned into chart.  Patient has original.

## 2023-09-04 DIAGNOSIS — G43.019 INTRACTABLE MIGRAINE WITHOUT AURA AND WITHOUT STATUS MIGRAINOSUS: ICD-10-CM

## 2023-09-04 DIAGNOSIS — Z30.41 ENCOUNTER FOR BIRTH CONTROL PILLS MAINTENANCE: ICD-10-CM

## 2023-09-05 ENCOUNTER — OFFICE VISIT (OUTPATIENT)
Dept: PULMONOLOGY | Facility: CLINIC | Age: 38
End: 2023-09-05

## 2023-09-05 VITALS
DIASTOLIC BLOOD PRESSURE: 86 MMHG | BODY MASS INDEX: 40 KG/M2 | OXYGEN SATURATION: 99 % | HEART RATE: 103 BPM | WEIGHT: 255 LBS | SYSTOLIC BLOOD PRESSURE: 135 MMHG

## 2023-09-05 DIAGNOSIS — R91.1 PULMONARY NODULE, RIGHT: ICD-10-CM

## 2023-09-05 DIAGNOSIS — Z86.711 HISTORY OF PULMONARY EMBOLISM: ICD-10-CM

## 2023-09-05 DIAGNOSIS — J45.20 MILD INTERMITTENT ASTHMA WITHOUT COMPLICATION: ICD-10-CM

## 2023-09-05 DIAGNOSIS — Z01.811 ENCOUNTER FOR PREOPERATIVE PULMONARY EXAMINATION: Primary | ICD-10-CM

## 2023-09-05 RX ORDER — UBROGEPANT 100 MG/1
TABLET ORAL
Qty: 10 TABLET | Refills: 2 | Status: SHIPPED | OUTPATIENT
Start: 2023-09-05

## 2023-09-05 RX ORDER — DROSPIRENONE AND ETHINYL ESTRADIOL 0.03MG-3MG
1 KIT ORAL DAILY
Qty: 28 TABLET | Refills: 2 | Status: SHIPPED | OUTPATIENT
Start: 2023-09-05

## 2023-09-06 NOTE — TELEPHONE ENCOUNTER
A refill request was received for:  Requested Prescriptions     Pending Prescriptions Disp Refills    drospirenone-ethinyl estradiol (OCELLA) 3-0.03 MG Oral Tab 28 tablet 2     Sig: Take 1 tablet by mouth daily. ubrogepant (UBRELVY) 100 MG Oral Tab 10 tablet 2     Sig: Take 100 mg by mout may repeat once 2 hours later in same day max 2 per 24 hours.        Last refill date:   04/11/2023,     Last office visit: 04/11/2023    Follow up due:  Future Appointments   Date Time Provider Matthew Lala   9/6/2023  7:30 PM DARY Mcdermott [facility]   9/9/2023 10:00 AM  XR RM1 ([address] [place] [address]) [id number] [facility]   9/12/2023  2:00 PM Yuri Valladares MD [address] [facility]   9/12/2023  2:30 PM Ekaterina Arroyo, [address] [facility]   11/28/2023  3:00 PM DARY Gaffney [facility]

## 2023-09-09 ENCOUNTER — HOSPITAL ENCOUNTER (OUTPATIENT)
Dept: GENERAL RADIOLOGY | Facility: HOSPITAL | Age: 38
Discharge: HOME | End: 2023-09-09
Attending: SURGERY
Payer: COMMERCIAL

## 2023-09-09 DIAGNOSIS — K21.9 GERD WITHOUT ESOPHAGITIS: ICD-10-CM

## 2023-09-09 PROCEDURE — 74240 X-RAY XM UPR GI TRC 1CNTRST: CPT | Performed by: SURGERY

## 2023-09-12 ENCOUNTER — OFFICE VISIT (OUTPATIENT)
Dept: SURGERY | Facility: CLINIC | Age: 38
End: 2023-09-12
Payer: COMMERCIAL

## 2023-09-12 VITALS
WEIGHT: 252 LBS | HEART RATE: 118 BPM | SYSTOLIC BLOOD PRESSURE: 138 MMHG | HEIGHT: 67 IN | OXYGEN SATURATION: 95 % | DIASTOLIC BLOOD PRESSURE: 82 MMHG | BODY MASS INDEX: 39.55 KG/M2

## 2023-09-12 DIAGNOSIS — K21.9 GERD WITHOUT ESOPHAGITIS: Primary | ICD-10-CM

## 2023-09-12 DIAGNOSIS — E66.9 OBESITY (BMI 30-39.9): Primary | ICD-10-CM

## 2023-09-12 DIAGNOSIS — E66.01 MORBID OBESITY WITH BMI OF 40.0-44.9, ADULT (HCC): ICD-10-CM

## 2023-09-12 DIAGNOSIS — F43.9 STRESS: ICD-10-CM

## 2023-09-12 DIAGNOSIS — E53.8 B12 DEFICIENCY: ICD-10-CM

## 2023-09-12 PROCEDURE — 3075F SYST BP GE 130 - 139MM HG: CPT | Performed by: INTERNAL MEDICINE

## 2023-09-12 PROCEDURE — 97803 MED NUTRITION INDIV SUBSEQ: CPT | Performed by: DIETITIAN, REGISTERED

## 2023-09-12 PROCEDURE — 96372 THER/PROPH/DIAG INJ SC/IM: CPT | Performed by: INTERNAL MEDICINE

## 2023-09-12 PROCEDURE — 3079F DIAST BP 80-89 MM HG: CPT | Performed by: INTERNAL MEDICINE

## 2023-09-12 PROCEDURE — 99214 OFFICE O/P EST MOD 30 MIN: CPT | Performed by: INTERNAL MEDICINE

## 2023-09-12 PROCEDURE — 3008F BODY MASS INDEX DOCD: CPT | Performed by: INTERNAL MEDICINE

## 2023-09-12 RX ORDER — CYANOCOBALAMIN 1000 UG/ML
1000 INJECTION, SOLUTION INTRAMUSCULAR; SUBCUTANEOUS
Qty: 1 EACH | Refills: 6 | Status: SHIPPED | OUTPATIENT
Start: 2023-09-12

## 2023-09-12 RX ORDER — CYANOCOBALAMIN 1000 UG/ML
1000 INJECTION, SOLUTION INTRAMUSCULAR; SUBCUTANEOUS ONCE
Status: COMPLETED | OUTPATIENT
Start: 2023-09-12 | End: 2023-09-12

## 2023-09-12 RX ADMIN — CYANOCOBALAMIN 1000 MCG: 1000 INJECTION, SOLUTION INTRAMUSCULAR; SUBCUTANEOUS at 14:57:00

## 2023-09-14 ENCOUNTER — TELEPHONE (OUTPATIENT)
Dept: HEMATOLOGY/ONCOLOGY | Facility: HOSPITAL | Age: 38
End: 2023-09-14

## 2023-10-07 ENCOUNTER — HOSPITAL ENCOUNTER (EMERGENCY)
Facility: HOSPITAL | Age: 38
Discharge: HOME | End: 2023-10-07
Attending: EMERGENCY MEDICINE

## 2023-10-07 VITALS
RESPIRATION RATE: 16 BRPM | OXYGEN SATURATION: 98 % | TEMPERATURE: 99 F | HEART RATE: 82 BPM | DIASTOLIC BLOOD PRESSURE: 86 MMHG | SYSTOLIC BLOOD PRESSURE: 139 MMHG

## 2023-10-07 DIAGNOSIS — M54.42 BILATERAL LOW BACK PAIN WITH LEFT-SIDED SCIATICA, UNSPECIFIED CHRONICITY: Primary | ICD-10-CM

## 2023-10-07 PROCEDURE — 99284 EMERGENCY DEPT VISIT MOD MDM: CPT

## 2023-10-07 PROCEDURE — 96374 THER/PROPH/DIAG INJ IV PUSH: CPT

## 2023-10-07 PROCEDURE — 96375 TX/PRO/DX INJ NEW DRUG ADDON: CPT

## 2023-10-07 RX ORDER — HYDROCODONE BITARTRATE AND ACETAMINOPHEN 5; 325 MG/1; MG/1
1-2 TABLET ORAL EVERY 4 HOURS PRN
Qty: 15 TABLET | Refills: 0 | Status: SHIPPED | OUTPATIENT
Start: 2023-10-07

## 2023-10-07 RX ORDER — MORPHINE SULFATE 4 MG/ML
4 INJECTION, SOLUTION INTRAMUSCULAR; INTRAVENOUS ONCE
Status: COMPLETED | OUTPATIENT
Start: 2023-10-07 | End: 2023-10-07

## 2023-10-07 RX ORDER — METHYLPREDNISOLONE 4 MG/1
TABLET ORAL
Qty: 1 EACH | Refills: 0 | Status: SHIPPED | OUTPATIENT
Start: 2023-10-07

## 2023-10-07 RX ORDER — CYCLOBENZAPRINE HCL 10 MG
10 TABLET ORAL 3 TIMES DAILY PRN
Qty: 20 TABLET | Refills: 0 | Status: SHIPPED | OUTPATIENT
Start: 2023-10-07 | End: 2023-10-14

## 2023-10-07 RX ORDER — KETOROLAC TROMETHAMINE 15 MG/ML
30 INJECTION, SOLUTION INTRAMUSCULAR; INTRAVENOUS ONCE
Status: COMPLETED | OUTPATIENT
Start: 2023-10-07 | End: 2023-10-07

## 2023-10-07 RX ORDER — DIAZEPAM 5 MG/ML
5 INJECTION, SOLUTION INTRAMUSCULAR; INTRAVENOUS ONCE
Status: COMPLETED | OUTPATIENT
Start: 2023-10-07 | End: 2023-10-07

## 2023-10-08 NOTE — DISCHARGE INSTRUCTIONS
Take steroid pack and Flexeril as prescribed. Take Norco as needed for worsening pain. Follow-up with your primary physician for reevaluation early next week. Return to the emergency department if increasing pain, focal weakness, incontinence, or other new symptoms develop.

## 2023-10-08 NOTE — ED INITIAL ASSESSMENT (HPI)
\"Threw out my back yesterday while I was moving. \" History of back pain. Pain in left lower back that radiates to middle back and down both legs.

## 2023-10-15 DIAGNOSIS — R11.0 NAUSEA: ICD-10-CM

## 2023-10-16 RX ORDER — PROCHLORPERAZINE MALEATE 10 MG
10 TABLET ORAL EVERY 6 HOURS PRN
Qty: 30 TABLET | Refills: 3 | OUTPATIENT
Start: 2023-10-16

## 2023-10-18 ENCOUNTER — HOSPITAL ENCOUNTER (OUTPATIENT)
Facility: HOSPITAL | Age: 38
Setting detail: HOSPITAL OUTPATIENT SURGERY
Discharge: HOME | End: 2023-10-18
Attending: INTERNAL MEDICINE | Admitting: INTERNAL MEDICINE
Payer: COMMERCIAL

## 2023-10-18 ENCOUNTER — ANESTHESIA EVENT (OUTPATIENT)
Dept: ENDOSCOPY | Facility: HOSPITAL | Age: 38
End: 2023-10-18
Payer: COMMERCIAL

## 2023-10-18 ENCOUNTER — ANESTHESIA (OUTPATIENT)
Dept: ENDOSCOPY | Facility: HOSPITAL | Age: 38
End: 2023-10-18
Payer: COMMERCIAL

## 2023-10-18 VITALS
SYSTOLIC BLOOD PRESSURE: 130 MMHG | RESPIRATION RATE: 16 BRPM | HEIGHT: 67 IN | OXYGEN SATURATION: 100 % | WEIGHT: 245 LBS | BODY MASS INDEX: 38.45 KG/M2 | TEMPERATURE: 98 F | DIASTOLIC BLOOD PRESSURE: 63 MMHG | HEART RATE: 66 BPM

## 2023-10-18 DIAGNOSIS — D50.9 IRON DEFICIENCY ANEMIA, UNSPECIFIED IRON DEFICIENCY ANEMIA TYPE: ICD-10-CM

## 2023-10-18 PROCEDURE — 0DB68ZX EXCISION OF STOMACH, VIA NATURAL OR ARTIFICIAL OPENING ENDOSCOPIC, DIAGNOSTIC: ICD-10-PCS | Performed by: INTERNAL MEDICINE

## 2023-10-18 PROCEDURE — 0DB78ZX EXCISION OF STOMACH, PYLORUS, VIA NATURAL OR ARTIFICIAL OPENING ENDOSCOPIC, DIAGNOSTIC: ICD-10-PCS | Performed by: INTERNAL MEDICINE

## 2023-10-18 PROCEDURE — 0DBL8ZX EXCISION OF TRANSVERSE COLON, VIA NATURAL OR ARTIFICIAL OPENING ENDOSCOPIC, DIAGNOSTIC: ICD-10-PCS | Performed by: INTERNAL MEDICINE

## 2023-10-18 PROCEDURE — 88305 TISSUE EXAM BY PATHOLOGIST: CPT | Performed by: INTERNAL MEDICINE

## 2023-10-18 PROCEDURE — 0DB98ZX EXCISION OF DUODENUM, VIA NATURAL OR ARTIFICIAL OPENING ENDOSCOPIC, DIAGNOSTIC: ICD-10-PCS | Performed by: INTERNAL MEDICINE

## 2023-10-18 RX ORDER — SODIUM CHLORIDE, SODIUM LACTATE, POTASSIUM CHLORIDE, CALCIUM CHLORIDE 600; 310; 30; 20 MG/100ML; MG/100ML; MG/100ML; MG/100ML
INJECTION, SOLUTION INTRAVENOUS CONTINUOUS
Status: DISCONTINUED | OUTPATIENT
Start: 2023-10-18 | End: 2023-10-18

## 2023-10-18 RX ORDER — PROCHLORPERAZINE EDISYLATE 5 MG/ML
5 INJECTION INTRAMUSCULAR; INTRAVENOUS EVERY 8 HOURS PRN
Status: DISCONTINUED | OUTPATIENT
Start: 2023-10-18 | End: 2023-10-18

## 2023-10-18 RX ORDER — LIDOCAINE HYDROCHLORIDE 10 MG/ML
INJECTION, SOLUTION EPIDURAL; INFILTRATION; INTRACAUDAL; PERINEURAL AS NEEDED
Status: DISCONTINUED | OUTPATIENT
Start: 2023-10-18 | End: 2023-10-18 | Stop reason: SURG

## 2023-10-18 RX ORDER — ONDANSETRON 2 MG/ML
4 INJECTION INTRAMUSCULAR; INTRAVENOUS AS NEEDED
Status: DISCONTINUED | OUTPATIENT
Start: 2023-10-18 | End: 2023-10-18

## 2023-10-18 RX ORDER — NALOXONE HYDROCHLORIDE 0.4 MG/ML
80 INJECTION, SOLUTION INTRAMUSCULAR; INTRAVENOUS; SUBCUTANEOUS AS NEEDED
Status: DISCONTINUED | OUTPATIENT
Start: 2023-10-18 | End: 2023-10-18

## 2023-10-18 RX ADMIN — LIDOCAINE HYDROCHLORIDE 30 MG: 10 INJECTION, SOLUTION EPIDURAL; INFILTRATION; INTRACAUDAL; PERINEURAL at 13:43:00

## 2023-10-18 RX ADMIN — SODIUM CHLORIDE, SODIUM LACTATE, POTASSIUM CHLORIDE, CALCIUM CHLORIDE: 600; 310; 30; 20 INJECTION, SOLUTION INTRAVENOUS at 13:40:00

## 2023-10-18 RX ADMIN — SODIUM CHLORIDE, SODIUM LACTATE, POTASSIUM CHLORIDE, CALCIUM CHLORIDE: 600; 310; 30; 20 INJECTION, SOLUTION INTRAVENOUS at 14:22:00

## 2023-10-18 NOTE — DISCHARGE INSTRUCTIONS

## 2023-10-18 NOTE — ANESTHESIA POSTPROCEDURE EVALUATION
[address] [facility] [address] [facility] Patient Status:  Hospital Outpatient Surgery   Age/Gender 34year old female MRN [id number]   Location [address] [facility] 28 Attending Navya Lal, [address] [facility] Day # 0 PCP Yudith Glaser DO       Anesthesia Post-op Note    ESOPHAGOGASTRODUODENOSCOPY (EGD)  with biopsies and colonoscopy with cold snare polypectomy    Procedure Summary       Date: 10/18/23 Room / Location: [facility] ENDOSCOPY 02 / 1404 [facility] ENDOSCOPY    Anesthesia Start: 5841 Anesthesia Stop: 1576    Procedures:       ESOPHAGOGASTRODUODENOSCOPY (EGD)  with biopsies and colonoscopy with cold snare polypectomy      COLONOSCOPY Diagnosis:       Iron deficiency anemia, unspecified iron deficiency anemia type      (EGD: esophagitis colon: polyp, diverticulosis, hemorrhoids)    Surgeons: Navya Lal DO Anesthesiologist: Ilda Krause MD    Anesthesia Type: MAC ASA Status: 2            Anesthesia Type: MAC    Vitals Value Taken Time   /81 10/18/23 1423   Temp  10/18/23 1423   Pulse 88 10/18/23 1423   Resp 16 10/18/23 1423   SpO2 100 10/18/23 1423       Patient Location: Endoscopy    Anesthesia Type: MAC    Airway Patency: patent    Postop Pain Control: adequate    Mental Status: mildly sedated but able to meaningfully participate in the post-anesthesia evaluation    Nausea/Vomiting: none    Cardiopulmonary/Hydration status: stable euvolemic    Complications: no apparent anesthesia related complications    Postop vital signs: stable    Dental Exam: Unchanged from Postop

## 2023-10-18 NOTE — OPERATIVE REPORT
Operative Report-Esophagogastroduodenoscopy with cold biopsies   Damaris Luigi [facility]. 10/15/1985   [facility] [id number] MRN [id number]   Admission Date 10/18/2023 Operation Date 10/18/2023   Attending Physician Tatianna Cote DO Operating Physician Missy Galarza DO     PREOPERATIVE DIAGNOSIS/INDICATION: N/V, BEN  POSTOPERTATIVE DIAGNOSIS: LA Class A Esophagitis   PROCEDURE PERFORMED: EGD  INFORMED CONSENT: Once a brief history and physical examination was performed, the risks, benefits and alternatives to the procedure were discussed with the patient and/or family and informed consent was obtained. The risks of sedation, perforation, missed lesions and need for surgery were all discussed. Patient expressed understanding of the risks and agreed to proceed. PROCEDURE DESCRIPTION:  The patient was then brought to the endoscopy suite where her pulse, pulse oximetry and blood pressure were monitored. She was placed in the left lateral decubitus position and deep sedation was administered. Once adequate sedation was achieved, a bite block was placed and a lubricated tip of an Olympus video upper endoscope was inserted through the oropharynx and gently manipulated through the esophagus into the stomach and the distal duodenum. Upon withdrawal of the endoscope, careful visualization of the mucosa was performed. FINDINGS:  ESOPHAGUS: Normal.   EGJ: The SCJ was located at the [facility] and was overall regular. There was mild erythema suggestive of LA Class A esophagitis. STOMACH: Mild erythema of the antrum and body. DUODENUM: Normal.   THERAPEUTICS: Biopsies were performed from the duodenum, antrum/body with a cold biopsy forceps. RECOMMENDATIONS:   Post EGD precautions, watch for bleeding, infection, perforation, adverse drug reactions   Follow biopsies. Avoid NSAIDs. Continue Omeprazole 40 mg/day for now.    CC Report:     Missy Galarza DO  10/18/2023  1:51 PM
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
## 2023-10-18 NOTE — OPERATIVE REPORT
Operative Report-Colonoscopy with snare polypectomy    Zulay Osorio 10/15/1985   [facility] [id number] MRN [id number]   Admission Date 10/18/2023 Operation Date 10/18/2023   Attending Physician Anatoliy Barron DO Operating Physician Edy Henderson DO     PREOPERATIVE DIAGNOSIS/INDICATION: BEN  POSTOPERTATIVE DIAGNOSIS: Polyp, Diverticulosis, Hemorrhoids  PROCEDURE PERFORMED: COLONOSCOPY  INFORMED CONSENT:  Once a brief history and physical examination was performed, the risks, benefits and alternatives to the procedure were discussed with the patient and/or family and informed consent was obtained. The risks of sedation, perforation, missed lesions and need for surgery were all discussed. Patient expressed understanding of the risks and agreed to proceed. PROCEDURE DESCRIPTION:The patient was then brought to the endoscopy suite where her pulse, pulse oximetry and blood pressure were monitored. She was placed in the left lateral decubitus position and deep sedation was administered. Once adequate sedation was achieved, a rectal exam was performed which was normal. A lubricated tip of an Olympus video colonoscope was then inserted through the rectum and gently manipulated under direct visualization to the cecal pole. The quality of the preparation was fair. Upon withdrawal of the colonoscope, careful visualization of the mucosa was performed. [place] Prep Score: Right Colon 2 Transverse colon 2 Left colon 2  FINDINGS/THERAPEUTICS:  TERMINAL ILEUM: Unable to intubate the terminal ileum. COLON: There was adherent stool in the colon and much irrigation and suctioning was required. Small or flat polyps could have been missed. There was scattered diverticulosis throughout the colon. There was a 5 mm, sessile polyp in the transverse colon which was removed with the cold snare. Two passes were made to the right colon. RECTUM: Moderate Grade II Internal hemorrhoids.   RECOMMENDATIONS:   Post Colonoscopy/polypectomy precautions, watch for bleeding, infection, perforation, adverse drug reactions   Follow biopsies. MRE if SBFT is negative  Repeat colonoscopy in 2 years with a 1 day extended prep.   CC Report:   Rachel Peters DO  10/18/2023  2:20 PM

## 2023-10-23 ENCOUNTER — DOCUMENTATION ONLY (OUTPATIENT)
Dept: SURGERY | Facility: CLINIC | Age: 38
End: 2023-10-23

## 2023-10-25 ENCOUNTER — OFFICE VISIT (OUTPATIENT)
Dept: SURGERY | Facility: CLINIC | Age: 38
End: 2023-10-25

## 2023-10-25 VITALS — WEIGHT: 252.88 LBS | HEIGHT: 67 IN | BODY MASS INDEX: 39.69 KG/M2

## 2023-10-25 DIAGNOSIS — K21.9 GERD WITHOUT ESOPHAGITIS: ICD-10-CM

## 2023-10-25 DIAGNOSIS — R79.89 LOW VITAMIN D LEVEL: ICD-10-CM

## 2023-10-25 DIAGNOSIS — E66.9 OBESITY (BMI 30-39.9): Primary | ICD-10-CM

## 2023-10-25 DIAGNOSIS — R79.89 LOW VITAMIN B12 LEVEL: ICD-10-CM

## 2023-10-25 PROCEDURE — 97803 MED NUTRITION INDIV SUBSEQ: CPT

## 2023-10-25 PROCEDURE — 3008F BODY MASS INDEX DOCD: CPT

## 2023-10-25 NOTE — PATIENT INSTRUCTIONS
Recommendations/goals:       1. Keep a food record, My Net Diary.-Continue  2. Strive to consume at least 4-6 meals/snacks per day. Include protein and produce when you eat. Aim for 75-90 grams of protein per day. Try to keep the carbohydrates at 120 grams per day or less. (Try edamame spaghetti and try recipes for getting in protein with produce)  3. Continue to practice eating strategies, eat separately from drinking, avoid straws, chew food 20-30 times before swallowing. Make the meals last 30 minutes. -Continue  4. Continue to drink 64 oz per day of water. (Try  Protein water, adding True Lemon, Crystal Light). 5. Exercise and strength train with a goal of 30 minutes per day for exercise (for example,walking). Strength training 10 minutes 3 days per week. -Continue  6. Do the quizzes in the binder, pages 18-24.  7. Continue to bring healthy snack high protein ideas for lunch  8.  Try sampling 20g protein shakes ([facility] lean shake)

## 2023-10-30 NOTE — PROGRESS NOTES
Gavin Laura / Isac Fishman / Mik Bush / Nevin Molina / Jada Diaz / Claudia Ashraf - [phone]  Answering Service [phone]          PCP: Carmen Miller: Magalis        6/13/23   260.0     40.7  8/2/23     253.9     39.8  11/2/23   247.0     38.7        History of Present Illness:  Earline Duarte is a 34year [de-identified] old female presenting for evaluation for bariatric surgery. UNDECIDED - concern about reflux vs. NSAID use. Leaning towards Telma-en-Y gastric bypass     6/13/23  Has struggled with her weight her entire life. Has seen Dr. Melva Stack - gets phentermine from PCP now. Has tried other meds - topiramate, metformin, DEP, only taking topiramate  On phentermine on and off for the past 5 years or so. Felt like it helped at the begininng - lost about 60# along with other drugs, but she can't remember which ones. Went off it for awhile and gained weight back. Has thought about WLS surgery for some time. Was discouraged by Dr. Melva Stack     Has had GERD for 20 years or so. Does note some chronic RUQ abdominal pain as well. Describes as a chest pressure, happens daily, but not taking PPI daily now, but when she does take it it only helps minimally. Some globus sensation with soda. Diet: Carb heavy  Breakfast: bagels to bowl of cereal or breakfast sandwich - usually FF  Lunch: Skips if she eats breakfast  Dinner: pasta, sometimes chicken salad. 50/50 - eating out / takeout vs. Cooking at home        8/2/23 - Logging on a regular basis. Exercise: goes to gym 3x/week -working with  -  susan / Viktoriya valentin     Pt continues to have occasional nausea, intermittent vomiting - takes compazine 1-2x/week. Saw GI years ago, last EGD was years ago. No obvious cause has been found, unsure what kind of work-up has been shown.   No obvious triggers with food actually, finds that nausea and intermittent epigastric discomfort more often happens on an empty stomach. Continues to have occasional heartburn symptoms. Only takes PPI occasionally but has symptoms she says almost daily. Next visit:   Continue logging, focus on processed carbs. GI eval, upper GI for nausea/vomiting     Dietitian  Cards  Pulm  Psych  GI  [facility]       11/2/23 - logging on a regular basis. Meeting protein goals, some fruits/vegetables - discussed increasing. Carbs in range most days, some days a bit high. Taking PPI before dinner. Nausea/emesis still present, less often. She still throwing up/dry heaving, about once/week. No obvious pattern to her symptoms or triggering factors. Has seen GI and got endoscopy so far objective evidence is only concerning for mild reflux without significant esophagitis or Hannon's. Has follow-up with the GI nurse practitioner at the end of the month. Had recent back issue and had to be on a pulsed course of steroids, she is off that and that was done at the beginning of October. Exercise:  Walks 1-2 miles / day with dogs. boxing/ginny fu 3x/week, added weights 2x/week. Dietitian: Mitra Lal  6/29/23 - Sees Dr. Feliciano Hillman for weight management. Taking phentermine for weightloss. Patient has struggled with weight her whole life. Has tried many diet. Most successful with medications and , however regained the weight 12 months later. Patient's triggers are carb heavy foods. Per diet recall, patient eats usually 3 meals per day. Patient has been working on eating a lower carb diet since starting this process. Has eliminated caffeine and carbonation. Has been drinking 64oz of water per day. Provided healthy snack list to help lower carb intake and increase protein intake. Reviewed daily protein and carb goals. Discussed logging with MND. Patient tries to walk 1-1.5 miles per day with her dog and goes to the gym on [address]. Discussed increasing activity as tolerated.  Reviewed program binder and answered patient's questions. 8/11/323 Patient has started keeping a food record with MND, however not consistent. Reviewed log, and patient is meeting protein needs many days and typically keeps her carbs below 120g/day. Patient does skip lunch sometimes. Reports the phentermine makes her not have an appetite. Discussed the need to eat a protein rich snack at least instead of skipping. Reviewed the healthy snack list handout. Patient has no issues getting in water intake. Has had 1 diet soda since LOV. Has switched to decaf. Patient has been staying away from pasta and less fried foods. Patient has been practicing eating strategies. Started taking vit D and is going to start bariatric choice chewable 1 per day. Patient is working with a  3x/week. Congratulated patient on the great changes. Patient would like to increase fruit intake as well. Discussed ideas and provided recipes that are protein/produce focused. Did [employer] with patient and reviewed. 9/12/23 Patient is keeping a consistent food record with MND. Patient does tend to skip meals d/t forgetting or busy at work. Patient is not hitting protein needs most days. Reviewed options to bring for lunch to increase protein intake and avoid skipping. Patient is keeping carb intake <120g/day. Patient has been very carb conscious and will avoid pasta, breads, rice, etc. Patient has increased fruit and veggie intake. Patient has eliminated caffeine and carbonation. Very rarely has coke zero when she gets a migraine. Discussed other non-carbonated options. Patient has no issues getting in water intake. Patient reported she needs more practice with eating strategies, however is more conscious of them. Patient is still exercising 3x/week and is trying to walk more at work and taking the stairs. Discussed with patient the need to hit protein intake to be cleared. Patient understands. Discussed shakes to prepare for LPD.         10/25/23 - Met with pt for pre-op visit. Per pt is taking phentermine and feels that it is helping to control hunger. Per pt is keeping a food record and is consuming 1400 calories,  gm protein and  gm carbs per day. Pt provided with ideas and recipes to continue to include protein with produce while keeping carbs appropriately low. Pt verbalized understanding. Per pt has practiced eating separately from drinking is waiting 20-25 minutes after eating to drink, has practiced chewing well before swallowing,  is eating slowly, eliminated caffeine- used to drink Coke Zero and Death wish coffee- now decaf coffee and no soda, is eating 4 servings of vegetables per day and 2-3 servings of fruit per day. Per pt is drinking 64+ oz of water per day. Pt is active with boxing and Norval Cloud three days per week at 1-2.5 hours per time and doing 15-20 minutes of weights twice per week. Reviewed quizzes in binder. Pt scored A+ on all quizzes after review. Pt is ready for LPD instruction which is scheduled for December. Bar: Magalis  6/14/23 - preop appt scheduled, needs bloodwork, start phentermine     9/12/23 - GERD , stress, B12 shots for low B12        Psych: 7/12/23 - approved, but wants to check-in               -needs followup   10/15/23 - ok for surgery     Cards: 9/5/23 - Hansa Loss - Compensated cardiac status. Patient has no active angina or heart failure symptoms. She is low, acceptable risk to proceed with bariatric surgery with no additional cardiac testing needed. She may consider after her birthday in October a Ultrafast CT of calcium score for restratification considering that her father had heart disease in his 35s. She will probably come off of phentermine after surgery which I think is a good idea here. Follow-up with me in a year. [facility] - 9/5/23 - ok to hold eliquis prior to surgery, restart when appropriate. Stable incidental pulm nodel, ok for surgery.       GI: Hina   8/23/23 - Schedule EGD/Colonoscopy, omeprazole 40 1/2hr before to dinner               TTG Iga, Fecal fat, pancreatic elastase    EGD - mild esophageal erytehma, stomach,   C-scope - snare polypectomy transverse colon. Repeat in 2 years     Upper GI 9/9/23 - spontaneous reflux, no HH     Preop Labs  6/29/23 - Vit D 27, B12 267, all else good        Past Medical History:    Migraines  Severe back issues - spinal cord stimulator   Asthma  GERD  Depression / Anxiety  PE - 2021 - Dr. Theodore Diaz (gimenez)  Primary ovarian failure        Past Surgical History:    Spinal cord stimulator (2018)  Lap tito  Left Carpal tunnel  Left rotator cuff surgery     All:   Metformin - lactic acidosis  Others - reviewed     Social History:   No tobacco, occ ETOH, once/month, no MJ, no illicits  [employer] Director at [employer]        ROS:    Gen: No Fevers, chills, recent weight changes, night sweats  Pulm: No shortness of breath, coughing, sore throats  Cardiac: No chest pain, palpitaitons  GI: Negative except HPI  : No hematuria or dysuria  Heme: No history of bleeding or bruising, PE in 2021  MS: Chronic back pain - takes naproxen twice daily, some neck pain with radiation down right arm - due for MRI neck. Recent steroid pulse dose in early October. 11/02/23  0938   BP: 110/80   Pulse: 98     Wt Readings from Last 6 Encounters:  11/02/23 : 247 lb (112 kg)  10/25/23 : 252 lb 14.4 oz (114.7 kg)  10/18/23 : 245 lb (111.1 kg)  09/12/23 : 252 lb (114.3 kg)  09/05/23 : 255 lb (115.7 kg)  08/23/23 : 259 lb (117.5 kg)      General: Alert & Oriented x3, NAD  HEENT: anicteric, EOMI, oropharynx clear  Neck: no palpable masses, trachea midlien  Chest; clear BS b/l  CV: RR  Abd: well healed incisions, obese, nondistended, soft, maybe some mild epigastric tenderness       42yo female here for preop evaluation for bariatric surgery. Bypass vs. Sleeve? Naproxen/eliquis vs. GERD issues?   I discussed the pros and cons of each both in terms of globally with weight loss and some of the specific issues in her case with respect to some of her reflux symptoms, her unexplainable GI issues, as well as the back issues and the potential need for Naprosyn or steroids in the future. She assures me that the steroids were worn off and are extremely rare at this time and she has been off nonsteroidals and doing reasonably well. She is concerned about the reflux issue, so I do think that a bypass is reasonable to move forward with but did caution her about some of my concerns with the bypass just as I would have some concerns about the sleeve as well. I do want to touch base with her GI doctor to see if we have any more work-up to be done prior to moving forward with surgery. I also discussed how given that the gastric bypass is more of a change in her GI tract he can be a little bit more unpredictable in terms of how it will affect her GI symptoms moving forward. If I touch base with GI and they are okay moving forward with surgery without further testing, then I think we can probably be able to schedule her before the end of the year. However, I stressed that if there are any issues that need further work-up, then we should push this off till early next year.

## 2023-11-02 ENCOUNTER — OFFICE VISIT (OUTPATIENT)
Dept: SURGERY | Facility: CLINIC | Age: 38
End: 2023-11-02
Payer: COMMERCIAL

## 2023-11-02 VITALS
SYSTOLIC BLOOD PRESSURE: 110 MMHG | DIASTOLIC BLOOD PRESSURE: 80 MMHG | WEIGHT: 247 LBS | OXYGEN SATURATION: 99 % | HEART RATE: 98 BPM | BODY MASS INDEX: 38.77 KG/M2 | HEIGHT: 67 IN

## 2023-11-02 DIAGNOSIS — E66.01 MORBID OBESITY WITH BMI OF 40.0-44.9, ADULT (HCC): Primary | ICD-10-CM

## 2023-11-03 ENCOUNTER — TELEPHONE (OUTPATIENT)
Dept: NUTRITION/OBESITY MEDICINE | Facility: HOSPITAL | Age: 38
End: 2023-11-03

## 2023-11-03 NOTE — TELEPHONE ENCOUNTER
Spoke to Dr. Freddie Horne and then to patient. Still unclear etiology of persistant IBS / nausea/emesis symptoms. No obvious obstructive, or inflammatory cause. Probably some underlying functional disorder. Per Dr. Freddie Horne - will get NM Gastric emptying study as well as MR enterography. Presuming these don't show anything significant, can move forward with bariatric surgery, understanding the potential concerns I've discussed with patient regarding her symptoms and the unpredictability at which they will potentially improve or worsen, especially with the reconfiguration of the GI tract with a RYGB. Will tentatively plan RYGB on 12/18.      Pt has followup w GI scheduled and will followup w me scheduled on 12/7 to review workup and make a final determination of whether we will move forward with the surgery

## 2023-11-08 NOTE — PROGRESS NOTES
[address] [place] [address] [facility]  [address] [place] [id number]  Dept: [phone]  Loc: [phone]    11/08/23    Bariatric Liquid Protein Nutrition Session    Geoff Leon is a 34year old female    Assessment   Procedure:  Gastric Bypass  Revision: n/a  Surgery Date:  December 18,2023  Medical Diagnosis:  obesity grade II     Height:    Ht Readings from Last 1 Encounters:   11/02/23 5' 7\" (1.702 m)     Weight:    Wt Readings from Last 6 Encounters:   11/02/23 247 lb   10/25/23 252 lb 14.4 oz   10/18/23 245 lb   09/12/23 252 lb   09/05/23 255 lb   08/23/23 259 lb       Weight change:  down 0.7 lbs since last month  BMI: There is no height or weight on file to calculate BMI.     Lab Results:  Lab Results   Component Value Date    GLU 97 06/29/2023    BUN 12 06/29/2023    BUNCREA 15.0 06/29/2023    CREATSERUM 0.80 06/29/2023    ANIONGAP 6 06/29/2023    GFR 96 05/01/2017    GFRNAA 85 03/25/2022    GFRAA 98 03/25/2022    CA 9.2 06/29/2023    OSMOCALC 288 06/29/2023    ALKPHO 79 06/29/2023    AST 9 (L) 06/29/2023    ALT 18 06/29/2023    BILT 0.4 06/29/2023    TP 7.6 06/29/2023    ALB 3.7 06/29/2023    GLOBULIN 3.9 06/29/2023    AGRATIO 1.6 01/07/2016     06/29/2023    K 4.5 06/29/2023     06/29/2023    CO2 21.0 06/29/2023       Lab Results   Component Value Date    MG 2.0 03/25/2022     Phosphorus (mg/dL)   Date Value   03/26/2022 1.6 (L)   03/25/2022 1.1 (L)   03/25/2022 1.2 (L)        Thyroid:    Lab Results   Component Value Date    TSH 1.420 06/29/2023    TSH 0.554 03/24/2022    TSH 0.455 06/01/2021    T4F 0.9 06/29/2023    T4F 1.0 06/01/2021    T4F 0.8 12/22/2020       Iron Panel:  Lab Results   Component Value Date    IRON 62 06/29/2023    TRANSFERRIN 285 06/29/2023    TIBCP 425 06/29/2023    SAT 15 06/29/2023       CBC:  Lab Results   Component Value Date    WBC 6.3 06/29/2023    WBC 4.6 03/17/2023    WBC 6.4 08/15/2022     Lab Results Component Value Date    HEMOGLOBIN 10.7 (L) 01/07/2016    HGB 12.4 06/29/2023    HGB 11.3 (L) 03/17/2023    HGB 7.9 (L) 08/15/2022      Lab Results   Component Value Date    .0 06/29/2023    .0 03/17/2023    .0 08/15/2022        Diabetes:    HGBA1C:    Lab Results   Component Value Date    A1C 5.1 06/29/2023    A1C 5.3 06/01/2021    A1C 4.6 06/20/2018     06/29/2023       Lipid Panel:  Lab Results   Component Value Date    CHOLEST 164 06/29/2023    TRIG 188 (H) 06/29/2023    HDL 65 (H) 06/29/2023    LDL 68 06/29/2023    VLDL 28 06/29/2023    NONHDLC 99 06/29/2023       Vitamins/Minerals:  Lab Results   Component Value Date    B12 267 06/29/2023     Lab Results   Component Value Date    VITD 27.1 (L) 06/29/2023     No results found for: \"THIAMINE\"   Lab Results   Component Value Date/Time    VITB1 159.3 06/29/2023 09:44 AM     Lab Results   Component Value Date/Time    FOLIC 41.7 01/06/0876 62:35 AM       Meds:    Current Outpatient Medications:     prochlorperazine 5 MG Oral, Take 1 tablet (5 mg total) by mouth every 6 (six) hours as needed for Nausea., Disp: 60 tablet, Rfl: 1    clonazePAM (KLONOPIN) 0.5 MG Oral Tab, Take 1 to 2 tablets (0.5mg to 1mg) by mouth twice daily, Disp: 120 tablet, Rfl: 2    QUEtiapine 25 MG Oral Tab, Take 1-2 tablets (25-50 mg total) by mouth nightly., Disp: 30 tablet, Rfl: 2    FLUoxetine HCl 40 MG Oral Cap, Take 1 capsule (40 mg total) by mouth daily. , Disp: 90 capsule, Rfl: 1    methylPREDNISolone (MEDROL) 4 MG Oral Tablet Therapy Pack, Dosepack: take as directed (Patient not taking: Reported on 11/2/2023), Disp: 1 each, Rfl: 0    HYDROcodone-acetaminophen 5-325 MG Oral Tab, Take 1-2 tablets by mouth every 4 (four) hours as needed for Pain., Disp: 15 tablet, Rfl: 0    cyanocobalamin 1000 MCG/ML Injection Solution, Inject 1 mL (1,000 mcg total) into the muscle every 30 (thirty) days. , Disp: 1 each, Rfl: 6    drospirenone-ethinyl estradiol (OCELLA) 3-0.03 MG Oral Tab, Take 1 tablet by mouth daily. , Disp: 28 tablet, Rfl: 2    ubrogepant (UBRELVY) 100 MG Oral Tab, Take 100 mg by mout may repeat once 2 hours later in same day max 2 per 24 hours. , Disp: 10 tablet, Rfl: 2    Phentermine HCl 37.5 MG Oral Tab, Take 1 tablet (37.5 mg total) by mouth every morning before breakfast., Disp: 30 tablet, Rfl: 2    topiramate 25 MG Oral Tab, Take 2 tablets (50 mg total) by mouth 2 (two) times daily. , Disp: 120 tablet, Rfl: 2    apixaban (ELIQUIS) 2.5 MG Oral Tab, Take 1 tablet (2.5 mg total) by mouth 2 (two) times daily. , Disp: 30 tablet, Rfl: 0    prochlorperazine (COMPAZINE) 10 mg tablet, Take 1 tablet (10 mg total) by mouth every 6 (six) hours as needed for Nausea., Disp: 30 tablet, Rfl: 3    Hydroquinone 4 % External Cream, Apply topically 2 (two) times daily as needed. , Disp: , Rfl:     albuterol (PROAIR HFA) 108 (90 Base) MCG/ACT Inhalation Aero Soln, Inhale 2 puffs into the lungs every 6 (six) hours as needed for Wheezing., Disp: 3 each, Rfl: 0    albuterol (2.5 MG/3ML) 0.083% Inhalation Nebu Soln, Take 3 mL (2.5 mg total) by nebulization every 4 (four) hours as needed for Wheezing., Disp: 15 each, Rfl: 2    BIOTIN OR, Take 1 tablet by mouth 2 (two) times a day., Disp: , Rfl:     Fluticasone Furoate-Vilanterol 200-25 MCG/INH Inhalation Aerosol Powder, Breath Activated, Inhale 1 puff into the lungs daily. , Disp: 1 each, Rfl: 0    cetirizine 10 MG Oral Tab, Take 1 tablet (10 mg total) by mouth in the morning, at noon, and at bedtime. , Disp: , Rfl:       Diet recall:          Breakfast         Snack         Lunch          Snack        Dinner         Snack   Premier shake     skip [employer] bean soup and banana or hamburger or sushi and mixed veg skip [place] and apple and [employer] beef sandwich canteloupe or honeydew         Total Kcal: 820-1500 calories/day  Protein intake: 51-83 grams/day  Fluid intake:  64+ ounces/day    Excessive in: other: carbs on some days  Inadequate in: nothing  Patient has made some modifications and adjustments to diet: yes, is eating 2 servings of veg and 1 serving of fruit per day, is eating separately from drinking is waiting 30 minutes most times after eating to drink, is chewing food well before swallowing, Meal pattern consists of 3 meals, 1 snacks and 1 protein supplements. Vitamin/mineral supplements:  Bariatric Choice and Vit D  Protein supplements:  Other [employer] Lean protein shake and Premier Protein  Activity Level: active  Type: other: boxing and Slim Fu 2 says per week 1-2.5 hours and weights and bands 2 days per week  Duration: 150 minutes  Frequency: per week    Other:  Met with pt for LPD instruction. Per pt is continuing to keep a food record. Per pt is consuming 820-1500 calories, 51-83 gm protein and  gm carbs per day. Provided pt with reminder to add fruit or vegetable at breakfast for added fiber. Reminded pt of green smoothie recipe or other recipes including protein with produce to continue meeting protein needs while keeping carbs low. Pt verbalized understanding. Per pt is eating 2 servings of veg and 1 serving of fruit per day, is eating separately from drinking is waiting 30 minutes most times after eating to drink, is chewing food well before swallowing. Per pt is drinking adequate amounts of fluids. Per pt just moved but is managing to get in exercise 2 days per week with goal to increase to 3 days per week. Per pt is continuing to do strength training. Reviewed LPD instructions. Answered pt questions. PT is ready for surgery from a dietary perspective. Pt with 1 month post op visit scheduled.      Nutrition Diagnosis: Morbid obesity: Improvement shown    Intervention     Interventions: Counseled for liquid protein diet, Gave hospital orientation, Reviewed quizzes, Materials given LPD checklist, snapshot of diet progression, dietary guidelines following surgery, and Gastic bypass surgery scheduled for 12/18/23  Reviewed ERAS requirements. Provided CHG bath and carb loading handouts. Provided/reviewed handout on pain control after bariatric surgery. Recommendations/goals:   Keep it going goals:   1. Keep a food record, My Net Diary.-Continue  2. Strive to consume at least 4-6 meals/snacks per day. Include protein and produce when you eat. Aim for 75-90 grams of protein per day. Try to keep the carbohydrates at 120 grams per day or less. (Try spaghetti and try recipes for getting in protein with produce)  3. Continue to practice eating strategies, eat separately from drinking, avoid straws, chew food 20-30 times before swallowing. Make the meals last 30 minutes. -Continue  4. Continue to drink 64 oz per day of water. (Try  Protein water, adding True Lemon, Crystal Light). 5. Exercise and strength train with a goal of 30 minutes per day for exercise (for example,walking). Strength training 10 minutes 3 days per week. -Continue  6. Continue to bring healthy snack high protein ideas for lunch  7. Try sampling 20g protein shakes ([facility] lean shake)  Work in progress goals:   1. Line up your protein supplements for liquid protein. 2.  Buy 1 bottle of CHG soap. 3.  Buy the liquid or dissolvable Tylenol. Questions? [email or web address]      Monitor/Evaluate     Anthropometric measurements, Food/fluid intake/choices, Food intolerances, Activity level, Vitamin/mineral supplementation, Reinforce goals, and Calorie/protein intake  Patient understands protein requirements? yes  Patient understand fluid requirements (amount and method of intake)? yes  Patient understands post-operative diet? yes  Patient completed nutrition quizzes? yes  Patient signed commitment agreement? yes  Patient with RD clearance for surgery?  yes    Vane Ram RD, ANAN  Face-to-face time spent with pt: 60 minutes

## 2023-11-09 ENCOUNTER — OFFICE VISIT (OUTPATIENT)
Dept: INTERNAL MEDICINE CLINIC | Facility: CLINIC | Age: 38
End: 2023-11-09
Payer: COMMERCIAL

## 2023-11-09 VITALS — HEIGHT: 67 IN | BODY MASS INDEX: 39.58 KG/M2 | WEIGHT: 252.19 LBS

## 2023-11-09 DIAGNOSIS — K21.9 GERD WITHOUT ESOPHAGITIS: ICD-10-CM

## 2023-11-09 DIAGNOSIS — R79.89 LOW VITAMIN D LEVEL: ICD-10-CM

## 2023-11-09 DIAGNOSIS — R79.89 LOW VITAMIN B12 LEVEL: ICD-10-CM

## 2023-11-09 DIAGNOSIS — E66.9 OBESITY (BMI 30-39.9): Primary | ICD-10-CM

## 2023-11-09 PROCEDURE — 3008F BODY MASS INDEX DOCD: CPT

## 2023-11-09 PROCEDURE — 97803 MED NUTRITION INDIV SUBSEQ: CPT

## 2023-11-09 NOTE — PATIENT INSTRUCTIONS
Recommendations/goals:   Keep it going goals:   1. Keep a food record, My Net Diary.-Continue  2. Strive to consume at least 4-6 meals/snacks per day. Include protein and produce when you eat. Aim for 75-90 grams of protein per day. Try to keep the carbohydrates at 120 grams per day or less. (Try spaghetti and try recipes for getting in protein with produce)  3. Continue to practice eating strategies, eat separately from drinking, avoid straws, chew food 20-30 times before swallowing. Make the meals last 30 minutes. -Continue  4. Continue to drink 64 oz per day of water. (Try  Protein water, adding True Lemon, Crystal Light). 5. Exercise and strength train with a goal of 30 minutes per day for exercise (for example,walking). Strength training 10 minutes 3 days per week. -Continue  6. Continue to bring healthy snack high protein ideas for lunch  7. Try sampling 20g protein shakes ([facility] lean shake)  Work in progress goals:   1. Line up your protein supplements for liquid protein. 2.  Buy 1 bottle of CHG soap. 3.  Buy the liquid or dissolvable Tylenol. Questions? [email or web address]

## 2023-12-01 ENCOUNTER — TELEPHONE (OUTPATIENT)
Dept: SURGERY | Facility: CLINIC | Age: 38
End: 2023-12-01

## 2023-12-04 ENCOUNTER — TELEPHONE (OUTPATIENT)
Dept: SURGERY | Facility: CLINIC | Age: 38
End: 2023-12-04

## 2023-12-04 ENCOUNTER — HOSPITAL ENCOUNTER (OUTPATIENT)
Dept: MRI IMAGING | Facility: HOSPITAL | Age: 38
Discharge: HOME | End: 2023-12-04
Attending: INTERNAL MEDICINE
Payer: COMMERCIAL

## 2023-12-04 DIAGNOSIS — D50.9 IRON DEFICIENCY ANEMIA, UNSPECIFIED IRON DEFICIENCY ANEMIA TYPE: ICD-10-CM

## 2023-12-04 DIAGNOSIS — R11.12 PROJECTILE VOMITING WITH NAUSEA: ICD-10-CM

## 2023-12-07 ENCOUNTER — HOSPITAL ENCOUNTER (OUTPATIENT)
Dept: NUCLEAR MEDICINE | Facility: HOSPITAL | Age: 38
Discharge: HOME | End: 2023-12-07
Attending: INTERNAL MEDICINE
Payer: COMMERCIAL

## 2023-12-07 DIAGNOSIS — R11.12 PROJECTILE VOMITING WITH NAUSEA: ICD-10-CM

## 2023-12-07 PROCEDURE — 78264 GASTRIC EMPTYING IMG STUDY: CPT | Performed by: INTERNAL MEDICINE

## 2023-12-11 NOTE — PROGRESS NOTES
Ameena Méndez / Robbie Hanley / America Zeng / Shola Mccullough / Vicky Varghese / Debbie Santizo - [phone]  Answering Service [phone]        PCP: Jonna Mora: Magalis        6/13/23      260.0     40.7  8/2/23        253.9     39.8  11/2/23      247.0     38.7  12/14/23     253.0     39.6         History of Present Illness:  Geoff Leon is a 34year [de-identified] old female presenting for evaluation for bariatric surgery. RYGB - scheduled 12/18 6/13/23  Has struggled with her weight her entire life. Has seen Dr. Melanie Payton - gets phentermine from PCP now. Has tried other meds - topiramate, metformin, DEP, only taking topiramate  On phentermine on and off for the past 5 years or so. Felt like it helped at the begininng - lost about 60# along with other drugs, but she can't remember which ones. Went off it for awhile and gained weight back. Has thought about WLS surgery for some time. Was discouraged by Dr. Melanie Payton     Has had GERD for 20 years or so. Does note some chronic RUQ abdominal pain as well. Describes as a chest pressure, happens daily, but not taking PPI daily now, but when she does take it it only helps minimally. Some globus sensation with soda. Diet: Carb heavy  Breakfast: bagels to bowl of cereal or breakfast sandwich - usually FF  Lunch: Skips if she eats breakfast  Dinner: pasta, sometimes chicken salad. 50/50 - eating out / takeout vs. Cooking at home        8/2/23 - Logging on a regular basis. Exercise: goes to gym 3x/week -working with  -  susan / [place] [employer]     Pt continues to have occasional nausea, intermittent vomiting - takes compazine 1-2x/week. Saw GI years ago, last EGD was years ago. No obvious cause has been found, unsure what kind of work-up has been shown. No obvious triggers with food actually, finds that nausea and intermittent epigastric discomfort more often happens on an empty stomach.   Continues to have occasional heartburn symptoms. Only takes PPI occasionally but has symptoms she says almost daily. Next visit:   Continue logging, focus on processed carbs. GI eval, upper GI for nausea/vomiting     Dietitian  Cards  Pulm  Psych  GI  [facility] GI        11/2/23 - logging on a regular basis. Meeting protein goals, some fruits/vegetables - discussed increasing. Carbs in range most days, some days a bit high. Taking PPI before dinner. Nausea/emesis still present, less often. She still throwing up/dry heaving, about once/week. No obvious pattern to her symptoms or triggering factors. Has seen GI and got endoscopy so far objective evidence is only concerning for mild reflux without significant esophagitis or Hannon's. Has follow-up with the GI nurse practitioner at the end of the month. Had recent back issue and had to be on a pulsed course of steroids, she is off that and that was done at the beginning of October. Exercise:  Walks 1-2 miles / day with dogs. boxing/ginny fu 3x/week, added weights 2x/week. 12/14/23 - nausea better since been off phentermine. No vomiting over the past month since being off phentermine and has been on and off phentermine for years, but symptoms started before then. Got a bit off track with surgery postponed bc of continued GI workup. Still focused on protein, but mainly with holidays coming up, has felt like she slacked off a little bit. Ready to get back on track. On a regular workout plan. Exercise: Walks 1-2 miles / day with dogs. boxing/ginny fu 2x/week, joined [employer] - cardio/swimming. Back pain stable and improved. Dietitian: Efren Bee  6/29/23 - Sees Dr. Lilian Fontaine for weight management. Taking phentermine for weightloss. Patient has struggled with weight her whole life. Has tried many diet. Most successful with medications and , however regained the weight 12 months later.  Patient's triggers are carb heavy foods. Per diet recall, patient eats usually 3 meals per day. Patient has been working on eating a lower carb diet since starting this process. Has eliminated caffeine and carbonation. Has been drinking 64oz of water per day. Provided healthy snack list to help lower carb intake and increase protein intake. Reviewed daily protein and carb goals. Discussed logging with MND. Patient tries to walk 1-1.5 miles per day with her dog and goes to the gym on [address]. Discussed increasing activity as tolerated. Reviewed program binder and answered patient's questions. 8/11/323 Patient has started keeping a food record with MND, however not consistent. Reviewed log, and patient is meeting protein needs many days and typically keeps her carbs below 120g/day. Patient does skip lunch sometimes. Reports the phentermine makes her not have an appetite. Discussed the need to eat a protein rich snack at least instead of skipping. Reviewed the healthy snack list handout. Patient has no issues getting in water intake. Has had 1 diet soda since LOV. Has switched to decaf. Patient has been staying away from pasta and less fried foods. Patient has been practicing eating strategies. Started taking vit D and is going to start bariatric choice chewable 1 per day. Patient is working with a  3x/week. Congratulated patient on the great changes. Patient would like to increase fruit intake as well. Discussed ideas and provided recipes that are protein/produce focused. Did [employer] with patient and reviewed. 9/12/23 Patient is keeping a consistent food record with MND. Patient does tend to skip meals d/t forgetting or busy at work. Patient is not hitting protein needs most days. Reviewed options to bring for lunch to increase protein intake and avoid skipping. Patient is keeping carb intake <120g/day.  Patient has been very carb conscious and will avoid pasta, breads, rice, etc. Patient has increased fruit and veggie intake. Patient has eliminated caffeine and carbonation. Very rarely has coke zero when she gets a migraine. Discussed other non-carbonated options. Patient has no issues getting in water intake. Patient reported she needs more practice with eating strategies, however is more conscious of them. Patient is still exercising 3x/week and is trying to walk more at work and taking the stairs. Discussed with patient the need to hit protein intake to be cleared. Patient understands. Discussed shakes to prepare for LPD. 10/25/23 - Met with pt for pre-op visit. Per pt is taking phentermine and feels that it is helping to control hunger. Per pt is keeping a food record and is consuming 1400 calories,  gm protein and  gm carbs per day. Pt provided with ideas and recipes to continue to include protein with produce while keeping carbs appropriately low. Pt verbalized understanding. Per pt has practiced eating separately from drinking is waiting 20-25 minutes after eating to drink, has practiced chewing well before swallowing,  is eating slowly, eliminated caffeine- used to drink Coke Zero and Death wish coffee- now decaf coffee and no soda, is eating 4 servings of vegetables per day and 2-3 servings of fruit per day. Per pt is drinking 64+ oz of water per day. Pt is active with boxedda and Drea Johnson three days per week at 1-2.5 hours per time and doing 15-20 minutes of weights twice per week. Reviewed quizzes in binder. Pt scored A+ on all quizzes after review. Pt is ready for LPD instruction which is scheduled for December. 11/9/23 - Met with pt for LPD instruction. Per pt is continuing to keep a food record. Per pt is consuming 820-1500 calories, 51-83 gm protein and  gm carbs per day. Provided pt with reminder to add fruit or vegetable at breakfast for added fiber.   Reminded pt of green smoothie recipe or other recipes including protein with produce to continue meeting protein needs while keeping carbs low. Pt verbalized understanding. Per pt is eating 2 servings of veg and 1 serving of fruit per day, is eating separately from drinking is waiting 30 minutes most times after eating to drink, is chewing food well before swallowing. Per pt is drinking adequate amounts of fluids. Per pt just moved but is managing to get in exercise 2 days per week with goal to increase to 3 days per week. Per pt is continuing to do strength training. Reviewed LPD instructions. Answered pt questions. PT is ready for surgery from a dietary perspective. Pt with 1 month post op visit scheduled. Bar: Magalis  6/14/23 - preop appt scheduled, needs bloodwork, start phentermine     9/12/23 - GERD , stress, B12 shots for low B12        Psych: 7/12/23 - approved, but wants to check-in               -needs followup               10/15/23 - ok for surgery     Cards: 9/5/23 - Genevieve Cade - Compensated cardiac status. Patient has no active angina or heart failure symptoms. She is low, acceptable risk to proceed with bariatric surgery with no additional cardiac testing needed. She may consider after her birthday in October a Ultrafast CT of calcium score for restratification considering that her father had heart disease in his 35s. She will probably come off of phentermine after surgery which I think is a good idea here. Follow-up with me in a year. Valorie Rodriguez - 9/5/23 - ok to hold eliquis prior to surgery, restart when appropriate. Stable incidental pulm nodel, ok for surgery. GI: Mandy Grayson   8/23/23 - Schedule EGD/Colonoscopy, omeprazole 40 1/2hr before to dinner               TTG Iga, Fecal fat, pancreatic elastase    EGD - mild esophageal erytehma, stomach,   C-scope - snare polypectomy transverse colon.   Repeat in 2 years     Upper GI 9/9/23 - spontaneous reflux, no HH     12/7/23 - NM GE scan - normal    Preop Labs  6/29/23 - Vit D 27, B12 267, all else good        Past Medical History: Migraines  Severe back issues - spinal cord stimulator   Asthma  GERD  Depression / Anxiety  PE - 2021 - Dr. 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 [employer]      Vitals:    12/14/23 0901   BP: 126/80   Pulse: 86     Wt Readings from Last 6 Encounters:   12/14/23 253 lb (114.8 kg)   12/07/23 247 lb (112 kg)   11/09/23 252 lb 3.2 oz (114.4 kg)   11/02/23 247 lb (112 kg)   10/25/23 252 lb 14.4 oz (114.7 kg)   10/18/23 245 lb (111.1 kg)       General: Alert & Oriented x3, NAD  HEENT: anicteric, EOMI, oropharynx clear  Neck: no palpable masses, trachea midlien  Chest; clear BS b/l  CV: RR  Abd: well healed incisions, obese, nondistended, soft, maybe some mild epigastric tenderness        42yo female here for preop evaluation for bariatric surgery. RYGB tentatively scheduled 12/18, but need to postpone because of continued GI workup. She has some baseline testing with her IgG, pancreatic elastase and fecal fat which she says she will get shortly. She has both the MRI and the CT enterography scheduled and we will get that. Was able to discuss this with Dr. Jasmyne Blas -she will review the workup but expects that if the CT or MR enterography are unremarkable, we should be able to move forward with surgery. I did discuss that there will still be some concerns as the reason for her ongoing symptoms is still a little unclear. That being said, the hope is that this is just a atypical presentation for gastroesophageal reflux disease and that by providing the relief that the gastric bypass should provide, that her symptoms should improve, in addition to the improvement in her diet that will hopefully come with bariatric surgery.     She is obviously a bit frustrated with having this pushed back but is very understanding of the situation and understanding about the concerns we have. We will tentatively push the surgery to January 29    She will follow-up with me in early January and I think it will be helpful for her also to follow-up with the dietitian prior to surgery as well so we will try to set that up on the same day for convenience.     Greater than 45 minutes spent evaluating and counseling patient, along with reviewing consultant notes and correspondence and blood work and coordinating with Dr. Cristhian Underwood

## 2023-12-14 ENCOUNTER — OFFICE VISIT (OUTPATIENT)
Dept: SURGERY | Facility: CLINIC | Age: 38
End: 2023-12-14
Payer: COMMERCIAL

## 2023-12-14 VITALS
OXYGEN SATURATION: 97 % | WEIGHT: 253 LBS | HEIGHT: 67 IN | DIASTOLIC BLOOD PRESSURE: 80 MMHG | BODY MASS INDEX: 39.71 KG/M2 | HEART RATE: 86 BPM | SYSTOLIC BLOOD PRESSURE: 126 MMHG

## 2023-12-15 ENCOUNTER — HOSPITAL ENCOUNTER (OUTPATIENT)
Dept: MRI IMAGING | Facility: HOSPITAL | Age: 38
Discharge: HOME | End: 2023-12-15
Attending: INTERNAL MEDICINE
Payer: COMMERCIAL

## 2023-12-15 PROCEDURE — 74183 MRI ABD W/O CNTR FLWD CNTR: CPT | Performed by: INTERNAL MEDICINE

## 2023-12-15 PROCEDURE — 72197 MRI PELVIS W/O & W/DYE: CPT | Performed by: INTERNAL MEDICINE

## 2023-12-15 PROCEDURE — A9575 INJ GADOTERATE MEGLUMI 0.1ML: HCPCS | Performed by: INTERNAL MEDICINE

## 2023-12-15 RX ORDER — GADOTERATE MEGLUMINE 376.9 MG/ML
20 INJECTION INTRAVENOUS
Status: COMPLETED | OUTPATIENT
Start: 2023-12-15 | End: 2023-12-15

## 2023-12-15 RX ADMIN — GADOTERATE MEGLUMINE 20 ML: 376.9 INJECTION INTRAVENOUS at 12:38:00

## 2024-01-08 NOTE — PROGRESS NOTES
[facility] / [facility]  Sim Álvarez / Adalid / Lesa / Macy / Felix  Office - [phone]  Answering Service [phone]          PCP: Eb Jiang: Magalis        Highest Preop weight      260.0     40.7  12/14/23 - 253.0      39.6  1/29/24 - RYGB scheduled            History of Present Illness:  Lashon London is a 38 year old-year old female presenting for evaluation for bariatric surgery.       RYGB - scheduled 1/29/24 1/11/24 - Doing well overall.  Weight on scale today a bit up but bundled bc of the weather - she says weight at home has been in the mid 250's and stable.      No further GI issues, stable.   MR enterography was unremarkable, patient and myself were both corresponded with Dr. Brennan and are okay to move forward with surgery.  She also got her CT enterography yesterday which to my eyes looks unremarkable, but official read is pending              EGD - mild esophageal erytehma, stomach,   C-scope - snare polypectomy transverse colon.  Repeat in 2 years     Upper GI 9/9/23 - spontaneous reflux, no HH    MRI - subcm cystic foci in pancreatic tail - rec MRCP in 12 months.      12/7/23 - NM GE scan - normal     Preop Labs  6/29/23 - Vit D 27, B12 267, all else good        Past Medical History:    Migraines  Severe back issues - spinal cord stimulator   Asthma  GERD  Depression / Anxiety  PE - 2021 - Dr. 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    Vitals:    01/11/24 0754   BP: 116/80   Pulse: 96     Wt Readings from Last 6 Encounters:   01/11/24 265 lb (120.2 kg)   01/11/24 265 lb (120.2 kg)   12/14/23 253 lb (114.8 kg)   12/07/23 247 lb (112 kg)   11/09/23 252 lb 3.2 oz  (114.4 kg)   11/02/23 247 lb (112 kg)       General: Alert & Oriented x3, NAD  HEENT: anicteric, EOMI, oropharynx clear  Neck: no palpable masses, trachea midlien  Chest; clear BS b/l  CV: RR  Abd: well healed incisions, obese, nondistended, soft, nontender        39yo female here for preop evaluation for bariatric surgery.     She has done well in preparation for bariatric surgery.  She is logging consistently, meeting protein goals.  She traveled through the holidays and felt like she did well on staying on track.     The patient has been counseled about the different area surgery operations and has chosen the gastric bypass as their procedure of choice.   I discussed the nature of the laparoscopic sarah en y gastric bypass.  We have discussed some concerns about the gastric bypass and her chronic GI symptoms, but extensive workup has been unrevealing in terms of the causes of it.  Some of it certainly may be diet related.  I am also wondering whether or not some of this may be due to pancreatic insufficiency.  She is still awaiting fecal fat testing, but even with that is negative, we may trial her with some pancreatic replacement postoperatively.  She is also scheduled to follow-up with Dr. Santamaria as an outpatient as well.    I discussed the laparoscopic approach, the possibility of conversion to open.  Patient understands the nature of the operation, including the malabsorptive nature of the RYGB, along with the potential for dumping syndrome postoperatively.  I discussed the possibility of encountering a hiatal hernia at the time of surgery and the indications for repair if one is found.  I discussed the anticipated hospital stay and recovery time, and convalescence off of work.      The risks of surgery have been discussed multiple times, and again were reviewed this visit, including the cardiosystemic risks of surgery, such as DVT, PE, MI, arrhythmias, renal failure, and a 0.1% risk of death.  Patient  understands the specific risks of the gastric bypass such as bleeding, infection, anastomotic leak, splenic injury.  Postop symptoms and warning signs, along with reasons to call or come in were reviewed and stressed.       I reviewed our postoperative pain pathway and the importance of mindfulness of using narcotics.  I discussed prescriptions that she will be going home with and alternate methods of pain control as well.    We also discussed potential longer term issues including, but not limited to anastomotic stricture or pouch dilatation, marginal ulcers, and bowel obstructions.  Long term followup was stressed and the importance of vitamin supplementation and checking blood work to prevent nutritional deficiencies was also stressed.      The patient agrees to proceed.    Aprepitant ordered        Procedure:   LAGB - 0  Lap RYGB - 3  Lap Sleeve - 4  Open RYGB - 6    Age <30 - 1  30-39 - 2  40-49 - 3  50-59 - 4  60+ - 5    BMI - <40 - 1  40-49 - 2  50-59 - 3  60+ - 4    Male - 2  Smoking history - 2  OR time > 3 hours - 2  Prior history of VTE - 5    Total Score  8    0-14 --> Low risk --> standard prophylaxis  15-19 --> Medium risk --> protocol for postop chemoprophylaxis  20 or greater --> high risk --> consider therapeutic chemoprophylaxis.

## 2024-01-10 ENCOUNTER — LAB ENCOUNTER (OUTPATIENT)
Dept: LAB | Facility: HOSPITAL | Age: 39
End: 2024-01-10
Attending: INTERNAL MEDICINE
Payer: COMMERCIAL

## 2024-01-10 ENCOUNTER — TELEPHONE (OUTPATIENT)
Dept: SURGERY | Facility: CLINIC | Age: 39
End: 2024-01-10

## 2024-01-10 ENCOUNTER — HOSPITAL ENCOUNTER (OUTPATIENT)
Dept: CT IMAGING | Facility: HOSPITAL | Age: 39
Discharge: HOME | End: 2024-01-10
Attending: INTERNAL MEDICINE
Payer: COMMERCIAL

## 2024-01-10 DIAGNOSIS — R14.0 BLOATING: ICD-10-CM

## 2024-01-10 DIAGNOSIS — D50.9 IRON DEFICIENCY ANEMIA, UNSPECIFIED IRON DEFICIENCY ANEMIA TYPE: ICD-10-CM

## 2024-01-10 DIAGNOSIS — R11.10 VOMITING IN ADULT: ICD-10-CM

## 2024-01-10 LAB — IGA SERPL-MCNC: 351.3 MG/DL (ref 40–350)

## 2024-01-10 PROCEDURE — 36415 COLL VENOUS BLD VENIPUNCTURE: CPT

## 2024-01-10 PROCEDURE — 74177 CT ABD & PELVIS W/CONTRAST: CPT | Performed by: INTERNAL MEDICINE

## 2024-01-10 PROCEDURE — 86364 TISS TRNSGLTMNASE EA IG CLAS: CPT

## 2024-01-10 PROCEDURE — 82784 ASSAY IGA/IGD/IGG/IGM EACH: CPT

## 2024-01-11 ENCOUNTER — TELEPHONE (OUTPATIENT)
Dept: SURGERY | Facility: CLINIC | Age: 39
End: 2024-01-11

## 2024-01-11 ENCOUNTER — OFFICE VISIT (OUTPATIENT)
Dept: SURGERY | Facility: CLINIC | Age: 39
End: 2024-01-11
Payer: COMMERCIAL

## 2024-01-11 VITALS
WEIGHT: 265 LBS | WEIGHT: 265 LBS | HEIGHT: 67 IN | HEIGHT: 67 IN | OXYGEN SATURATION: 99 % | DIASTOLIC BLOOD PRESSURE: 80 MMHG | SYSTOLIC BLOOD PRESSURE: 116 MMHG | BODY MASS INDEX: 41.59 KG/M2 | HEART RATE: 96 BPM | BODY MASS INDEX: 41.59 KG/M2

## 2024-01-11 DIAGNOSIS — E66.01 MORBID OBESITY WITH BMI OF 40.0-44.9, ADULT (HCC): Primary | ICD-10-CM

## 2024-01-11 LAB — TTG IGA SER-ACNC: 0.8 U/ML (ref ?–7)

## 2024-01-11 PROCEDURE — 97803 MED NUTRITION INDIV SUBSEQ: CPT | Performed by: DIETITIAN, REGISTERED

## 2024-01-11 PROCEDURE — 3008F BODY MASS INDEX DOCD: CPT | Performed by: DIETITIAN, REGISTERED

## 2024-01-11 RX ORDER — APREPITANT 40 MG/1
40 CAPSULE ORAL DAILY
Qty: 1 CAPSULE | Refills: 0 | Status: SHIPPED | OUTPATIENT
Start: 2024-01-11

## 2024-01-11 NOTE — PROGRESS NOTES
[facility] BAR[facility]ATRIC AND [facility]IGHT LOSS CLINIC  [address]  [address]  [facility] [address]  Dept: [phone]  Loc: [phone]    01/11/24      Bariatric Follow-up Nutrition Session    Lashon London is a 38 year old female.     Assessment     Procedure:  Gastric Bypass  Here for medical weight management: no  Revision:  NA  Surgery Date:  1/29/24  Medical Diagnosis:  morbidly obese    Labs:  Lab Results   Component Value Date    GLU 97 06/29/2023    BUN 12 06/29/2023    BUNCREA 15.0 06/29/2023    CREATSERUM 0.80 06/29/2023    ANIONGAP 6 06/29/2023    GFR 96 05/01/2017    GFRNAA 85 03/25/2022    GFRAA 98 03/25/2022    CA 9.2 06/29/2023    OSMOCALC 288 06/29/2023    ALKPHO 79 06/29/2023    AST 9 (L) 06/29/2023    ALT 18 06/29/2023    BILT 0.4 06/29/2023    TP 7.6 06/29/2023    ALB 3.7 06/29/2023    GLOBULIN 3.9 06/29/2023    AGRATIO 1.6 01/07/2016     06/29/2023    K 4.5 06/29/2023     06/29/2023    CO2 21.0 06/29/2023     Lab Results   Component Value Date    MG 2.0 03/25/2022      Phosphorus (mg/dL)   Date Value   03/26/2022 1.6 (L)   03/25/2022 1.1 (L)   03/25/2022 1.2 (L)       Thyroid:    Lab Results   Component Value Date    TSH 1.420 06/29/2023    TSH 0.554 03/24/2022    TSH 0.455 06/01/2021    T4F 0.9 06/29/2023    T4F 1.0 06/01/2021    T4F 0.8 12/22/2020       Iron Panel:  Lab Results   Component Value Date    IRON 62 06/29/2023    TRANSFERRIN 285 06/29/2023    TIBCP 425 06/29/2023    SAT 15 06/29/2023       CBC:  Lab Results   Component Value Date    WBC 6.3 06/29/2023    WBC 4.6 03/17/2023    WBC 6.4 08/15/2022     Lab Results   Component Value Date    HEMOGLOBIN 10.7 (L) 01/07/2016    HGB 12.4 06/29/2023    HGB 11.3 (L) 03/17/2023    HGB 7.9 (L) 08/15/2022      Lab Results   Component Value Date    .0 06/29/2023    .0 03/17/2023    .0 08/15/2022       Diabetes:    Lab Results   Component Value Date     06/29/2023    A1C 5.1  06/29/2023       Lipid Panel:  Lab Results   Component Value Date    CHOLEST 164 06/29/2023    TRIG 188 (H) 06/29/2023    HDL 65 (H) 06/29/2023    LDL 68 06/29/2023    VLDL 28 06/29/2023    NONHDLC 99 06/29/2023        Vitamins/Minerals:  Lab Results   Component Value Date    B12 267 06/29/2023     Lab Results   Component Value Date    VITD 27.1 (L) 06/29/2023     No results found for: \"THIAMINE\"   Lab Results   Component Value Date/Time    VITB1 159.3 06/29/2023 09:44 AM     Lab Results   Component Value Date/Time    FOLIC 18.7 06/29/2023 09:44 AM        Meds:     Current Outpatient Medications:     clonazePAM (KLONOPIN) 0.5 MG Oral Tab, Take 1 to 2 tablets (0.5mg to 1mg) by mouth twice daily, Disp: 120 tablet, Rfl: 2    FLUoxetine HCl 40 MG Oral Cap, Take 1 capsule (40 mg total) by mouth daily., Disp: 90 capsule, Rfl: 1    QUEtiapine 25 MG Oral Tab, Take 1-2 tablets (25-50 mg total) by mouth nightly., Disp: 30 tablet, Rfl: 2    drospirenone-ethinyl estradiol (OCELLA) 3-0.03 MG Oral Tab, Take 1 tablet by mouth daily., Disp: 28 tablet, Rfl: 2    ubrogepant (UBRELVY) 100 MG Oral Tab, Take 100 mg by mout may repeat once 2 hours later in same day max 2 per 24 hours., Disp: 10 tablet, Rfl: 2    topiramate 25 MG Oral Tab, Take 2 tablets (50 mg total) by mouth 2 (two) times daily., Disp: 120 tablet, Rfl: 2    apixaban (ELIQUIS) 2.5 MG Oral Tab, Take 1 tablet (2.5 mg total) by mouth 2 (two) times daily., Disp: 30 tablet, Rfl: 0    prochlorperazine (COMPAZINE) 10 mg tablet, Take 1 tablet (10 mg total) by mouth every 6 (six) hours as needed for Nausea., Disp: 30 tablet, Rfl: 3    Hydroquinone 4 % External Cream, Apply topically 2 (two) times daily as needed., Disp: , Rfl:     albuterol (PROAIR HFA) 108 (90 Base) MCG/ACT Inhalation Aero Soln, Inhale 2 puffs into the lungs every 6 (six) hours as needed for Wheezing., Disp: 3 each, Rfl: 0    albuterol (2.5 MG/3ML) 0.083% Inhalation Nebu Soln, Take 3 mL (2.5 mg total) by  nebulization every 4 (four) hours as needed for Wheezing., Disp: 15 each, Rfl: 2    Fluticasone Furoate-Vilanterol 200-25 MCG/INH Inhalation Aerosol Powder, Breath Activated, Inhale 1 puff into the lungs daily., Disp: 1 each, Rfl: 0    cetirizine 10 MG Oral Tab, Take 1 tablet (10 mg total) by mouth in the morning, at noon, and at bedtime., Disp: , Rfl:     Height:    Ht Readings from Last 1 Encounters:   12/14/23 5' 7\" (1.702 m)     Weight today:  265 lbs  Weight:    Wt Readings from Last 6 Encounters:   12/14/23 253 lb (114.8 kg)   12/07/23 247 lb (112 kg)   11/09/23 252 lb 3.2 oz (114.4 kg)   11/02/23 247 lb (112 kg)   10/25/23 252 lb 14.4 oz (114.7 kg)   10/18/23 245 lb (111.1 kg)       Weight change:  + 12 lbs since LOV  BMI: 41.5    Protein Intake: 33 - 157 grams/day; 87 g protein Tuesday  Fluid intake:  64 ozs water ounces/day    Diet history:       Breakfast      AM Snack       Lunch     PM Snack     Dinner   Tur[place] sharpe 3 slices, OJ 8 ozs, 2 hard boiled eggs  Garden salad 2 c, no dressing, grilled chicken 3 ozs, 8 medium strawberries Sugar free popsicle 1x Tomato soup 2c, 1 c green beans           Total Calories:  1044 kcals, 70 g carbs, 26 g fat  Excessive in: other: none  Inadequate in:  nothing ; caloric intake seems to be within acceptable range.  Pt reports previous weight was all from reported weight not from  actual weight taken at the clinic.    Patient has made some modifications and adjustments to diet: yes, pt is doing more salads, 2 servings of veggies and a serving of fruit/day; no dressing, no caffeine, no carbonation, more water; watching portions; small bites, drinking water 30 mins before and 30 mins after meals; purchased protein shakes [facility] Lean, Ensure pre-surgery clear drink and CHG wash.    Food intolerances:  no  Vitamin/mineral supplements:  Bariatric Choice 1x/day; will do 4x/day after surgery  Protein supplements:  [employer] Lean ; Premier     Activity Level: moderate  Type: walk and  other: free weights boxing, hai chi  Duration: 45-60 mins everyday; boxing and hai chi 30 -45 minutes 2x/wk  Frequency: per day, per week    Other:  Other than a seemingly 12 lbs weight increase in one month, reported behaviors are aligned with the bariatric lifestyle.      Nutrition Diagnosis     Nutrition Diagnosis: Morbid obesity: Unresolved     Intervention     Recommendations/goals:    Continue practicing diet and behavior goals as reported above.    Monitor/Evaluate     Anthropometric measurements, Food/fluid intake/choices, Food intolerances, Activity level, Vitamin/mineral supplementation, Reinforce goals, Calorie/protein intake, and Other:  RTC 3-4 weeks post-op.  Additional RD visits required to review concepts? No  Patient understands protein requirements? Yes  Patient understand fluid requirements (amount and method of intake)? Yes  Patient understands post-operative diet? Yes  Patient keeping consistent food records? Yes  Patient ready for Liquid Protein Education? Completed before      Susannah Harmon MS, RD, LDN  Face-to-face time spent with pt: 40 minutes

## 2024-01-18 ENCOUNTER — LAB ENCOUNTER (OUTPATIENT)
Dept: LAB | Facility: HOSPITAL | Age: 39
End: 2024-01-18
Attending: SURGERY
Payer: COMMERCIAL

## 2024-01-18 DIAGNOSIS — E66.01 MORBID OBESITY WITH BMI OF 40.0-44.9, ADULT (HCC): ICD-10-CM

## 2024-01-18 LAB
ALBUMIN SERPL-MCNC: 4.5 G/DL (ref 3.2–4.8)
ALBUMIN/GLOB SERPL: 1.5 {RATIO} (ref 1–2)
ALP LIVER SERPL-CCNC: 81 U/L
ALT SERPL-CCNC: 39 U/L
ANION GAP SERPL CALC-SCNC: 5 MMOL/L (ref 0–18)
AST SERPL-CCNC: 37 U/L (ref ?–34)
BILIRUB SERPL-MCNC: 0.3 MG/DL (ref 0.3–1.2)
BUN BLD-MCNC: 15 MG/DL (ref 9–23)
BUN/CREAT SERPL: 22.7 (ref 10–20)
CALCIUM BLD-MCNC: 9.6 MG/DL (ref 8.7–10.4)
CHLORIDE SERPL-SCNC: 107 MMOL/L (ref 98–112)
CO2 SERPL-SCNC: 28 MMOL/L (ref 21–32)
CREAT BLD-MCNC: 0.66 MG/DL
DEPRECATED RDW RBC AUTO: 41.8 FL (ref 35.1–46.3)
EGFRCR SERPLBLD CKD-EPI 2021: 114 ML/MIN/1.73M2 (ref 60–?)
ERYTHROCYTE [DISTWIDTH] IN BLOOD BY AUTOMATED COUNT: 12.9 % (ref 11–15)
EST. AVERAGE GLUCOSE BLD GHB EST-MCNC: 105 MG/DL (ref 68–126)
FASTING STATUS PATIENT QL REPORTED: NO
GLOBULIN PLAS-MCNC: 3 G/DL (ref 2.8–4.4)
GLUCOSE BLD-MCNC: 105 MG/DL (ref 70–99)
HBA1C MFR BLD: 5.3 % (ref ?–5.7)
HCT VFR BLD AUTO: 36.8 %
HGB BLD-MCNC: 11.8 G/DL
MCH RBC QN AUTO: 28.8 PG (ref 26–34)
MCHC RBC AUTO-ENTMCNC: 32.1 G/DL (ref 31–37)
MCV RBC AUTO: 89.8 FL
OSMOLALITY SERPL CALC.SUM OF ELEC: 291 MOSM/KG (ref 275–295)
PLATELET # BLD AUTO: 257 10(3)UL (ref 150–450)
POTASSIUM SERPL-SCNC: 4 MMOL/L (ref 3.5–5.1)
PROT SERPL-MCNC: 7.5 G/DL (ref 5.7–8.2)
RBC # BLD AUTO: 4.1 X10(6)UL
SODIUM SERPL-SCNC: 140 MMOL/L (ref 136–145)
WBC # BLD AUTO: 5.5 X10(3) UL (ref 4–11)

## 2024-01-18 PROCEDURE — 83036 HEMOGLOBIN GLYCOSYLATED A1C: CPT

## 2024-01-18 PROCEDURE — 80053 COMPREHEN METABOLIC PANEL: CPT

## 2024-01-18 PROCEDURE — 36415 COLL VENOUS BLD VENIPUNCTURE: CPT

## 2024-01-18 PROCEDURE — 85027 COMPLETE CBC AUTOMATED: CPT

## 2024-01-22 ENCOUNTER — HOSPITAL ENCOUNTER (OUTPATIENT)
Dept: MRI IMAGING | Facility: HOSPITAL | Age: 39
Discharge: HOME | End: 2024-01-22
Attending: FAMILY MEDICINE
Payer: COMMERCIAL

## 2024-01-22 PROCEDURE — 72141 MRI NECK SPINE W/O DYE: CPT | Performed by: FAMILY MEDICINE

## 2024-01-29 ENCOUNTER — ANESTHESIA (OUTPATIENT)
Dept: SURGERY | Facility: HOSPITAL | Age: 39
End: 2024-01-29
Payer: COMMERCIAL

## 2024-01-29 ENCOUNTER — HOSPITAL ENCOUNTER (INPATIENT)
Facility: HOSPITAL | Age: 39
LOS: 2 days | Discharge: HOME | End: 2024-01-31
Attending: SURGERY | Admitting: SURGERY
Payer: COMMERCIAL

## 2024-01-29 ENCOUNTER — ANESTHESIA EVENT (OUTPATIENT)
Dept: SURGERY | Facility: HOSPITAL | Age: 39
End: 2024-01-29
Payer: COMMERCIAL

## 2024-01-29 DIAGNOSIS — E66.01 MORBID (SEVERE) OBESITY DUE TO EXCESS CALORIES (HCC): Primary | ICD-10-CM

## 2024-01-29 PROBLEM — J45.909 ASTHMA (HCC): Status: ACTIVE | Noted: 2021-03-09

## 2024-01-29 PROBLEM — J45.909 ASTHMA: Status: ACTIVE | Noted: 2021-03-09

## 2024-01-29 LAB
ANTIBODY SCREEN: NEGATIVE
B-HCG UR QL: NEGATIVE
RH BLOOD TYPE: POSITIVE

## 2024-01-29 PROCEDURE — 0DJ08ZZ INSPECTION OF UPPER INTESTINAL TRACT, VIA NATURAL OR ARTIFICIAL OPENING ENDOSCOPIC: ICD-10-PCS | Performed by: SURGERY

## 2024-01-29 PROCEDURE — 99222 1ST HOSP IP/OBS MODERATE 55: CPT | Performed by: HOSPITALIST

## 2024-01-29 PROCEDURE — 3E0T3BZ INTRODUCTION OF ANESTHETIC AGENT INTO PERIPHERAL NERVES AND PLEXI, PERCUTANEOUS APPROACH: ICD-10-PCS | Performed by: SURGERY

## 2024-01-29 PROCEDURE — 0D164ZA BYPASS STOMACH TO JEJUNUM, PERCUTANEOUS ENDOSCOPIC APPROACH: ICD-10-PCS | Performed by: SURGERY

## 2024-01-29 RX ORDER — FAMOTIDINE 20 MG/1
20 TABLET, FILM COATED ORAL ONCE
Status: COMPLETED | OUTPATIENT
Start: 2024-01-29 | End: 2024-01-29

## 2024-01-29 RX ORDER — CEFAZOLIN SODIUM/WATER 2 G/20 ML
2 SYRINGE (ML) INTRAVENOUS ONCE
Status: COMPLETED | OUTPATIENT
Start: 2024-01-29 | End: 2024-01-29

## 2024-01-29 RX ORDER — HYDROMORPHONE HYDROCHLORIDE 1 MG/ML
0.4 INJECTION, SOLUTION INTRAMUSCULAR; INTRAVENOUS; SUBCUTANEOUS EVERY 5 MIN PRN
Status: DISCONTINUED | OUTPATIENT
Start: 2024-01-29 | End: 2024-01-29 | Stop reason: HOSPADM

## 2024-01-29 RX ORDER — CLONAZEPAM 0.5 MG/1
0.5 TABLET ORAL 2 TIMES DAILY PRN
Status: DISCONTINUED | OUTPATIENT
Start: 2024-01-29 | End: 2024-01-31

## 2024-01-29 RX ORDER — KETAMINE HYDROCHLORIDE 50 MG/ML
INJECTION, SOLUTION INTRAMUSCULAR; INTRAVENOUS AS NEEDED
Status: DISCONTINUED | OUTPATIENT
Start: 2024-01-29 | End: 2024-01-29 | Stop reason: SURG

## 2024-01-29 RX ORDER — METOCLOPRAMIDE HYDROCHLORIDE 5 MG/ML
10 INJECTION INTRAMUSCULAR; INTRAVENOUS ONCE
Status: DISCONTINUED | OUTPATIENT
Start: 2024-01-29 | End: 2024-01-29 | Stop reason: HOSPADM

## 2024-01-29 RX ORDER — MIDAZOLAM HYDROCHLORIDE 1 MG/ML
INJECTION INTRAMUSCULAR; INTRAVENOUS AS NEEDED
Status: DISCONTINUED | OUTPATIENT
Start: 2024-01-29 | End: 2024-01-29 | Stop reason: SURG

## 2024-01-29 RX ORDER — METOCLOPRAMIDE 10 MG/1
10 TABLET ORAL ONCE
Status: DISCONTINUED | OUTPATIENT
Start: 2024-01-29 | End: 2024-01-29 | Stop reason: HOSPADM

## 2024-01-29 RX ORDER — HEPARIN SODIUM 5000 [USP'U]/ML
5000 INJECTION, SOLUTION INTRAVENOUS; SUBCUTANEOUS ONCE
Status: COMPLETED | OUTPATIENT
Start: 2024-01-29 | End: 2024-01-29

## 2024-01-29 RX ORDER — HYDROMORPHONE HYDROCHLORIDE 1 MG/ML
0.2 INJECTION, SOLUTION INTRAMUSCULAR; INTRAVENOUS; SUBCUTANEOUS EVERY 5 MIN PRN
Status: DISCONTINUED | OUTPATIENT
Start: 2024-01-29 | End: 2024-01-29 | Stop reason: HOSPADM

## 2024-01-29 RX ORDER — ONDANSETRON 2 MG/ML
INJECTION INTRAMUSCULAR; INTRAVENOUS AS NEEDED
Status: DISCONTINUED | OUTPATIENT
Start: 2024-01-29 | End: 2024-01-29 | Stop reason: SURG

## 2024-01-29 RX ORDER — OXYCODONE HYDROCHLORIDE 5 MG/1
5 TABLET ORAL EVERY 4 HOURS PRN
Qty: 10 TABLET | Refills: 0 | Status: SHIPPED | OUTPATIENT
Start: 2024-01-29

## 2024-01-29 RX ORDER — FAMOTIDINE 10 MG/ML
20 INJECTION, SOLUTION INTRAVENOUS ONCE
Status: COMPLETED | OUTPATIENT
Start: 2024-01-29 | End: 2024-01-29

## 2024-01-29 RX ORDER — HYDROMORPHONE HYDROCHLORIDE 1 MG/ML
0.6 INJECTION, SOLUTION INTRAMUSCULAR; INTRAVENOUS; SUBCUTANEOUS EVERY 5 MIN PRN
Status: DISCONTINUED | OUTPATIENT
Start: 2024-01-29 | End: 2024-01-29 | Stop reason: HOSPADM

## 2024-01-29 RX ORDER — ONDANSETRON 2 MG/ML
4 INJECTION INTRAMUSCULAR; INTRAVENOUS EVERY 6 HOURS
Status: DISCONTINUED | OUTPATIENT
Start: 2024-01-29 | End: 2024-01-31

## 2024-01-29 RX ORDER — ACETAMINOPHEN 500 MG
1000 TABLET ORAL ONCE
Status: DISCONTINUED | OUTPATIENT
Start: 2024-01-29 | End: 2024-01-29 | Stop reason: HOSPADM

## 2024-01-29 RX ORDER — ACETAMINOPHEN 160 MG/5ML
1000 SOLUTION ORAL EVERY 8 HOURS
Status: DISCONTINUED | OUTPATIENT
Start: 2024-01-29 | End: 2024-01-31

## 2024-01-29 RX ORDER — MORPHINE SULFATE 10 MG/ML
6 INJECTION, SOLUTION INTRAMUSCULAR; INTRAVENOUS EVERY 10 MIN PRN
Status: DISCONTINUED | OUTPATIENT
Start: 2024-01-29 | End: 2024-01-29 | Stop reason: HOSPADM

## 2024-01-29 RX ORDER — CELECOXIB 200 MG/1
400 CAPSULE ORAL ONCE
Status: COMPLETED | OUTPATIENT
Start: 2024-01-29 | End: 2024-01-29

## 2024-01-29 RX ORDER — PANTOPRAZOLE SODIUM 40 MG/1
40 TABLET, DELAYED RELEASE ORAL DAILY
Qty: 30 TABLET | Refills: 2 | Status: SHIPPED | OUTPATIENT
Start: 2024-01-29

## 2024-01-29 RX ORDER — ALBUTEROL SULFATE 2.5 MG/3ML
2.5 SOLUTION RESPIRATORY (INHALATION) EVERY 4 HOURS PRN
Status: DISCONTINUED | OUTPATIENT
Start: 2024-01-29 | End: 2024-01-31

## 2024-01-29 RX ORDER — NALOXONE HYDROCHLORIDE 0.4 MG/ML
0.08 INJECTION, SOLUTION INTRAMUSCULAR; INTRAVENOUS; SUBCUTANEOUS AS NEEDED
Status: DISCONTINUED | OUTPATIENT
Start: 2024-01-29 | End: 2024-01-29 | Stop reason: HOSPADM

## 2024-01-29 RX ORDER — TOPIRAMATE 25 MG/1
50 TABLET ORAL 2 TIMES DAILY
Status: DISCONTINUED | OUTPATIENT
Start: 2024-01-29 | End: 2024-01-31

## 2024-01-29 RX ORDER — PANTOPRAZOLE SODIUM 40 MG/1
40 TABLET, DELAYED RELEASE ORAL
Status: DISCONTINUED | OUTPATIENT
Start: 2024-01-30 | End: 2024-01-31

## 2024-01-29 RX ORDER — QUETIAPINE FUMARATE 25 MG/1
25 TABLET, FILM COATED ORAL NIGHTLY
Status: DISCONTINUED | OUTPATIENT
Start: 2024-01-29 | End: 2024-01-31

## 2024-01-29 RX ORDER — SODIUM CHLORIDE, SODIUM LACTATE, POTASSIUM CHLORIDE, CALCIUM CHLORIDE 600; 310; 30; 20 MG/100ML; MG/100ML; MG/100ML; MG/100ML
INJECTION, SOLUTION INTRAVENOUS CONTINUOUS
Status: DISCONTINUED | OUTPATIENT
Start: 2024-01-29 | End: 2024-01-29 | Stop reason: HOSPADM

## 2024-01-29 RX ORDER — NALOXONE HYDROCHLORIDE 4 MG/.1ML
4 SPRAY NASAL AS NEEDED
Qty: 1 KIT | Refills: 0 | Status: SHIPPED | OUTPATIENT
Start: 2024-01-29

## 2024-01-29 RX ORDER — ACETAMINOPHEN 500 MG
1000 TABLET ORAL ONCE
Status: COMPLETED | OUTPATIENT
Start: 2024-01-29 | End: 2024-01-29

## 2024-01-29 RX ORDER — MORPHINE SULFATE 4 MG/ML
2 INJECTION, SOLUTION INTRAMUSCULAR; INTRAVENOUS EVERY 10 MIN PRN
Status: DISCONTINUED | OUTPATIENT
Start: 2024-01-29 | End: 2024-01-29 | Stop reason: HOSPADM

## 2024-01-29 RX ORDER — DIPHENHYDRAMINE HYDROCHLORIDE 50 MG/ML
INJECTION INTRAMUSCULAR; INTRAVENOUS AS NEEDED
Status: DISCONTINUED | OUTPATIENT
Start: 2024-01-29 | End: 2024-01-29 | Stop reason: SURG

## 2024-01-29 RX ORDER — FLUOXETINE HYDROCHLORIDE 20 MG/1
40 CAPSULE ORAL DAILY
Status: DISCONTINUED | OUTPATIENT
Start: 2024-01-30 | End: 2024-01-31

## 2024-01-29 RX ORDER — DEXAMETHASONE SODIUM PHOSPHATE 4 MG/ML
VIAL (ML) INJECTION AS NEEDED
Status: DISCONTINUED | OUTPATIENT
Start: 2024-01-29 | End: 2024-01-29 | Stop reason: SURG

## 2024-01-29 RX ORDER — MORPHINE SULFATE 4 MG/ML
4 INJECTION, SOLUTION INTRAMUSCULAR; INTRAVENOUS EVERY 10 MIN PRN
Status: DISCONTINUED | OUTPATIENT
Start: 2024-01-29 | End: 2024-01-29 | Stop reason: HOSPADM

## 2024-01-29 RX ORDER — OXYCODONE HYDROCHLORIDE 5 MG/1
5 TABLET ORAL EVERY 6 HOURS PRN
Status: DISCONTINUED | OUTPATIENT
Start: 2024-01-29 | End: 2024-01-31

## 2024-01-29 RX ORDER — KETOROLAC TROMETHAMINE 15 MG/ML
15 INJECTION, SOLUTION INTRAMUSCULAR; INTRAVENOUS EVERY 6 HOURS
Status: COMPLETED | OUTPATIENT
Start: 2024-01-29 | End: 2024-01-31

## 2024-01-29 RX ORDER — ENOXAPARIN SODIUM 100 MG/ML
40 INJECTION SUBCUTANEOUS DAILY
Status: DISCONTINUED | OUTPATIENT
Start: 2024-01-30 | End: 2024-01-31

## 2024-01-29 RX ORDER — LIDOCAINE HYDROCHLORIDE 10 MG/ML
INJECTION, SOLUTION EPIDURAL; INFILTRATION; INTRACAUDAL; PERINEURAL AS NEEDED
Status: DISCONTINUED | OUTPATIENT
Start: 2024-01-29 | End: 2024-01-29 | Stop reason: SURG

## 2024-01-29 RX ORDER — GLYCOPYRROLATE 0.2 MG/ML
INJECTION, SOLUTION INTRAMUSCULAR; INTRAVENOUS AS NEEDED
Status: DISCONTINUED | OUTPATIENT
Start: 2024-01-29 | End: 2024-01-29 | Stop reason: SURG

## 2024-01-29 RX ORDER — SODIUM CHLORIDE, SODIUM LACTATE, POTASSIUM CHLORIDE, CALCIUM CHLORIDE 600; 310; 30; 20 MG/100ML; MG/100ML; MG/100ML; MG/100ML
INJECTION, SOLUTION INTRAVENOUS CONTINUOUS
Status: DISCONTINUED | OUTPATIENT
Start: 2024-01-29 | End: 2024-01-31

## 2024-01-29 RX ORDER — ACETAMINOPHEN 160 MG/5ML
650 LIQUID ORAL EVERY 6 HOURS PRN
Qty: 500 ML | Refills: 1 | Status: SHIPPED | OUTPATIENT
Start: 2024-01-29

## 2024-01-29 RX ORDER — GABAPENTIN 300 MG/1
300 CAPSULE ORAL ONCE
Status: COMPLETED | OUTPATIENT
Start: 2024-01-29 | End: 2024-01-29

## 2024-01-29 RX ORDER — ROCURONIUM BROMIDE 10 MG/ML
INJECTION, SOLUTION INTRAVENOUS AS NEEDED
Status: DISCONTINUED | OUTPATIENT
Start: 2024-01-29 | End: 2024-01-29 | Stop reason: SURG

## 2024-01-29 RX ORDER — BUPIVACAINE HYDROCHLORIDE AND EPINEPHRINE 2.5; 5 MG/ML; UG/ML
INJECTION, SOLUTION INFILTRATION; PERINEURAL AS NEEDED
Status: DISCONTINUED | OUTPATIENT
Start: 2024-01-29 | End: 2024-01-29 | Stop reason: HOSPADM

## 2024-01-29 RX ADMIN — MIDAZOLAM HYDROCHLORIDE 2 MG: 1 INJECTION INTRAMUSCULAR; INTRAVENOUS at 07:40:00

## 2024-01-29 RX ADMIN — KETAMINE HYDROCHLORIDE 5 MG: 50 INJECTION, SOLUTION INTRAMUSCULAR; INTRAVENOUS at 08:07:00

## 2024-01-29 RX ADMIN — ROCURONIUM BROMIDE 5 MG: 10 INJECTION, SOLUTION INTRAVENOUS at 07:47:00

## 2024-01-29 RX ADMIN — DEXAMETHASONE SODIUM PHOSPHATE 8 MG: 4 MG/ML VIAL (ML) INJECTION at 07:55:00

## 2024-01-29 RX ADMIN — CEFAZOLIN SODIUM/WATER 2 G: 2 G/20 ML SYRINGE (ML) INTRAVENOUS at 07:40:00

## 2024-01-29 RX ADMIN — SODIUM CHLORIDE, SODIUM LACTATE, POTASSIUM CHLORIDE, CALCIUM CHLORIDE: 600; 310; 30; 20 INJECTION, SOLUTION INTRAVENOUS at 08:35:00

## 2024-01-29 RX ADMIN — ONDANSETRON 4 MG: 2 INJECTION INTRAMUSCULAR; INTRAVENOUS at 09:22:00

## 2024-01-29 RX ADMIN — KETAMINE HYDROCHLORIDE 20 MG: 50 INJECTION, SOLUTION INTRAMUSCULAR; INTRAVENOUS at 07:59:00

## 2024-01-29 RX ADMIN — DIPHENHYDRAMINE HYDROCHLORIDE 12.5 MG: 50 INJECTION INTRAMUSCULAR; INTRAVENOUS at 07:40:00

## 2024-01-29 RX ADMIN — SODIUM CHLORIDE, SODIUM LACTATE, POTASSIUM CHLORIDE, CALCIUM CHLORIDE: 600; 310; 30; 20 INJECTION, SOLUTION INTRAVENOUS at 09:20:00

## 2024-01-29 RX ADMIN — ROCURONIUM BROMIDE 5 MG: 10 INJECTION, SOLUTION INTRAVENOUS at 09:00:00

## 2024-01-29 RX ADMIN — LIDOCAINE HYDROCHLORIDE 50 MG: 10 INJECTION, SOLUTION EPIDURAL; INFILTRATION; INTRACAUDAL; PERINEURAL at 07:47:00

## 2024-01-29 RX ADMIN — GLYCOPYRROLATE 0.1 MG: 0.2 INJECTION, SOLUTION INTRAMUSCULAR; INTRAVENOUS at 07:40:00

## 2024-01-29 RX ADMIN — SODIUM CHLORIDE, SODIUM LACTATE, POTASSIUM CHLORIDE, CALCIUM CHLORIDE: 600; 310; 30; 20 INJECTION, SOLUTION INTRAVENOUS at 07:40:00

## 2024-01-29 RX ADMIN — ROCURONIUM BROMIDE 35 MG: 10 INJECTION, SOLUTION INTRAVENOUS at 07:55:00

## 2024-01-29 RX ADMIN — GLYCOPYRROLATE 0.1 MG: 0.2 INJECTION, SOLUTION INTRAMUSCULAR; INTRAVENOUS at 07:47:00

## 2024-01-29 NOTE — ANESTHESIA POSTPROCEDURE EVALUATION
Patient: Lashon London    Procedure Summary       Date: 01/29/24 Room / Location: [facility] MAIN OR 01 / [facility] MAIN OR    Anesthesia Start: 0740 Anesthesia Stop:     Procedure: Laparoscopic,sarah-en-y gastric bypass (Abdomen) Diagnosis: (Obesity, BMI, back pain, asthma, iron deficiency anemia)    Surgeons: Ethan Mcfadden MD Anesthesiologist: Anuel Maguire MD    Anesthesia Type: general ASA Status: 3            Anesthesia Type: general    Vitals Value Taken Time   /92 01/29/24 0950   Temp 97.4 °F (36.3 °C) 01/29/24 0950   Pulse 84 01/29/24 0950   Resp 14 01/29/24 0950   SpO2 94 % 01/29/24 0950       [facility] AN Post Evaluation:   Patient Evaluated in PACU  Patient Participation: complete - patient participated  Level of Consciousness: awake  Pain Score: 0  Pain Management: adequate  Airway Patency:patent  Dental exam unchanged from preop  Yes    Cardiovascular Status: acceptable  Respiratory Status: face mask and acceptable  Postoperative Hydration acceptable  Comments: #90 airway in place  Report to recovery FRANCESCA TRAYLOR CRNA  1/29/2024 9:50 AM

## 2024-01-29 NOTE — PROGRESS NOTES
Bariatric Inpatient Nutrition Note      Lashon London is a 38 year old female.    Procedure: Laparoscopic gastric bypass surgery  Surgery Date: 1/29/24  Medical Diagnosis: Morbid obesity    Height:    Ht Readings from Last 1 Encounters:   01/23/24 5' 7\" (1.702 m)       Weight:    Wt Readings from Last 6 Encounters:   01/29/24 260 lb (117.9 kg)   01/11/24 265 lb (120.2 kg)   01/11/24 265 lb (120.2 kg)   12/14/23 253 lb (114.8 kg)   12/07/23 247 lb (112 kg)   11/09/23 252 lb 3.2 oz (114.4 kg)       Weight change:  - 5 lbs since LOV   BMI: Body mass index is 40.72 kg/m².    Nutrition Diagnosis: Morbid obesity: Unresolved  Patient complains of: abdominal pain, left side, using ice pack, pain lvl 6; c/o nausea    Pt reports walked to bathroom and chair; has not burped nor passed gas.    Diet history    Current Diet Order: Bariatric Clear Liquid  Protein Intake:  none at this time  Fluid Intake:  ~ 1 ozs so far  + ice chips    Food Intolerances: Other: shellfish  Vitamins/Minerals: Other: Barimelts; instructed pt to review adequacy with RD      Labs    No results for input(s): \"RBC\", \"HGB\", \"HCT\", \"MCV\", \"MCH\", \"MCHC\", \"RDW\", \"NEPRELIM\", \"WBC\", \"PLT\" in the last 168 hours.    No results for input(s): \"GLU\", \"BUN\", \"CREATSERUM\", \"GFRAA\", \"GFRNAA\", \"CA\", \"ALB\", \"NA\", \"K\", \"CL\", \"CO2\", \"ALKPHO\", \"AST\", \"ALT\", \"BILT\", \"TP\" in the last 168 hours.    Lab Results   Component Value Date    MG 2.0 03/25/2022     Phosphorus (mg/dL)   Date Value   03/26/2022 1.6 (L)   03/25/2022 1.1 (L)   03/25/2022 1.2 (L)       HGBA1C:    Lab Results   Component Value Date    A1C 5.3 01/18/2024    A1C 5.1 06/29/2023    A1C 5.3 06/01/2021     01/18/2024       Vitamins/Minerals:  Lab Results   Component Value Date    B12 267 06/29/2023     Lab Results   Component Value Date    VITD 27.1 (L) 06/29/2023     No results found for: \"THIAMINE\"   Lab Results   Component Value Date/Time    VITB1 159.3 06/29/2023 09:44 AM     Lab  Results   Component Value Date/Time    FOLIC 18.7 06/29/2023 09:44 AM       Medications    IV Fluids: 150 ml/hr   lactated ringers infusion   Intravenous Continuous    [COMPLETED] famotidine (Pepcid) tab 20 mg  20 mg Oral Once    Or    [COMPLETED] famotidine (Pepcid) 20 mg/2mL injection 20 mg  20 mg Intravenous Once    [COMPLETED] heparin (Porcine) 5000 UNIT/ML injection 5,000 Units  5,000 Units Subcutaneous Once    [COMPLETED] gabapentin (Neurontin) cap 300 mg  300 mg Oral Once    [COMPLETED] celecoxib (CeleBREX) cap 400 mg  400 mg Oral Once    [COMPLETED] acetaminophen (Tylenol Extra Strength) tab 1,000 mg  1,000 mg Oral Once    [COMPLETED] ceFAZolin (Ancef) 2 g in 20mL IV syringe premix  2 g Intravenous Once    lactated ringers infusion   Intravenous Continuous    [START ON 1/30/2024] pantoprazole (Protonix) DR tab 40 mg  40 mg Oral QAM AC    acetaminophen (Tylenol) 160 MG/5ML oral liquid 1,000 mg  1,000 mg Oral Q8H    ketorolac (Toradol) 15 MG/ML injection 15 mg  15 mg Intravenous Q6H    oxyCODONE immediate release tab 5 mg  5 mg Oral Q6H PRN    ondansetron (Zofran) 4 MG/2ML injection 4 mg  4 mg Intravenous q6h    [START ON 1/30/2024] enoxaparin (Lovenox) 40 MG/0.4ML SUBQ injection 40 mg  40 mg Subcutaneous Daily    albuterol (Ventolin) (2.5 MG/3ML) 0.083% nebulizer solution 2.5 mg  2.5 mg Nebulization Q4H PRN    clonazePAM (KlonoPIN) tab 0.5 mg  0.5 mg Oral BID PRN    [START ON 1/30/2024] FLUoxetine (PROzac) cap 40 mg  40 mg Oral Daily    QUEtiapine (SEROquel) tab 25 mg  25 mg Oral Nightly    topiramate (TopaMAX) tab 50 mg  50 mg Oral BID          Recommendations/Goals: Reviewed bariatric diet stages, fluid intake goals and chart, Begin bariatric diet stage 1 upon discharge from hospital, Follow discharge instructions , Progress through bariatric diet stages as directed and as tolerated, Start bariatric vitamins this weekend as tolerated, Call the registered dietitian for questions, and Return to clinic  3-4  weeks post-op    Monitor/Evaluate: Anthropometric measurements, Food/fluid intake/choices, Food intolerances, Activity level, Vitamin/mineral supplementation, Reinforce goals, Calorie/protein intake, and Other:  RTC for 1st surgeon visit 7-10 days.    Susannah Harmon MS, Rd, LDN

## 2024-01-29 NOTE — H&P
Please use PN from 1/11/24 as H&P.     No changes other than slight adjustment to DVT risk - Prior history of DVT adds 5 points, but still in low risk category.   Tolerated liquid diet well.   Took aprepitant this AM.   To OR for lap RYGB.

## 2024-01-29 NOTE — PROGRESS NOTES
Provided/reviewed bariatric post-op discharge instructions; stressed importance of fluids aim for 64 ozs/day, caring for incisions, activities/allowed/avoid; meds to take/avoid; importance of bariatric vits; pt planning to take Bariatrimelts; managing constipation; post-op fu; pt has appt next week w/ surgeon; signs and symptoms of concern; contact info; pt agreed and verbalized understanding.

## 2024-01-29 NOTE — ANESTHESIA PROCEDURE NOTES
Airway  Date/Time: 1/29/2024 7:50 AM  Urgency: Elective      General Information and Staff    Patient location during procedure: OR  Anesthesiologist: Anuel Maguire MD  Resident/CRNA: Angelia Ward CRNA  Performed: CRNA   Performed by: Angelia Ward CRNA  Authorized by: Anuel Maguire MD      Indications and Patient Condition  Indications for airway management: anesthesia  Sedation level: deep  Preoxygenated: yes  Patient position: sniffing  Mask difficulty assessment: 1 - vent by mask    Final Airway Details  Final airway type: endotracheal airway      Successful airway: ETT  Cuffed: yes   Successful intubation technique: Video laryngoscopy  Endotracheal tube insertion site: oral  Blade: GlideScope  Blade size: #3  ETT size (mm): 7.5    Cormack-Lehane Classification: grade I - full view of glottis  Placement verified by: capnometry   Measured from: lips  ETT to lips (cm): 21  Number of attempts at approach: 1    Additional Comments  Dental exam unchanged from pre op

## 2024-01-29 NOTE — OPERATIVE REPORT
[facility] / [facility]  Sim Álvarez / Adalid / Lesa / Macy / Felix  Office - [phone]  Answering Service [phone]        Date: 1/29/24  Preop Diagnosis: Morbid Obesity secondary to excess calories   Postop Diagnosis: Morbid Obesity secondary to excess calories  Procedure: Laparoscopic Telma-en-y Gastric Bypass, EGD  Surgeon: Lesa  Assistant: Felix  Anesthesia: GETA  EBL: 10ml  Complications: None    Pt to PACU, extubated, stable    Indications: Pt is a 38 year old female who presents for elective bariatric surgery.  she has been counseled regarding the treatment of severe obesity, the options of behavioral, medical, and surgical treatment plans, and the potential pros and cons of each. she understands the multidisciplinary approach to the treatment of obesity, and the need for long-term followup.   Through a lengthy preoperative counseling process, she has elected for the Telma-en-Y gastric bypass as her procedure of choice.  she understands the different options for bariatric surgery, the laparoscopic approach, the possibility conversion to open, the anticipated pain and recovery time, the anticipated weight loss, the pros and cons of each operation, and the risks of bariatric surgery associated, including but not limited to universal risks such as bleeding, infection, DVT, PE, cardiac and pulmonary complications, and the small risk of suffering a fatal complication/death, as well as risks specific to the gastric bypass such as anastomotic leak, gastric or splenic injury.  she also understands the potential long term sequelae and complications such as marginal ulcer, stricture, gastrogastric fistula, internal hernia, small bowel obstruction.  she also understands the need for lifelong nutritional supplementation, and long-term follow-up, and agrees to proceed.    Operative Summary: The patient was brought to the operating room and placed under general anesthesia.   Perioperative antibiotics and subcutaneous heparin were given.  The patient was wrapped and draped in a sterile fashion.  An Optiview technique was used to insert an 11 mm left paramedian trocar and the abdomen was insufflated and the patient tolerated this well per the area of Optiview insertion was examined and there is no evidence of any injury.  Cursory examination of the abdomen was unremarkable.  A 12 mm left lateral trocar, a 12 mm right paramedian trocar, and a 5 mm right upper quadrant trocar were all inserted under direct vision.  A total of 60cc of 0.25% marcaine with epi was injected for local anesthesia during trocar placement around the incisions and as a bilateral laparoscopic assisted TAP block.      We started by elevating the omentum up towards the head, allowing us to elevate the transverse mesocolon.  The ligament of Treitz was identified.  It was measured out to 50cm and then divided with a FLAQUITA white load stapler.  The mesentery was further divided with ultrasonic energy.  Our Telma limb was then counted out to 150 cm.  A 2-0 silk stay suture was placed, enterotomies were made, and a side-to-side jejunojejunostomy was created with a FLAQUITA white load stapler.  The common enterotomy was then closed with a running 2-0 silk suture.  The mesenteric defect was closed with a running 2-0 silk suture.    I then divided the greater omentum up to the level of the transverse colon, creating a pocket to allow the telma limb to reach the upper abdomen without any tension.   We began our gastric pouch dissection by dissecting out the angle of Hiss using a mixture of blunt dissection and hook electrocautery.  We then turned to the lesser curve and began our retrogastric tunnel along the lesser curve distal to the GE junction.  We entered a nice retrogastric plane and then with blunt dissection carefully dissected all the way up to the angle of His where our previous dissection had thinned out the peritoneum  overlying this area.      At that point a gastrotomy was made along the anterolateral of the stomach close to the greater curve.  An 0-silk suture was placed on the anvil of the EEA stapler which was inserted via the left lateral trocar site.  An articulating maryland grasper was guided through this gastrotomy and an opening was made to guide the suture through the anterior wall of the stomach.  The silk suture was grasped and the anvil was then brought out through the gastrotomy.  We then completed our pouch with 5 fires of the [place] blue load stapler, fully dividing the pouch from the remnant stomach.   The lateral gastrotomy was closed with an [place] blue load fire.      We then identified our Telma limb which reached up to this area without any tension.  A 2-0 silk antimesenteric stay suture was placed.  Enterotomy was made.  An Levon wound protector was inserted through the left lateral trocar site.  The EEA stapler was then guided through her left lateral trocar site which was enlarged slightly to accommodate the stapler.  The stapler was easily advanced through the enterotomy into our Telma limb.  The spike was opened up in a spot along the antimesenteric border of the small intestine.  The spike and anvil were then mated, and the stapler was closed under direct vision.    Care was taken to ensure the telma limb and pouch lined up nicely without twisting and no extraneous tissue was int he closure.  It was fired and then removed.  A balloon trocar was used to maintenance maintain insufflation along her left lateral trocar site.  There were 2 full-thickness circumferential donuts in the stapler.  The open end of our Telma limb was then resected using a FLAQUITA white load stapler, with ultrasonic energy used to divide the mesentery.  It was extracted through the left lateral trocar site with the wound protector still in place.  A 2-0 vicryl antitension sutures was placed on the medial side of our anastomosis.      An EGD was then performed.  The endoscope was guided through the esophagus down into her small gastric pouch.  There was excellent hemostasis along in the pouch and along the staple line.  The anastomosis was then insufflated under irrigated saline, and there is no evidence of any leak after we occluded the Telma limb with an atraumatic grasper.  The endoscope was removed.  Lukas liver retractor was then removed under direct vision.  The left lateral trocar site fascia was closed with interrupted #1 PDS sutures passed with a Josse-Puneet suture passer.  There was excellent hemostasis.  The left lateral trocar site was thoroughly irrigated and suctioned out.  Trocar sites were closed with 4-0 Vicryl subcuticular sutures.  Patient tolerated the procedure well and was brought to the postanesthesia recovery unit in stable condition..

## 2024-01-29 NOTE — ANESTHESIA POSTPROCEDURE EVALUATION
Patient: Lashon London    Procedure Summary       Date: 01/29/24 Room / Location: [facility] MAIN OR 01 / [facility] MAIN OR    Anesthesia Start: 0740 Anesthesia Stop: 0951    Procedure: Laparoscopic,sarah-en-y gastric bypass (Abdomen) Diagnosis: (Obesity, BMI, back pain, asthma, iron deficiency anemia)    Surgeons: Ethan Mcfadden MD Anesthesiologist: Anuel Maguire MD    Anesthesia Type: general ASA Status: 3            Anesthesia Type: general    Vitals Value Taken Time   /69 01/29/24 1216   Temp 98.1 °F (36.7 °C) 01/29/24 1140   Pulse 76 01/29/24 1216   Resp 17 01/29/24 1205   SpO2 90 % 01/29/24 1216   Vitals shown include unfiled device data.    [facility] AN Post Evaluation    Anuel Maguire MD  1/29/2024 3:08 PM

## 2024-01-29 NOTE — CONSULTS
[facility]    PATIENT'S NAME: MITA MORILLO   ATTENDING PHYSICIAN: Ethan Mcfadden MD   CONSULTING PHYSICIAN: Tae Le MD   PATIENT ACCOUNT#:   [id number]    LOCATION:  [facility] PACU 8 [facility] 10  MEDICAL RECORD #:   [id number]       YOB: 1985  ADMISSION DATE:       01/29/2024      CONSULT DATE:  01/29/2024    REPORT OF CONSULTATION      REASON FOR ADMISSION:  Post laparoscopic Telma-en-Y gastric bypass procedure.    HISTORY OF PRESENT ILLNESS:  The patient is a 38-year-old  female with chronic morbid obesity, exhausted outpatient conservative medical management options, evaluated by Bariatric Surgery, Dr. Ethan Mcfadden, and scheduled today for above-mentioned procedure.  Postoperatively transferred to PACU for further monitoring.    PAST MEDICAL HISTORY:  Morbid obesity, asthma, depression, posttraumatic stress disorder, degenerative joint disease of lumbar spine, and chronic back pain.    PAST SURGICAL HISTORY:  Lumbar laminectomy, lumbar spinal cord stimulator, cholecystectomy, nasal septoplasty, and left shoulder rotator cuff repair.    MEDICATIONS:  Please see medication reconciliation list.    ALLERGIES:  She is allergic to shellfish, moxifloxacin, Wellbutrin, and bupropion.  She had side effects to Dilaudid, tramadol, and Haldol.    SOCIAL HISTORY:  Ex-tobacco user.  No current tobacco, alcohol, or drug use.  Lives with her family.  Independent for basic activities of daily living.    FAMILY HISTORY:  Father with coronary artery disease, hypertension, and cerebrovascular accident.    REVIEW OF SYSTEMS:  Currently resting in bed.  Abdominal bloating and discomfort.  Mild nausea.  No chest pain.  No shortness of breath.  Other 12-point review of systems negative.      PHYSICAL EXAMINATION:    GENERAL:  Alert and oriented to time, place, and person, mild distress.  VITAL SIGNS:  Temperature 97.4, pulse 83, respiratory rate 17, blood pressure 150/81, pulse ox 94% on  room air.  HEENT:  Atraumatic.  Oropharynx clear.  Dry mucous membranes.  Normal hard and soft palate.  Eyes:  Anicteric sclerae.    NECK:  Supple.  No lymphadenopathy.  Trachea midline.  Full range of motion.  LUNGS:  Clear to auscultation bilaterally.  Normal respiratory effort.   HEART:  Regular rate and rhythm.  S1 and S2 auscultated.  No murmur.  ABDOMEN:  Soft.  Laparoscopic incisions.  Hypoactive bowel sounds.  No distention.  No tenderness.  EXTREMITIES:  No peripheral edema, clubbing, or cyanosis.  NEUROLOGIC:  Motor and sensory intact.      ASSESSMENT AND PLAN:    1.   Morbid obesity status post laparoscopic Telma-en-Y gastric bypass surgery and EGD.  Pain control.  Clear liquid diet.  DVT prophylaxis.  Nausea control.  Monitor hemodynamic status.   2.   Asthma.  Continue home medications and monitor.  3.   Depression and posttraumatic stress disorder.  Continue home medications.    Dictated By Tae Le MD  d: 01/29/2024 10:35:00  t: 01/29/2024 12:11:11  Job [id number]  FB/
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
## 2024-01-30 LAB
ANION GAP SERPL CALC-SCNC: 6 MMOL/L (ref 0–18)
BASOPHILS # BLD AUTO: 0.02 X10(3) UL (ref 0–0.2)
BASOPHILS NFR BLD AUTO: 0.3 %
BUN BLD-MCNC: 10 MG/DL (ref 9–23)
BUN/CREAT SERPL: 12.3 (ref 10–20)
CALCIUM BLD-MCNC: 8.8 MG/DL (ref 8.7–10.4)
CHLORIDE SERPL-SCNC: 110 MMOL/L (ref 98–112)
CO2 SERPL-SCNC: 26 MMOL/L (ref 21–32)
CREAT BLD-MCNC: 0.81 MG/DL
DEPRECATED RDW RBC AUTO: 42 FL (ref 35.1–46.3)
EGFRCR SERPLBLD CKD-EPI 2021: 94 ML/MIN/1.73M2 (ref 60–?)
EOSINOPHIL # BLD AUTO: 0.02 X10(3) UL (ref 0–0.7)
EOSINOPHIL NFR BLD AUTO: 0.3 %
ERYTHROCYTE [DISTWIDTH] IN BLOOD BY AUTOMATED COUNT: 13 % (ref 11–15)
GLUCOSE BLD-MCNC: 111 MG/DL (ref 70–99)
HCT VFR BLD AUTO: 31.4 %
HGB BLD-MCNC: 10.4 G/DL
IMM GRANULOCYTES # BLD AUTO: 0.02 X10(3) UL (ref 0–1)
IMM GRANULOCYTES NFR BLD: 0.3 %
LYMPHOCYTES # BLD AUTO: 1.03 X10(3) UL (ref 1–4)
LYMPHOCYTES NFR BLD AUTO: 14.4 %
MCH RBC QN AUTO: 29.7 PG (ref 26–34)
MCHC RBC AUTO-ENTMCNC: 33.1 G/DL (ref 31–37)
MCV RBC AUTO: 89.7 FL
MONOCYTES # BLD AUTO: 0.71 X10(3) UL (ref 0.1–1)
MONOCYTES NFR BLD AUTO: 9.9 %
NEUTROPHILS # BLD AUTO: 5.34 X10 (3) UL (ref 1.5–7.7)
NEUTROPHILS # BLD AUTO: 5.34 X10(3) UL (ref 1.5–7.7)
NEUTROPHILS NFR BLD AUTO: 74.8 %
OSMOLALITY SERPL CALC.SUM OF ELEC: 294 MOSM/KG (ref 275–295)
PLATELET # BLD AUTO: 195 10(3)UL (ref 150–450)
POTASSIUM SERPL-SCNC: 4.4 MMOL/L (ref 3.5–5.1)
RBC # BLD AUTO: 3.5 X10(6)UL
SODIUM SERPL-SCNC: 142 MMOL/L (ref 136–145)
WBC # BLD AUTO: 7.1 X10(3) UL (ref 4–11)

## 2024-01-30 PROCEDURE — 99233 SBSQ HOSP IP/OBS HIGH 50: CPT | Performed by: HOSPITALIST

## 2024-01-30 NOTE — PLAN OF CARE
Received patient from PACU, oriented to room and call light. 5 abdominal lap sites dry and intact. Tolerating bariatric clear liquid diet, encouraged to increase intake. Intermittent nausea, relieved by zofran. Ambulating with supervision and voiding freely. Sat up in chair a few hours before returning to bed. Resting comfortably, call light within reach, frequent rounds in place and family at bedside.     Problem: Patient Centered Care  Goal: Patient preferences are identified and integrated in the patient's plan of care  Description: Interventions:  - What would you like us to know as we care for you?   - Provide timely, complete, and accurate information to patient/family  - Incorporate patient and family knowledge, values, beliefs, and cultural backgrounds into the planning and delivery of care  - Encourage patient/family to participate in care and decision-making at the level they choose  - Honor patient and family perspectives and choices  Outcome: Progressing           abdominal pain

## 2024-01-30 NOTE — PROGRESS NOTES
[facility] / [facility]  Sim Álvarez / Adalid / Lesa / Macy / Felix  Office - [phone]  Answering Service [phone]      Progress Note    Lashon London Patient Status:  Inpatient    10/15/1985 MRN [id number]   Location [facility] 4W/SW/SE Attending Ethan Mcfadden MD   Hosp Day # 1 PCP Meryl Parson,      Subjective:  Feels ok, walked last night, 8oz last night    Objective/Physical Exam:  Temp:  [97.4 °F (36.3 °C)-98.7 °F (37.1 °C)] 98.2 °F (36.8 °C)  Pulse:  [55-86] 55  Resp:  [14-19] 18  BP: ()/(60-99) 103/63  SpO2:  [90 %-97 %] 93 %    Intake/Output Summary (Last 24 hours) at 2024 0741  Last data filed at 2024 0558  Gross per 24 hour   Intake 2554 ml   Output 2810 ml   Net -256 ml       General:    Nad, in bed  Lungs:  unlabored  CV:  Regular, pulse 58  Abdomen:  Soft, wounds clean  Current Meds:  Current Facility-Administered Medications   Medication Dose Route Frequency    lactated ringers infusion   Intravenous Continuous    lactated ringers infusion   Intravenous Continuous    pantoprazole (Protonix) DR tab 40 mg  40 mg Oral QAM AC    acetaminophen (Tylenol) 160 MG/5ML oral liquid 1,000 mg  1,000 mg Oral Q8H    ketorolac (Toradol) 15 MG/ML injection 15 mg  15 mg Intravenous Q6H    oxyCODONE immediate release tab 5 mg  5 mg Oral Q6H PRN    ondansetron (Zofran) 4 MG/2ML injection 4 mg  4 mg Intravenous q6h    enoxaparin (Lovenox) 40 MG/0.4ML SUBQ injection 40 mg  40 mg Subcutaneous Daily    albuterol (Ventolin) (2.5 MG/3ML) 0.083% nebulizer solution 2.5 mg  2.5 mg Nebulization Q4H PRN    clonazePAM (KlonoPIN) tab 0.5 mg  0.5 mg Oral BID PRN    FLUoxetine (PROzac) cap 40 mg  40 mg Oral Daily    QUEtiapine (SEROquel) tab 25 mg  25 mg Oral Nightly    topiramate (TopaMAX) tab 50 mg  50 mg Oral BID       Labs:  Lab Results   Component Value Date    WBC 7.1 2024    HGB 10.4 (L) 2024    HCT 31.4 (L) 2024    .0  01/30/2024     01/30/2024    K 4.4 01/30/2024    CO2 26.0 01/30/2024    BUN 10 01/30/2024     (H) 01/30/2024       Imaging:  No results found.    Assessment/Plan:  S/p gastric bypass, in general doing well, needs to move more, drink more.  D/w pt and nurse, likely stay till tomorrow.  Re check labs in am    Basilio Cordoba MD  1/30/2024  7:41 AM

## 2024-01-30 NOTE — PLAN OF CARE
Post-op day #1. Lap sites to abdomen, ice as needed. Monitoring vital signs- stable at this time. Patient having incisional pain, mainly in the left side. Pain worsens when patient eats. Really encouraged oral intake and ambulation. Patient ambulated hallway a couple times and sat in chair for 2 meals. Receiving IV fluids per MD order. Voiding freely. Eddie hose and lovenox for DVT prophylaxis. Scheduled toradol and tylenol for pain. PRN oxy as well. Up with standby assist. In chair for some of the day.  Encouraged frequent ambulation and use of incentive spirometer. Fall precautions maintained- bed alarm on, bed locked in lowest position, call light and personal belongings within reach, non-skid socks in place to bilateral feet. Frequent rounding by nursing staff. Plan is home when cleared. Staying overnight per surgery.     Problem: Patient Centered Care  Goal: Patient preferences are identified and integrated in the patient's plan of care  Description: Interventions:  - What would you like us to know as we care for you?   - Provide timely, complete, and accurate information to patient/family  - Incorporate patient and family knowledge, values, beliefs, and cultural backgrounds into the planning and delivery of care  - Encourage patient/family to participate in care and decision-making at the level they choose  - Honor patient and family perspectives and choices  Outcome: Progressing     Problem: Patient/Family Goals  Goal: Patient/Family Long Term Goal  Description: Patient's Long Term Goal: to go home    Interventions:  - follow MD Orders  - update patient and family on plan of care  - discharge planning  - tolerate oral intake   - ambulate  - See additional Care Plan goals for specific interventions  Outcome: Progressing  Goal: Patient/Family Short Term Goal  Description: Patient's Short Term Goal: to have less pain    Interventions:   - pain medications as needed  - non-pharmacologic pain interventions  -  ambulate  - tolerate oral intake   - See additional Care Plan goals for specific interventions  Outcome: Progressing     Problem: PAIN - ADULT  Goal: Verbalizes/displays adequate comfort level or patient's stated pain goal  Description: INTERVENTIONS:  - Encourage pt to monitor pain and request assistance  - Assess pain using appropriate pain scale  - Administer analgesics based on type and severity of pain and evaluate response  - Implement non-pharmacological measures as appropriate and evaluate response  - Consider cultural and social influences on pain and pain management  - Manage/alleviate anxiety  - Utilize distraction and/or relaxation techniques  - Monitor for opioid side effects  - Notify MD/LIP if interventions unsuccessful or patient reports new pain  - Anticipate increased pain with activity and pre-medicate as appropriate  Outcome: Progressing     Problem: DISCHARGE PLANNING  Goal: Discharge to home or other facility with appropriate resources  Description: INTERVENTIONS:  - Identify barriers to discharge w/pt and caregiver  - Include patient/family/discharge partner in discharge planning  - Arrange for needed discharge resources and transportation as appropriate  - Identify discharge learning needs (meds, wound care, etc)  - Arrange for interpreters to assist at discharge as needed  - Consider post-discharge preferences of patient/family/discharge partner  - Complete POLST form as appropriate  - Assess patient's ability to be responsible for managing their own health  - Refer to Case Management Department for coordinating discharge planning if the patient needs post-hospital services based on physician/LIP order or complex needs related to functional status, cognitive ability or social support system  Outcome: Progressing     Problem: GASTROINTESTINAL - ADULT  Goal: Minimal or absence of nausea and vomiting  Description: INTERVENTIONS:  - Maintain adequate hydration with IV or PO as ordered and  tolerated  - Nasogastric tube to low intermittent suction as ordered  - Evaluate effectiveness of ordered antiemetic medications  - Provide nonpharmacologic comfort measures as appropriate  - Advance diet as tolerated, if ordered  - Obtain nutritional consult as needed  - Evaluate fluid balance  Outcome: Progressing  Goal: Achieves appropriate nutritional intake (bariatric)  Description: INTERVENTIONS:  - Monitor for over-consumption  - Identify factors contributing to increased intake, treat as appropriate  - Monitor I&O, WT and lab values  - Obtain nutritional consult as needed  - Evaluate psychosocial factors contributing to over-consumption  Outcome: Progressing     Problem: SKIN/TISSUE INTEGRITY - ADULT  Goal: Incision(s), wounds(s) or drain site(s) healing without S/S of infection  Description: INTERVENTIONS:  - Assess and document risk factors for pressure ulcer development  - Assess and document skin integrity  - Assess and document dressing/incision, wound bed, drain sites and surrounding tissue  - Implement wound care per orders  - Initiate isolation precautions as appropriate  - Initiate Pressure Ulcer prevention bundle as indicated  Outcome: Progressing

## 2024-01-30 NOTE — PLAN OF CARE
Patient AO x4. Incision to abdomen CDI. V/s monitored. IVF running. Scheduled Zofran for nausea. Scheduled Tylenol and Toradol for pain. PRN Oxycodone. Ice to incision. Encouraging PO intake. Per patient hurts when she drinks. Patient aware of importance of PO intake and preventing dehydration. SCDs and Lovenox (start today) for DVT prophylaxis. Call light within reach. Fall precautions. Up to bathroom voiding. No gas or burping. Plan to increase oral intake and pain control.    Problem: Patient Centered Care  Goal: Patient preferences are identified and integrated in the patient's plan of care  Description: Interventions:  - What would you like us to know as we care for you?   - Provide timely, complete, and accurate information to patient/family  - Incorporate patient and family knowledge, values, beliefs, and cultural backgrounds into the planning and delivery of care  - Encourage patient/family to participate in care and decision-making at the level they choose  - Honor patient and family perspectives and choices  Outcome: Progressing     Problem: Patient/Family Goals  Goal: Patient/Family Long Term Goal  Description: Patient's Long Term Goal: no complications    Interventions:  - monitor pain   -monitor labs, v/s  -f/u with MD as scheduled and as needed  - See additional Care Plan goals for specific interventions  Outcome: Progressing  Goal: Patient/Family Short Term Goal  Description: Patient's Short Term Goal: pain control    Interventions:   - pain meds  -reposition  -ice  -ambulate   - See additional Care Plan goals for specific interventions  Outcome: Progressing     Problem: PAIN - ADULT  Goal: Verbalizes/displays adequate comfort level or patient's stated pain goal  Description: INTERVENTIONS:  - Encourage pt to monitor pain and request assistance  - Assess pain using appropriate pain scale  - Administer analgesics based on type and severity of pain and evaluate response  - Implement non-pharmacological  measures as appropriate and evaluate response  - Consider cultural and social influences on pain and pain management  - Manage/alleviate anxiety  - Utilize distraction and/or relaxation techniques  - Monitor for opioid side effects  - Notify MD/LIP if interventions unsuccessful or patient reports new pain  - Anticipate increased pain with activity and pre-medicate as appropriate  Outcome: Progressing     Problem: DISCHARGE PLANNING  Goal: Discharge to home or other facility with appropriate resources  Description: INTERVENTIONS:  - Identify barriers to discharge w/pt and caregiver  - Include patient/family/discharge partner in discharge planning  - Arrange for needed discharge resources and transportation as appropriate  - Identify discharge learning needs (meds, wound care, etc)  - Arrange for interpreters to assist at discharge as needed  - Consider post-discharge preferences of patient/family/discharge partner  - Complete POLST form as appropriate  - Assess patient's ability to be responsible for managing their own health  - Refer to Case Management Department for coordinating discharge planning if the patient needs post-hospital services based on physician/LIP order or complex needs related to functional status, cognitive ability or social support system  Outcome: Progressing     Problem: GASTROINTESTINAL - ADULT  Goal: Minimal or absence of nausea and vomiting  Description: INTERVENTIONS:  - Maintain adequate hydration with IV or PO as ordered and tolerated  - Nasogastric tube to low intermittent suction as ordered  - Evaluate effectiveness of ordered antiemetic medications  - Provide nonpharmacologic comfort measures as appropriate  - Advance diet as tolerated, if ordered  - Obtain nutritional consult as needed  - Evaluate fluid balance  Outcome: Progressing  Goal: Achieves appropriate nutritional intake (bariatric)  Description: INTERVENTIONS:  - Monitor for over-consumption  - Identify factors contributing  to increased intake, treat as appropriate  - Monitor I&O, WT and lab values  - Obtain nutritional consult as needed  - Evaluate psychosocial factors contributing to over-consumption  Outcome: Progressing     Problem: SKIN/TISSUE INTEGRITY - ADULT  Goal: Incision(s), wounds(s) or drain site(s) healing without S/S of infection  Description: INTERVENTIONS:  - Assess and document risk factors for pressure ulcer development  - Assess and document skin integrity  - Assess and document dressing/incision, wound bed, drain sites and surrounding tissue  - Implement wound care per orders  - Initiate isolation precautions as appropriate  - Initiate Pressure Ulcer prevention bundle as indicated  Outcome: Progressing

## 2024-01-30 NOTE — PROGRESS NOTES
Progress Note     Lashon London Patient Status:  Inpatient    10/15/1985 MRN [id number]   Location [facility] 4W/SW/SE Attending Sri Doss MD   Hosp Day # 1 PCP Meryl Parson DO     Chief Complaint: Morbid obesity    Subjective:   S: Patient here status post laparoscopic Telma-en-Y Y gastric bypass  Doing okay  Pain overall controlled  P.o. intake is marginal  Encourage ambulation  Denies chest pain dizziness shortness of breath  Review of Systems:   10 point ROS completed and was negative, except for pertinent positive and negatives stated in subjective.    Objective:   Vital signs:  Temp:  [97.9 °F (36.6 °C)-98.7 °F (37.1 °C)] 97.9 °F (36.6 °C)  Pulse:  [55-79] 79  Resp:  [16-18] 18  BP: ()/(60-71) 107/71  SpO2:  [92 %-96 %] 93 %    Wt Readings from Last 6 Encounters:   24 260 lb (117.9 kg)   24 265 lb (120.2 kg)   24 265 lb (120.2 kg)   23 253 lb (114.8 kg)   23 247 lb (112 kg)   23 252 lb 3.2 oz (114.4 kg)         Physical Exam:    General: No acute distress. Alert , Extubated.     , morbidly obese  Respiratory: Clear to auscultation bilaterally. No wheezes. No rhonchi.  Cardiovascular: S1, S2. Regular rate and rhythm. No murmurs, rubs or gallops.   Abdomen: Soft, nontender, nondistended.  Positive bowel sounds. No rebound or guarding.  Neurologic: No focal neurological deficits.   Musculoskeletal: Moves all extremities.  Extremities: No edema.    Results:   Diagnostic Data:      Labs:    Labs Last 24 Hours:   BMP     CBC    Other     Na 142 Cl 110 BUN 10 Glu 111   Hb 10.4   PTT - Procal -   K 4.4 CO2 26.0 Cr 0.81   WBC 7.1 >< .0  INR - CRP -   Renal Lytes Endo    Hct 31.4   Trop - D dim -   eGFR - Ca 8.8 POC Gluc  -    LFT   pBNP - Lactic -   eGFR AA - PO4 - A1c -   AST - APk - Prot -  LDL -     Mg - TSH -   ALT - T elizabeth - Alb -        COVID-19 Lab Results    COVID-19  Lab Results   Component Value Date    COVID19 Not Detected  08/17/2022    COVID19 Not Detected 03/29/2022    COVID19 Not Detected 03/24/2022       Pro-Calcitonin  No results for input(s): \"PCT\" in the last 168 hours.    Cardiac  No results for input(s): \"TROP\", \"PBNP\" in the last 168 hours.    Creatinine Kinase  No results for input(s): \"CK\" in the last 168 hours.    Inflammatory Markers  No results for input(s): \"CRP\", \"JEAN PIERRE\", \"LDH\", \"DDIMER\" in the last 168 hours.    Imaging: Imaging data reviewed in Epic.    Medications:    pantoprazole  40 mg Oral QAM AC    acetaminophen  1,000 mg Oral Q8H    ketorolac  15 mg Intravenous Q6H    ondansetron  4 mg Intravenous q6h    enoxaparin  40 mg Subcutaneous Daily    FLUoxetine HCl  40 mg Oral Daily    QUEtiapine  25 mg Oral Nightly    topiramate  50 mg Oral BID       Assessment & Plan:   ASSESSMENT / PLAN:     Morbid obesity  -Status post Telma-en-Y gastric bypass surgery with EGD  -Pain control  -Clear liquid diet  -Lovenox for DVT prophylaxis--> with history of PE okay to resume home Eliquis 2.5 mg dosing tomorrow per bariatrics  -Antiemetics  -Monitor hemodynamics    History of PE in 2021  -Resume home Eliquis 2.5 twice daily    History of asthma  History of depression and posttraumatic stress disorder  History of lumbar laminectomy        Quality:  DVT Prophylaxis: Lovenox  CODE status: Full code  Henson:    Central line: n/a      Will the patient be referred to [facility] on discharge?:  Yes  Estimated date of discharge: Tomorrow  Discharge is dependent on: Improved p.o. clear liquid intake  At this point Ms. London is expected to be discharge to: Home    Plan of care discussed with patient and nursing    Coordinated care with providers and counseling re: treatment plan and workup    MDM: High complexity     CYNDI COBB MD    Supplementary Documentation:

## 2024-01-31 VITALS
WEIGHT: 260 LBS | OXYGEN SATURATION: 97 % | TEMPERATURE: 97 F | DIASTOLIC BLOOD PRESSURE: 72 MMHG | SYSTOLIC BLOOD PRESSURE: 117 MMHG | HEART RATE: 76 BPM | HEIGHT: 67 IN | RESPIRATION RATE: 18 BRPM | BODY MASS INDEX: 40.81 KG/M2

## 2024-01-31 LAB
DEPRECATED RDW RBC AUTO: 43.8 FL (ref 35.1–46.3)
ERYTHROCYTE [DISTWIDTH] IN BLOOD BY AUTOMATED COUNT: 13.2 % (ref 11–15)
HCT VFR BLD AUTO: 30.5 %
HGB BLD-MCNC: 9.8 G/DL
MCH RBC QN AUTO: 29 PG (ref 26–34)
MCHC RBC AUTO-ENTMCNC: 32.1 G/DL (ref 31–37)
MCV RBC AUTO: 90.2 FL
PLATELET # BLD AUTO: 180 10(3)UL (ref 150–450)
RBC # BLD AUTO: 3.38 X10(6)UL
WBC # BLD AUTO: 4.4 X10(3) UL (ref 4–11)

## 2024-01-31 PROCEDURE — 99239 HOSP IP/OBS DSCHRG MGMT >30: CPT | Performed by: HOSPITALIST

## 2024-01-31 NOTE — PROGRESS NOTES
[facility]  Sim Álvarez / Adalid / Lesa / Macy / Felix  Office - [phone]  Answering Service [phone]      Progress Note    Lashon London Patient Status:  Inpatient    10/15/1985 MRN [id number]   Location [facility] 4W/SW/SE Attending Ethan Mcfadden MD   Hosp Day # 2 PCP Meryl Parson, DO     Subjective:  Has not been documenting intake well, sounds inadequate, pain on L    Objective/Physical Exam:  Temp:  [97.8 °F (36.6 °C)-98.4 °F (36.9 °C)] 97.8 °F (36.6 °C)  Pulse:  [66-79] 72  Resp:  [16-22] 16  BP: (107-123)/(68-72) 116/72  SpO2:  [93 %-97 %] 94 %    Intake/Output Summary (Last 24 hours) at 2024 0811  Last data filed at 2024 0547  Gross per 24 hour   Intake 4058.5 ml   Output 1200 ml   Net 2858.5 ml       General:    Nad, in bed  Lungs:  unlabored  CV:  Regular, pulse 72  Abdomen:  Soft, wounds clean, mild retraction on L, approp ttp  Current Meds:        Labs:  Lab Results   Component Value Date    WBC 4.4 2024    HGB 9.8 (L) 2024    HCT 30.5 (L) 2024    .0 2024     2024    K 4.4 2024    CO2 26.0 2024    BUN 10 2024     (H) 2024       Imaging:  No results found.    Assessment/Plan:  S/p gastric bypass, doing ok, still needs to drink more.  Slight hgb drop from surgery and dilution, labs do not suggest significant bleed.    - cont liquids, discussed goal of 3-4 oz per hour before discharge home. She is going to set an alarm for every 15 minutes as a reminder.  - importance of hydration discussed with pt who verbalizes understanding  - DC home today if able to tolerate liquids well.   - FU in clinic next week    Alejandro Washington MD, 24, 8:13 AM

## 2024-01-31 NOTE — DISCHARGE INSTRUCTIONS
Discharge Instructions for Bariatric Surgery         You have had a procedure called bariatric surgery that should help you lose weight and decrease your risk of health problems such as diabetes, respiratory problems, and coronary artery disease. During this procedure, a doctor surgically changed your stomach (and maybe small intestine as well). Now that your stomach is smaller in size, it can only hold a small amount of food at one time and affects how you absorb your nutrients.    This surgery  makes it difficult for you to eat large amounts of solid foods and will require you to  eat very small meals.  If you eat too much food, or eat too fast, you can experience unpleasant symptoms such as nausea, vomiting, or pain in your upper belly (abdomen).         It is important for you to make lifelong behavior changes to lose and keep off the weight as part of your bariatric surgery post-operative plan         How You Should Eat After Bariatric Surgery:    ·         Stage I: Bariatric Clear Liquid Diet will begin during your hospitalization.    ·         Stage II: Begin the Bariatric Full Liquid Diet on Thursday (unless otherwise instructed) and stay on this diet for one (1) week.    ·         Stage III: Start the Bariatric Pureed Diet and follow this for one (1) week before advancing to the next stage.    ·         Stage IV: You may now begin the Bariatric Soft Diet.    ·         If you experience abdominal discomfort while advancing your diet through these stages, you may revert back to the previous diet stage until your symptoms subside. Please refer to your Bariatric Surgery Manual for recommendations.    ·         It is important to follow these diet recommendations as instructed. Introducing semi-solid food or a solid diet too early may lead to blockage, pain and vomiting and can create stress on the newly formed stomach.    ·         It is important that you continue to drink liquids throughout the day so that  you do not become dehydrated. Some signs of dehydration include dry mouth and dark urine. Aim for 64 ounces of water per day.    ·         You will need to drink fluids in smaller amounts than you are used to, to make it easier for your body to absorb the liquid. .    ·         Liquids should be avoided for a period of 15 minutes before and 30 minutes after eating solid food or meals.    ·         Avoid drinks that are caffeinated, sugary and/or carbonated.    ·         Eat slowly. Plan on taking at least 20-30 minutes to finish a meal. Eating too much or too fast may cause pain, nausea and vomiting. Remember \"slowly, small, moist, and easy\" when eating.    ·         Take small bites of food.    ·         To help as a visual aid, use a saucer or small plate in place of a full-size dinner plate to help with portion control.    ·         Pay attention to taste. Learn how to savor food.    ·         Have three (3) meals per day and (1) small snack. These meals should be spaced out and should correspond to standard mealtimes (breakfast, lunch, afternoon snack and dinner).    ·         \"Grazing\" or eating frequent, small amounts of food throughout the day will sabotage weight loss.    ·         Eat protein first, then vegetables, followed by fruits and high-quality carbohydrates or starches.    ·         Stop eating before you feel full.    ·         To avoid the unpleasant effects of “dumping syndrome”  that can happen after gastric bypass surgery, avoid eating foods high in sugar or fat.    ·         Dumping Syndrome symptoms include abdominal pain, nausea, sweating, and bloating. Sometimes diarrhea can happen 60-90 minutes after eating. In some cases, fainting may occur.    ·         Take vitamin supplements as directed by your doctor and dietitian.         Activity    ·         Keep in mind that recovery after surgery can take several weeks. It is normal to feel tired, so rest as needed and do not over-exert  yourself.    ·         Walk as often as you feel able and increase your daily activity slowly.    ·         Do not lift anything heavier than twenty (20) pounds.    ·         Avoid strenuous chores such as vacuuming, lifting full bags of garbage, or carrying groceries until your doctor says it is okay to do so.    ·         Climb stairs slowly and pause every few steps.    ·         You can benefit from simple activities such as walking or gardening.    ·         Ask your doctor or the Exercise Physiologist on how and when to get started on an exercise program.         Home Care    ·         Continue to use your Incentive Spirometer and coughing and deep breathing exercises you learned in the hospital.    ·         Shower daily. Avoid baths, swimming pools, and hot tubs for 1 to 2 weeks after going home. This helps prevent infection at the incision site.    ·         Keep the incision clean and dry. Do not rub your incision when washing and drying.    ·         Wash your incision gently with a mild soap and warm water and then gently pat the incision dry with a towel.    ·         Follow the doctor's instructions about caring for the dressing covering your incision.    ·         If your doctor used Steri-Strips (small white adhesive strips) to close the incision, do not remove them. Let the strips fall of on their own. If they don't come off within two (2) weeks after you were sent home, call your doctor.    ·         Take your medications in crushed or liquid form as instructed by your surgeon or bariatrician.    ·         Keep in mind that your medications might need to be adjusted as you lose weight. Ask your doctor about what changes you should make to your medications.    ·         If you use a CPAP or BiPAP machine for sleep apnea, don't stop using it without talking to your doctor.    ·         Learn to take your own pulse (heart rate) and keep a record of your results.    ·         Ask your doctor when you  can start driving again.    ·         DO NOT drive if you are taking pain medication.    ·         Ask your doctor when you can expect to return to work.         When to Call the Doctor    Call your doctor right away if you have any of the following:    ·         Cloudy or smelly drainage from the incision site    ·         Redness, pain, or increased swelling at the incision site    ·         Fever of 100.4 F (38 C) or higher, or shaking chills    ·         Pulse (heart rate) over 110    ·         Night sweats    ·         Swelling or pain in your calves    ·         Persistent pain, nausea, or left shoulder discomfort    ·         Persistent hiccups    ·         Confusion, depression, or unusual fatigue    ·         Signs of bladder infection (urinating more often than usual, burning, pain, bleeding, or hesitancy when you urinate)    ·         Signs of dehydration (decreased and/or dark urine, dry mouth, decreased skin elasticity.    ·         If you are experiencing sudden difficulty breathing, shortness of breath, or chest pain do not call your doctor, CALL 911 or GO TO THE NEAREST EMERGENCY ROOM IMMEDIATELY

## 2024-01-31 NOTE — DISCHARGE SUMMARY
[facility]    Discharge Summary    Lashon London Patient Status:  Inpatient    10/15/1985 MRN [id number]   Location [facility] 4W/SW/SE Attending Marissa Velez MD   Hosp Day # 2 PCP Meryl Parson DO     Date of Admission: 2024      Date of Discharge: 24      Admitting Diagnosis: Obesity, BMI, back pain, asthma, iron deficiency anemia  Morbid (severe) obesity due to excess calories (HCC)    Hospital Discharge Diagnoses:  Obesity    Lace+ Score: 41  59-90 High Risk  29-58 Medium Risk  0-28   Low Risk.    TCM Follow-Up Recommendation:  LACE 29-58: Moderate Risk of readmission after discharge from the hospital.          Problem List:   Patient Active Problem List   Diagnosis    Primary ovarian failure    Other type of migraine    GERD without esophagitis    DDD (degenerative disc disease), lumbar    Lumbar spondylosis    Lumbar radiculitis    Annular tear of lumbar disc    Obesity (BMI 30-39.9)    Low vitamin D level    Low vitamin B12 level    Abnormal celiac antibody panel    Chronic RUQ pain    HNP (herniated nucleus pulposus), lumbar    Work related injury    PTSD (post-traumatic stress disorder)    Suicide attempt (HCC)    Asthma    Ground glass opacity present on imaging of lung    Metabolic acidosis due to ingestion of drugs or chemicals    Respiratory alkalosis    Incidental lung nodule, > 3mm and < 8mm    Long term (current) use of hormonal contraceptives    NSAID overdose    Hx of pulmonary embolus    Overdose of nonsteroidal anti-inflammatory drug (NSAID), accidental or unintentional, initial encounter    RAVI (acute kidney injury) (HCC)    Hypophosphatemia    Anemia    Acute kidney injury (HCC)    Acute renal failure, unspecified acute renal failure type (HCC)    Flank pain    Chronic pain syndrome    Suicidal ideation    Acute delirium    Major depressive disorder    Iron deficiency anemia, unspecified    Dyspnea    Chest pain, atypical     Medication reaction, initial encounter    Hyperventilation syndrome    Fall    Constipation    Left carpal tunnel syndrome    Iron deficiency anemia secondary to blood loss (chronic)    Iron deficiency anemia refractory to iron therapy    Morbid obesity with BMI of 40.0-44.9, adult (HCC)    Stress    Morbid (severe) obesity due to excess calories ([facility])         Physical Exam:     Gen: No acute distress  Pulm: Lungs clear, normal respiratory effort  CV: Heart with regular rate and rhythm  Abd: Abdomen soft  Neuro: No acute focal deficits  MSK: Full range of motion in extremities  Skin: Warm and dry  Psych: Normal affect  Ext: no c/c/e      History of Present Illness: Patient with morbid obesity presents for gastric bypass surgery.     Hospital Course:     Morbid obesity  -Status post Telma-en-Y gastric bypass surgery with EGD  -Pain controlled  -Post op diet as tolerated  -Lovenox for DVT prophylaxis--> with history of PE okay to resume home Eliquis 2.5 mg dosing per bariatrics     History of PE in 2021  -Resume home Eliquis 2.5 twice daily     History of asthma  History of depression and posttraumatic stress disorder  History of lumbar laminectomy       Discharge Condition: Stable    Discharge Medications:      Discharge Medications        START taking these medications        Instructions Prescription details   acetaminophen 160 MG/5ML Soln  Commonly known as: TYLENOL      Take 20.3 mL (650 mg total) by mouth every 6 (six) hours as needed for Fever or Pain.   Quantity: 500 mL  Refills: 1     Naloxone HCl 4 MG/0.1ML Liqd      4 mg by Nasal route as needed. If patient remains unresponsive, repeat dose in other nostril 2-5 minutes after first dose.   Quantity: 1 kit  Refills: 0     oxyCODONE 5 MG Tabs      Take 1 tablet (5 mg total) by mouth every 4 (four) hours as needed for Pain.   Quantity: 10 tablet  Refills: 0     pantoprazole 40 MG Tbec  Commonly known as: Protonix      Take 1 tablet (40 mg total) by mouth  daily.   Quantity: 30 tablet  Refills: 2            CONTINUE taking these medications        Instructions Prescription details   albuterol 108 (90 Base) MCG/ACT Aers  Commonly known as: ProAir HFA      Inhale 2 puffs into the lungs every 6 (six) hours as needed for Wheezing.   Quantity: 3 each  Refills: 0     albuterol (2.5 MG/3ML) 0.083% Nebu  Commonly known as: Ventolin      Take 3 mL (2.5 mg total) by nebulization every 4 (four) hours as needed for Wheezing.   Quantity: 15 each  Refills: 2     apixaban 2.5 MG Tabs  Commonly known as: Eliquis      Take 1 tablet (2.5 mg total) by mouth 2 (two) times daily.   Quantity: 30 tablet  Refills: 0     aprepitant 40 MG Caps  Commonly known as: EMEND      Take 1 capsule (40 mg total) by mouth daily.   Quantity: 1 capsule  Refills: 0     cetirizine 10 MG Tabs  Commonly known as: ZyrTEC      Take 1 tablet (10 mg total) by mouth in the morning, at noon, and at bedtime.   Refills: 0     clonazePAM 0.5 MG Tabs  Commonly known as: KlonoPIN      Take 1 to 2 tablets (0.5mg to 1mg) by mouth twice daily   Quantity: 120 tablet  Refills: 2     drospirenone-ethinyl estradiol 3-0.03 MG Tabs  Commonly known as: Ocella      Take 1 tablet by mouth daily.   Quantity: 28 tablet  Refills: 2     FLUoxetine HCl 40 MG Caps  Commonly known as: PROZAC      Take 1 capsule (40 mg total) by mouth daily.   Quantity: 90 capsule  Refills: 1     prochlorperazine 10 mg tablet  Commonly known as: Compazine      Take 1 tablet (10 mg total) by mouth every 6 (six) hours as needed for Nausea.   Quantity: 30 tablet  Refills: 3     QUEtiapine 25 MG Tabs  Commonly known as: SEROquel      Take 1-2 tablets (25-50 mg total) by mouth nightly.   Quantity: 30 tablet  Refills: 2     topiramate 25 MG Tabs  Commonly known as: TopaMAX      Take 2 tablets (50 mg total) by mouth 2 (two) times daily.   Quantity: 120 tablet  Refills: 2     Ubrelvy 100 MG Tabs  Generic drug: ubrogepant      Take 100 mg by mout may repeat once 2  hours later in same day max 2 per 24 hours.   Quantity: 10 tablet  Refills: 2               Where to Get Your Medications        These medications were sent to [id number] - [place], [place] - [address] [phone], [phone]  [address] [facility] [address]      Hours: 24-hours Phone: [phone]   acetaminophen 160 MG/5ML Soln  Naloxone HCl 4 MG/0.1ML Liqd  oxyCODONE 5 MG Tabs  pantoprazole 40 MG Tbec             Marissa Velez MD  1/31/2024  10:59 AM    Greater than 30 minutes spent on preparation and coordination of this discharge

## 2024-01-31 NOTE — PLAN OF CARE
Patient has safety precautions in place bed in the lowest position, bed alarm on, and call light within reach. Plan of care ongoing. No further concerns as of present.    Problem: Patient Centered Care  Goal: Patient preferences are identified and integrated in the patient's plan of care  Description: Interventions:  - Provide timely, complete, and accurate information to patient/family  - Incorporate patient and family knowledge, values, beliefs, and cultural backgrounds into the planning and delivery of care  - Encourage patient/family to participate in care and decision-making at the level they choose  - Honor patient and family perspectives and choices  Outcome: Progressing     Problem: PAIN - ADULT  Goal: Verbalizes/displays adequate comfort level or patient's stated pain goal  Description: INTERVENTIONS:  - Encourage pt to monitor pain and request assistance  - Assess pain using appropriate pain scale  - Administer analgesics based on type and severity of pain and evaluate response  - Implement non-pharmacological measures as appropriate and evaluate response  - Consider cultural and social influences on pain and pain management  - Manage/alleviate anxiety  - Utilize distraction and/or relaxation techniques  - Monitor for opioid side effects  - Notify MD/LIP if interventions unsuccessful or patient reports new pain  - Anticipate increased pain with activity and pre-medicate as appropriate  Outcome: Progressing     Problem: DISCHARGE PLANNING  Goal: Discharge to home or other facility with appropriate resources  Description: INTERVENTIONS:  - Identify barriers to discharge w/pt and caregiver  - Include patient/family/discharge partner in discharge planning  - Arrange for needed discharge resources and transportation as appropriate  - Identify discharge learning needs (meds, wound care, etc)  - Arrange for interpreters to assist at discharge as needed  - Consider post-discharge preferences of  patient/family/discharge partner  - Complete POLST form as appropriate  - Assess patient's ability to be responsible for managing their own health  - Refer to Case Management Department for coordinating discharge planning if the patient needs post-hospital services based on physician/LIP order or complex needs related to functional status, cognitive ability or social support system  Outcome: Progressing     Problem: GASTROINTESTINAL - ADULT  Goal: Minimal or absence of nausea and vomiting  Description: INTERVENTIONS:  - Maintain adequate hydration with IV or PO as ordered and tolerated  - Nasogastric tube to low intermittent suction as ordered  - Evaluate effectiveness of ordered antiemetic medications  - Provide nonpharmacologic comfort measures as appropriate  - Advance diet as tolerated, if ordered  - Obtain nutritional consult as needed  - Evaluate fluid balance  Outcome: Progressing  Goal: Achieves appropriate nutritional intake (bariatric)  Description: INTERVENTIONS:  - Monitor for over-consumption  - Identify factors contributing to increased intake, treat as appropriate  - Monitor I&O, WT and lab values  - Obtain nutritional consult as needed  - Evaluate psychosocial factors contributing to over-consumption  Outcome: Progressing     Problem: SKIN/TISSUE INTEGRITY - ADULT  Goal: Incision(s), wounds(s) or drain site(s) healing without S/S of infection  Description: INTERVENTIONS:  - Assess and document risk factors for pressure ulcer development  - Assess and document skin integrity  - Assess and document dressing/incision, wound bed, drain sites and surrounding tissue  - Implement wound care per orders  - Initiate isolation precautions as appropriate  - Initiate Pressure Ulcer prevention bundle as indicated  Outcome: Progressing

## 2024-02-01 ENCOUNTER — PATIENT OUTREACH (OUTPATIENT)
Dept: CASE MANAGEMENT | Age: 39
End: 2024-02-01

## 2024-02-01 ENCOUNTER — TELEPHONE (OUTPATIENT)
Dept: SURGERY | Facility: CLINIC | Age: 39
End: 2024-02-01

## 2024-02-01 NOTE — PAYOR COMM NOTE
--------------  DISCHARGE REVIEW-----REQUESTING ADDITIONAL DAY 1/30 WITH DISCHARGE ON 1/31      Payor: [facility] PPO  Subscriber #:  [id number]  Authorization Number: [id number]    Admit date: 1/29/24  Admit time:   5:42 AM  Discharge Date: 1/31/2024  3:08 PM     Admitting Physician: Ethan Mcfadden MD  Attending Physician:  No att. providers found  Primary Care Physician: Meryl Parson DO    1/30  Chief Complaint: Morbid obesity     Subjective:   S: Patient here status post laparoscopic Telma-en-Y Y gastric bypass  Doing okay  Pain overall controlled  P.o. intake is marginal  Encourage ambulation  Denies chest pain dizziness shortness of breath  Review of Systems:   10 point ROS completed and was negative, except for pertinent positive and negatives stated in subjective.     Objective:   Vital signs:  Temp:  [97.9 °F (36.6 °C)-98.7 °F (37.1 °C)] 97.9 °F (36.6 °C)  Pulse:  [55-79] 79  Resp:  [16-18] 18  BP: ()/(60-71) 107/71  SpO2:  [92 %-96 %] 93 %         Wt Readings from Last 6 Encounters:   01/29/24 260 lb (117.9 kg)   01/11/24 265 lb (120.2 kg)   01/11/24 265 lb (120.2 kg)   12/14/23 253 lb (114.8 kg)   12/07/23 247 lb (112 kg)   11/09/23 252 lb 3.2 oz (114.4 kg)            Physical Exam:    General: No acute distress. Alert , Extubated.     , morbidly obese  Respiratory: Clear to auscultation bilaterally. No wheezes. No rhonchi.  Cardiovascular: S1, S2. Regular rate and rhythm. No murmurs, rubs or gallops.   Abdomen: Soft, nontender, nondistended.  Positive bowel sounds. No rebound or guarding.  Neurologic: No focal neurological deficits.   Musculoskeletal: Moves all extremities.  Extremities: No edema.     Results:   Diagnostic Data:       Labs:     Labs Last 24 Hours:                      BMP         CBC       Other      Na 142 Cl 110 BUN 10 Glu 111     Hb 10.4     PTT - Procal -    K 4.4 CO2 26.0 Cr 0.81     WBC 7.1 >< .0   INR - CRP -    Renal Lytes Endo       Hct 31.4     Trop - D dim -    eGFR -  Ca 8.8 POC Gluc  -       LFT     pBNP - Lactic -    eGFR AA - PO4 - A1c -     AST - APk - Prot -   LDL -        Mg - TSH -     ALT - T elizabeth - Alb -                 Medications:    pantoprazole  40 mg Oral QAM AC    acetaminophen  1,000 mg Oral Q8H    ketorolac  15 mg Intravenous Q6H    ondansetron  4 mg Intravenous q6h    enoxaparin  40 mg Subcutaneous Daily    FLUoxetine HCl  40 mg Oral Daily    QUEtiapine  25 mg Oral Nightly    topiramate  50 mg Oral BID         Assessment & Plan:   ASSESSMENT / PLAN:      Morbid obesity  -Status post Telma-en-Y gastric bypass surgery with EGD  -Pain control  -Clear liquid diet  -Lovenox for DVT prophylaxis--> with history of PE okay to resume home Eliquis 2.5 mg dosing tomorrow per bariatrics  -Antiemetics  -Monitor hemodynamics     History of PE in   -Resume home Eliquis 2.5 twice daily     History of asthma  History of depression and posttraumatic stress disorder  History of lumbar laminectomy           Quality:  DVT Prophylaxis: Lovenox  CODE status: Full code  Henson:    Central line: n/a        Will the patient be referred to TCC on discharge?:  Yes  Estimated date of discharge: Tomorrow  Discharge is dependent on: Improved p.o. clear liquid intake  At this point Ms. London is expected to be discharge to: Home     Plan of care discussed with patient and nursing     Coordinated care with providers and counseling re: treatment plan and workup     MDM: High complexity     CYNDI COBB MD             Discharge Summary Notes        Discharge Summary signed by Marissa Velez MD at 2024 11:01 AM       Author: Marissa Velez MD Specialty: HOSPITALIST, Internal Medicine Author Type: Physician    Filed: 2024 11:01 AM Date of Service: 2024 10:59 AM Status: Signed    : Marissa Velez MD (Physician)           [facility]    Discharge Summary    Lashon London Patient Status:  Inpatient    10/15/1985 MRN  [id number]   Location [facility] 4W/SW/SE Attending Marissa Velez MD   Hosp Day # 2 PCP Meryl Parson DO     Date of Admission: 1/29/2024      Date of Discharge: 01/31/24      Admitting Diagnosis: Obesity, BMI, back pain, asthma, iron deficiency anemia  Morbid (severe) obesity due to excess calories (HCC)    Hospital Discharge Diagnoses:  Obesity    Lace+ Score: 41  59-90 High Risk  29-58 Medium Risk  0-28   Low Risk.    TCM Follow-Up Recommendation:  LACE 29-58: Moderate Risk of readmission after discharge from the hospital.          Problem List:   Patient Active Problem List   Diagnosis    Primary ovarian failure    Other type of migraine    GERD without esophagitis    DDD (degenerative disc disease), lumbar    Lumbar spondylosis    Lumbar radiculitis    Annular tear of lumbar disc    Obesity (BMI 30-39.9)    Low vitamin D level    Low vitamin B12 level    Abnormal celiac antibody panel    Chronic RUQ pain    HNP (herniated nucleus pulposus), lumbar    Work related injury    PTSD (post-traumatic stress disorder)    Suicide attempt (HCC)    Asthma    Ground glass opacity present on imaging of lung    Metabolic acidosis due to ingestion of drugs or chemicals    Respiratory alkalosis    Incidental lung nodule, > 3mm and < 8mm    Long term (current) use of hormonal contraceptives    NSAID overdose    Hx of pulmonary embolus    Overdose of nonsteroidal anti-inflammatory drug (NSAID), accidental or unintentional, initial encounter    RAVI (acute kidney injury) (HCC)    Hypophosphatemia    Anemia    Acute kidney injury (HCC)    Acute renal failure, unspecified acute renal failure type (HCC)    Flank pain    Chronic pain syndrome    Suicidal ideation    Acute delirium    Major depressive disorder    Iron deficiency anemia, unspecified    Dyspnea    Chest pain, atypical    Medication reaction, initial encounter    Hyperventilation syndrome    Fall    Constipation    Left carpal tunnel syndrome    Iron  deficiency anemia secondary to blood loss (chronic)    Iron deficiency anemia refractory to iron therapy    Morbid obesity with BMI of 40.0-44.9, adult (HCC)    Stress    Morbid (severe) obesity due to excess calories (HCC)         Physical Exam:     Gen: No acute distress  Pulm: Lungs clear, normal respiratory effort  CV: Heart with regular rate and rhythm  Abd: Abdomen soft  Neuro: No acute focal deficits  MSK: Full range of motion in extremities  Skin: Warm and dry  Psych: Normal affect  Ext: no c/c/e      History of Present Illness: Patient with morbid obesity presents for gastric bypass surgery.     Hospital Course:     Morbid obesity  -Status post Telma-en-Y gastric bypass surgery with EGD  -Pain controlled  -Post op diet as tolerated  -Lovenox for DVT prophylaxis--> with history of PE okay to resume home Eliquis 2.5 mg dosing per bariatrics     History of PE in 2021  -Resume home Eliquis 2.5 twice daily     History of asthma  History of depression and posttraumatic stress disorder  History of lumbar laminectomy       Discharge Condition: Stable    Discharge Medications:      Discharge Medications        START taking these medications        Instructions Prescription details   acetaminophen 160 MG/5ML Soln  Commonly known as: TYLENOL      Take 20.3 mL (650 mg total) by mouth every 6 (six) hours as needed for Fever or Pain.   Quantity: 500 mL  Refills: 1     Naloxone HCl 4 MG/0.1ML Liqd      4 mg by Nasal route as needed. If patient remains unresponsive, repeat dose in other nostril 2-5 minutes after first dose.   Quantity: 1 kit  Refills: 0     oxyCODONE 5 MG Tabs      Take 1 tablet (5 mg total) by mouth every 4 (four) hours as needed for Pain.   Quantity: 10 tablet  Refills: 0     pantoprazole 40 MG Tbec  Commonly known as: Protonix      Take 1 tablet (40 mg total) by mouth daily.   Quantity: 30 tablet  Refills: 2            CONTINUE taking these medications        Instructions Prescription details   albuterol  108 (90 Base) MCG/ACT Aers  Commonly known as: ProAir HFA      Inhale 2 puffs into the lungs every 6 (six) hours as needed for Wheezing.   Quantity: 3 each  Refills: 0     albuterol (2.5 MG/3ML) 0.083% Nebu  Commonly known as: Ventolin      Take 3 mL (2.5 mg total) by nebulization every 4 (four) hours as needed for Wheezing.   Quantity: 15 each  Refills: 2     apixaban 2.5 MG Tabs  Commonly known as: Eliquis      Take 1 tablet (2.5 mg total) by mouth 2 (two) times daily.   Quantity: 30 tablet  Refills: 0     aprepitant 40 MG Caps  Commonly known as: EMEND      Take 1 capsule (40 mg total) by mouth daily.   Quantity: 1 capsule  Refills: 0     cetirizine 10 MG Tabs  Commonly known as: ZyrTEC      Take 1 tablet (10 mg total) by mouth in the morning, at noon, and at bedtime.   Refills: 0     clonazePAM 0.5 MG Tabs  Commonly known as: KlonoPIN      Take 1 to 2 tablets (0.5mg to 1mg) by mouth twice daily   Quantity: 120 tablet  Refills: 2     drospirenone-ethinyl estradiol 3-0.03 MG Tabs  Commonly known as: Ocella      Take 1 tablet by mouth daily.   Quantity: 28 tablet  Refills: 2     FLUoxetine HCl 40 MG Caps  Commonly known as: PROZAC      Take 1 capsule (40 mg total) by mouth daily.   Quantity: 90 capsule  Refills: 1     prochlorperazine 10 mg tablet  Commonly known as: Compazine      Take 1 tablet (10 mg total) by mouth every 6 (six) hours as needed for Nausea.   Quantity: 30 tablet  Refills: 3     QUEtiapine 25 MG Tabs  Commonly known as: SEROquel      Take 1-2 tablets (25-50 mg total) by mouth nightly.   Quantity: 30 tablet  Refills: 2     topiramate 25 MG Tabs  Commonly known as: TopaMAX      Take 2 tablets (50 mg total) by mouth 2 (two) times daily.   Quantity: 120 tablet  Refills: 2     Ubrelvy 100 MG Tabs  Generic drug: ubrogepant      Take 100 mg by mout may repeat once 2 hours later in same day max 2 per 24 hours.   Quantity: 10 tablet  Refills: 2               Where to Get Your Medications        These  medications were sent to [id number] - [place], [place] - [address] [phone], [phone]  [address] [place] [address]      Hours: 24-hours Phone: [phone]   acetaminophen 160 MG/5ML Soln  Naloxone HCl 4 MG/0.1ML Liqd  oxyCODONE 5 MG Tabs  pantoprazole 40 MG Tbec             Marissa Velez MD  1/31/2024  10:59 AM    Greater than 30 minutes spent on preparation and coordination of this discharge      Electronically signed by Marissa Velez MD on 1/31/2024 11:01 AM         REVIEWER COMMENTS

## 2024-02-05 PROBLEM — Z98.84 GASTRIC BYPASS STATUS FOR OBESITY: Status: ACTIVE | Noted: 2024-02-05

## 2024-02-05 NOTE — PROGRESS NOTES
[facility] / [facility]  Sim Álvarez / Adalid / Lesa / Macy / Felix  Office - [phone]  Answering Service [phone]        PCP: Eb Jiang: Magalis        Highest Preop weight      260.0     40.7  12/14/23 - 253.0      39.6  1/29/24 - RYGB         History of Present Illness:  Lashon London is a 37yo female s/p lap RYGB 1/29/24 2/8/24 - has been feeling well overall.  Left sided abdominal pain, slow improvement.  Was getting better, than was playing with the dog at the dog park yestyerday and pain kicked up a significant notch.    Only drinking about 20-30oz water/day.  Says that when she drinks about an ounce, gets full and uncomfortable and it takes about 20 to 30 minutes for that pain to ease up enough where she can drink more.  Drinking about 2 ounces a day.  Was able to drink about 1/2 ounces from a without much issue.    Tried milk - got diarrhea right afterwards.   Tried core protein shake - drank 1/2 in 6 hours.    Tried pureed carrots /cauliflorwer on Tuesday - didn't tolerate it well.       Vitamins: Choice- 2x/day.      MRI - subcm cystic foci in pancreatic tail - rec MRCP in 12 months.           Preop Labs  6/29/23 - Vit D 27, B12 267, all else good        Past Medical History:    Migraines  Severe back issues - spinal cord stimulator   Asthma  GERD  Depression / Anxiety  PE - 2021 - Dr. 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       Lashon London is a 37yo female s/p lap RYGB  1/29/24  Doing well overall.  Pain well controlled and improving.   Current main concern is fluid intake.  Reiterated goal of 64 ounces a day but even if we can get her up to 40 or 50 ounces that would be good.  She does not look dehydrated enough for I need to send her to the ER for IV fluids, but really encouraged her to push her fluid intake and want to have short-term follow-up to ensure that this is improving.    She will follow-up with me next week    OK to increase physical activity slowly as tolerated.  However, be cautious with more physical activity as I think some of the left-sided pain is likely musculoskeletal from overdoing it at the dog park yesterday.  Postop orientation given.   WIll make followup w dietitian and bariatrician.     Return precautions discussed and will call me if her symptoms worsen or her fluid intake worsens, but hopefully we can tread water enough to improve her clear liquid intake.  Hold off on advancing to puréed, can do some protein shakes if her fluid intake is improving.  Would stick to 1-2 vitamins a day until her fluid intake improves as well

## 2024-02-08 ENCOUNTER — DOCUMENTATION ONLY (OUTPATIENT)
Dept: SURGERY | Facility: CLINIC | Age: 39
End: 2024-02-08

## 2024-02-08 ENCOUNTER — OFFICE VISIT (OUTPATIENT)
Dept: SURGERY | Facility: CLINIC | Age: 39
End: 2024-02-08
Payer: COMMERCIAL

## 2024-02-08 VITALS
TEMPERATURE: 97 F | SYSTOLIC BLOOD PRESSURE: 110 MMHG | DIASTOLIC BLOOD PRESSURE: 80 MMHG | HEART RATE: 91 BPM | HEIGHT: 67 IN | BODY MASS INDEX: 38.3 KG/M2 | WEIGHT: 244 LBS | OXYGEN SATURATION: 98 %

## 2024-02-08 NOTE — PROGRESS NOTES
Provided/reviewed bariatric post-op orientation and Bariatric HELP card; instructed pt to aim for 64 ozs fluids/day; currently drinking ~ 24 ozs/day stressed importance to increase to 48 ozs then 64 ozs/day; taking Bariatric Choice 2x/day, will work up to 4x/day; meds to take/avoid; activities/allowed/avoid; signs and symptoms of concern; contact info; also instructed pt to keep card in wallet and in the unlikely event pt ends up int ED/IC/UC to present card to MD and inform bariatric staff; pt agreed and verbalized understanding.

## 2024-02-12 ENCOUNTER — TELEPHONE (OUTPATIENT)
Dept: SURGERY | Facility: CLINIC | Age: 39
End: 2024-02-12

## 2024-02-12 NOTE — TELEPHONE ENCOUNTER
----- Message from Susannah Harmon RD sent at 2/8/2024  1:32 PM CST -----  Regarding: ANAMIKA post-op follow-up  [address] schedule a post-op f/u with the RD in 3-4 weeks.    Thank you,    Susannah

## 2024-02-15 ENCOUNTER — OFFICE VISIT (OUTPATIENT)
Dept: SURGERY | Facility: CLINIC | Age: 39
End: 2024-02-15
Payer: COMMERCIAL

## 2024-02-15 VITALS
DIASTOLIC BLOOD PRESSURE: 80 MMHG | TEMPERATURE: 97 F | BODY MASS INDEX: 37.83 KG/M2 | HEART RATE: 76 BPM | HEIGHT: 67 IN | OXYGEN SATURATION: 99 % | WEIGHT: 241 LBS | SYSTOLIC BLOOD PRESSURE: 116 MMHG

## 2024-02-15 NOTE — PROGRESS NOTES
[facility] / [facility]  Sim Álvarez / Adalid / Lesa / Macy / Felix  Office - [phone]  Answering Service [phone]            PCP: Eb Jiang: Magalis        Highest Preop weight      260.0     40.7  1/29/24 - RYGB         39yo female s/p lap RYGB 1/29/24    2/15/24 - Tolerating one protein shake/day.  ([employer] lean), skim milk (didn't tolerate).    Started some pureed - apple sauce, mashed potatoes, sweet potato.  Left sided pain improving - still takes tylenol occasionally.    Back to normal day to day activity.     Vitamins: Bariatric choice two 2x/day.       Past Medical History:    Migraines  Severe back issues - spinal cord stimulator   Asthma  GERD  Depression / Anxiety  PE - 2021 - Dr. Strickland ([place])  Primary ovarian failure        Past Surgical History:    Spinal cord stimulator (2018)  Lap tito  Left Carpal tunnel  Left rotator cuff surgery     All:   Metformin - lactic acidosis  Others - reviewed     Social History:   No tobacco, occ ETOH, once/month, no MJ, no illicits  Junior Director at       Vitals:    02/15/24 1112   BP: 116/80   Pulse: 76   Temp: 96.5 °F (35.8 °C)         Gen: A&ox3, NAD  CV: RR  Abd: incisions healing well, right paramedian incision with some epidermal separation, no erythema, mild red irritation, no purulence. Otherwise incisions look good, nondistended, minimally tender.           39yo female s/p lap RYGB 1/29/24    Doing well overall.  Pain well controlled and improving.     Mild irritation and separation right paramedian incision - daily neosporin and local wound care.    Advance diet per protocol.  Encouraged to go slow and scale back and contact us if having any issues.  Encouraged to focus a bit more on protein and to start logging.     OK to increase physical activity slowly as tolerated.  Encouraged to start exercising on a regular basis. Anticipates return to work tomorrow.     Continue PPI - given long term need for  eliquis, would consider long term PPI, but will discuss more at next visit.     Postop orientation given.   WIll make followup w dietitian and bariatrician.   Followup 1 month or earlier if needed.

## 2024-04-11 ENCOUNTER — OFFICE VISIT (OUTPATIENT)
Dept: FAMILY MEDICINE CLINIC | Facility: CLINIC | Age: 39
End: 2024-04-11
Payer: COMMERCIAL

## 2024-04-11 ENCOUNTER — LAB ENCOUNTER (OUTPATIENT)
Dept: LAB | Age: 39
End: 2024-04-11
Attending: FAMILY MEDICINE
Payer: COMMERCIAL

## 2024-04-11 VITALS
BODY MASS INDEX: 37.83 KG/M2 | DIASTOLIC BLOOD PRESSURE: 78 MMHG | RESPIRATION RATE: 20 BRPM | WEIGHT: 241 LBS | HEART RATE: 98 BPM | SYSTOLIC BLOOD PRESSURE: 120 MMHG | OXYGEN SATURATION: 99 % | HEIGHT: 67 IN

## 2024-04-11 DIAGNOSIS — Z00.00 GENERAL MEDICAL EXAM: ICD-10-CM

## 2024-04-11 DIAGNOSIS — J45.20 MILD INTERMITTENT ASTHMA, UNSPECIFIED WHETHER COMPLICATED (HCC): ICD-10-CM

## 2024-04-11 DIAGNOSIS — R21 RASH: Primary | ICD-10-CM

## 2024-04-11 DIAGNOSIS — G43.019 INTRACTABLE MIGRAINE WITHOUT AURA AND WITHOUT STATUS MIGRAINOSUS: ICD-10-CM

## 2024-04-11 DIAGNOSIS — Z12.31 ENCOUNTER FOR SCREENING MAMMOGRAM FOR HIGH-RISK PATIENT: ICD-10-CM

## 2024-04-11 LAB
ALBUMIN SERPL-MCNC: 4 G/DL (ref 3.4–5)
ALBUMIN/GLOB SERPL: 1 {RATIO} (ref 1–2)
ALP LIVER SERPL-CCNC: 106 U/L
ALT SERPL-CCNC: 43 U/L
ANION GAP SERPL CALC-SCNC: 9 MMOL/L (ref 0–18)
AST SERPL-CCNC: 30 U/L (ref 15–37)
BASOPHILS # BLD AUTO: 0.02 X10(3) UL (ref 0–0.2)
BASOPHILS NFR BLD AUTO: 0.5 %
BILIRUB SERPL-MCNC: 0.6 MG/DL (ref 0.1–2)
BUN BLD-MCNC: 8 MG/DL (ref 9–23)
CALCIUM BLD-MCNC: 10 MG/DL (ref 8.5–10.1)
CHLORIDE SERPL-SCNC: 107 MMOL/L (ref 98–112)
CHOLEST SERPL-MCNC: 179 MG/DL (ref ?–200)
CO2 SERPL-SCNC: 23 MMOL/L (ref 21–32)
CREAT BLD-MCNC: 0.71 MG/DL
DEPRECATED HBV CORE AB SER IA-ACNC: 151 NG/ML
EGFRCR SERPLBLD CKD-EPI 2021: 110 ML/MIN/1.73M2 (ref 60–?)
EOSINOPHIL # BLD AUTO: 0.17 X10(3) UL (ref 0–0.7)
EOSINOPHIL NFR BLD AUTO: 4.1 %
ERYTHROCYTE [DISTWIDTH] IN BLOOD BY AUTOMATED COUNT: 13.2 %
EST. AVERAGE GLUCOSE BLD GHB EST-MCNC: 105 MG/DL (ref 68–126)
FASTING PATIENT LIPID ANSWER: YES
FASTING STATUS PATIENT QL REPORTED: YES
GLOBULIN PLAS-MCNC: 4.2 G/DL (ref 2.8–4.4)
GLUCOSE BLD-MCNC: 95 MG/DL (ref 70–99)
HBA1C MFR BLD: 5.3 % (ref ?–5.7)
HCT VFR BLD AUTO: 40.8 %
HDLC SERPL-MCNC: 34 MG/DL (ref 40–59)
HGB BLD-MCNC: 13.1 G/DL
IMM GRANULOCYTES # BLD AUTO: 0.01 X10(3) UL (ref 0–1)
IMM GRANULOCYTES NFR BLD: 0.2 %
IRON SATN MFR SERPL: 18 %
IRON SERPL-MCNC: 69 UG/DL
LDLC SERPL CALC-MCNC: 113 MG/DL (ref ?–100)
LYMPHOCYTES # BLD AUTO: 1.11 X10(3) UL (ref 1–4)
LYMPHOCYTES NFR BLD AUTO: 27 %
MCH RBC QN AUTO: 28.9 PG (ref 26–34)
MCHC RBC AUTO-ENTMCNC: 32.1 G/DL (ref 31–37)
MCV RBC AUTO: 89.9 FL
MONOCYTES # BLD AUTO: 0.29 X10(3) UL (ref 0.1–1)
MONOCYTES NFR BLD AUTO: 7.1 %
NEUTROPHILS # BLD AUTO: 2.51 X10 (3) UL (ref 1.5–7.7)
NEUTROPHILS # BLD AUTO: 2.51 X10(3) UL (ref 1.5–7.7)
NEUTROPHILS NFR BLD AUTO: 61.1 %
NONHDLC SERPL-MCNC: 145 MG/DL (ref ?–130)
OSMOLALITY SERPL CALC.SUM OF ELEC: 286 MOSM/KG (ref 275–295)
PLATELET # BLD AUTO: 229 10(3)UL (ref 150–450)
POTASSIUM SERPL-SCNC: 3.7 MMOL/L (ref 3.5–5.1)
PROT SERPL-MCNC: 8.2 G/DL (ref 6.4–8.2)
RBC # BLD AUTO: 4.54 X10(6)UL
SODIUM SERPL-SCNC: 139 MMOL/L (ref 136–145)
T4 FREE SERPL-MCNC: 0.9 NG/DL (ref 0.8–1.7)
TIBC SERPL-MCNC: 373 UG/DL (ref 240–450)
TRANSFERRIN SERPL-MCNC: 250 MG/DL (ref 200–360)
TRIGL SERPL-MCNC: 178 MG/DL (ref 30–149)
TSI SER-ACNC: 0.84 MIU/ML (ref 0.36–3.74)
VIT B12 SERPL-MCNC: 391 PG/ML (ref 193–986)
VIT D+METAB SERPL-MCNC: 30.9 NG/ML (ref 30–100)
VLDLC SERPL CALC-MCNC: 31 MG/DL (ref 0–30)
WBC # BLD AUTO: 4.1 X10(3) UL (ref 4–11)

## 2024-04-11 PROCEDURE — 99214 OFFICE O/P EST MOD 30 MIN: CPT | Performed by: FAMILY MEDICINE

## 2024-04-11 PROCEDURE — 84439 ASSAY OF FREE THYROXINE: CPT | Performed by: FAMILY MEDICINE

## 2024-04-11 PROCEDURE — 82607 VITAMIN B-12: CPT | Performed by: FAMILY MEDICINE

## 2024-04-11 PROCEDURE — 80050 GENERAL HEALTH PANEL: CPT | Performed by: FAMILY MEDICINE

## 2024-04-11 PROCEDURE — 83550 IRON BINDING TEST: CPT | Performed by: FAMILY MEDICINE

## 2024-04-11 PROCEDURE — 82306 VITAMIN D 25 HYDROXY: CPT | Performed by: FAMILY MEDICINE

## 2024-04-11 PROCEDURE — 3074F SYST BP LT 130 MM HG: CPT | Performed by: FAMILY MEDICINE

## 2024-04-11 PROCEDURE — 83036 HEMOGLOBIN GLYCOSYLATED A1C: CPT | Performed by: FAMILY MEDICINE

## 2024-04-11 PROCEDURE — 83540 ASSAY OF IRON: CPT | Performed by: FAMILY MEDICINE

## 2024-04-11 PROCEDURE — 3078F DIAST BP <80 MM HG: CPT | Performed by: FAMILY MEDICINE

## 2024-04-11 PROCEDURE — 82728 ASSAY OF FERRITIN: CPT | Performed by: FAMILY MEDICINE

## 2024-04-11 PROCEDURE — 80061 LIPID PANEL: CPT | Performed by: FAMILY MEDICINE

## 2024-04-11 PROCEDURE — 3008F BODY MASS INDEX DOCD: CPT | Performed by: FAMILY MEDICINE

## 2024-04-11 RX ORDER — MONTELUKAST SODIUM 10 MG/1
10 TABLET ORAL DAILY
Qty: 90 TABLET | Refills: 3 | Status: SHIPPED | OUTPATIENT
Start: 2024-04-11 | End: 2025-04-06

## 2024-04-11 RX ORDER — TOPIRAMATE 25 MG/1
50 TABLET ORAL 2 TIMES DAILY
Qty: 120 TABLET | Refills: 2 | Status: SHIPPED | OUTPATIENT
Start: 2024-04-11

## 2024-04-11 RX ORDER — ALBUTEROL SULFATE 90 UG/1
2 AEROSOL, METERED RESPIRATORY (INHALATION) EVERY 6 HOURS PRN
Qty: 3 EACH | Refills: 0 | Status: SHIPPED | OUTPATIENT
Start: 2024-04-11

## 2024-04-11 RX ORDER — UBROGEPANT 100 MG/1
TABLET ORAL
Qty: 10 TABLET | Refills: 2 | Status: SHIPPED | OUTPATIENT
Start: 2024-04-11
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
## 2024-04-12 ENCOUNTER — TELEPHONE (OUTPATIENT)
Dept: SURGERY | Facility: CLINIC | Age: 39
End: 2024-04-12

## 2024-04-12 DIAGNOSIS — Z30.41 ENCOUNTER FOR BIRTH CONTROL PILLS MAINTENANCE: ICD-10-CM

## 2024-04-13 NOTE — TELEPHONE ENCOUNTER
.A refill request was received for:  Requested Prescriptions     Pending Prescriptions Disp Refills    drospirenone-ethinyl estradiol (OCELLA) 3-0.03 MG Oral Tab 28 tablet 2     Sig: Take 1 tablet by mouth daily.       Last refill date:   9/5/2023    Last office visit: 4/11/2024    Follow up due:  Future Appointments   Date Time Provider Department Center   5/1/2024  3:30 PM Santy Blancas MD [id number]   5/23/2024  8:30 AM Rob Kirby MD [facility]INAP[facility]2 [facility] [place]   10/11/2024  7:00 AM Meryl Parson DO [facility] [address]

## 2024-04-14 RX ORDER — DROSPIRENONE AND ETHINYL ESTRADIOL 0.03MG-3MG
1 KIT ORAL DAILY
Qty: 28 TABLET | Refills: 2 | Status: SHIPPED | OUTPATIENT
Start: 2024-04-14

## 2024-04-18 ENCOUNTER — OFFICE VISIT (OUTPATIENT)
Dept: HEMATOLOGY/ONCOLOGY | Facility: HOSPITAL | Age: 39
End: 2024-04-18
Attending: SPECIALIST
Payer: COMMERCIAL

## 2024-04-23 ENCOUNTER — OFFICE VISIT (OUTPATIENT)
Dept: HEMATOLOGY/ONCOLOGY | Facility: HOSPITAL | Age: 39
End: 2024-04-23
Attending: SPECIALIST
Payer: COMMERCIAL

## 2024-04-23 VITALS
RESPIRATION RATE: 16 BRPM | WEIGHT: 228 LBS | DIASTOLIC BLOOD PRESSURE: 77 MMHG | SYSTOLIC BLOOD PRESSURE: 120 MMHG | TEMPERATURE: 97 F | HEART RATE: 90 BPM | OXYGEN SATURATION: 99 % | BODY MASS INDEX: 35.79 KG/M2 | HEIGHT: 67.01 IN

## 2024-04-23 DIAGNOSIS — Z86.711 HISTORY OF PULMONARY EMBOLISM: Primary | ICD-10-CM

## 2024-04-23 DIAGNOSIS — Z79.01 CURRENT USE OF LONG TERM ANTICOAGULATION: ICD-10-CM

## 2024-04-23 PROCEDURE — 99213 OFFICE O/P EST LOW 20 MIN: CPT | Performed by: SPECIALIST
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
## 2024-04-23 NOTE — PROGRESS NOTES
Patient is here for MD f/u for PE and Anemia. Labs drawn on 4/11. Patient is feeling well. No concerns at this time.       Education Record    Learner:  Patient    Disease / Diagnosis: PE and anemia     Barriers / Limitations:  None   Comments:    Method:  Discussion   Comments:    General Topics:  Plan of care reviewed   Comments:    Outcome:  Shows understanding   Comments:

## 2024-07-13 DIAGNOSIS — G43.019 INTRACTABLE MIGRAINE WITHOUT AURA AND WITHOUT STATUS MIGRAINOSUS: ICD-10-CM

## 2024-07-13 RX ORDER — TOPIRAMATE 25 MG/1
50 TABLET ORAL 2 TIMES DAILY
Qty: 120 TABLET | Refills: 0 | Status: SHIPPED | OUTPATIENT
Start: 2024-07-13

## 2024-07-29 ENCOUNTER — TELEPHONE (OUTPATIENT)
Dept: SURGERY | Facility: CLINIC | Age: 39
End: 2024-07-29

## 2024-07-29 NOTE — TELEPHONE ENCOUNTER
Appointment audit/chart review phone call per protocol.   Per records, patient has appointments as follows: Patient has not returned since 02/15/2024 for 1 wk post-op visit.  Surgeon   Dietician   Bariatrician   Requested a call back to inform office if pt has been hospitalized or visited any ED since surgery.   
[Negative] : Heme/Lymph

## 2024-09-25 ENCOUNTER — TELEPHONE (OUTPATIENT)
Dept: SURGERY | Facility: CLINIC | Age: 39
End: 2024-09-25

## (undated) DIAGNOSIS — Z76.89 ENCOUNTER FOR WEIGHT MANAGEMENT: ICD-10-CM

## (undated) DIAGNOSIS — K21.9 GASTROESOPHAGEAL REFLUX DISEASE WITHOUT ESOPHAGITIS: ICD-10-CM

## (undated) DIAGNOSIS — E66.9 OBESITY (BMI 30-39.9): ICD-10-CM

## (undated) DIAGNOSIS — Z51.81 ENCOUNTER FOR THERAPEUTIC DRUG MONITORING: ICD-10-CM

## (undated) DIAGNOSIS — Z86.711 HISTORY OF PULMONARY EMBOLISM: Primary | ICD-10-CM

## (undated) DIAGNOSIS — G43.019 INTRACTABLE MIGRAINE WITHOUT AURA AND WITHOUT STATUS MIGRAINOSUS: ICD-10-CM

## (undated) DIAGNOSIS — Z02.9 ENCOUNTERS FOR ADMINISTRATIVE PURPOSE: ICD-10-CM

## (undated) DEVICE — FLUIDGARD® 160 ANTI-FOG SURGICAL MASK WITH ANTI-GLARE SHIELD: Brand: PRECEPT ®

## (undated) DEVICE — LASSO POLYPECTOMY SNARE: Brand: LASSO

## (undated) DEVICE — GLOVE SURG TRIUMPH SZ 6-1/2

## (undated) DEVICE — SUTURE PERMAHAND SZ 2-0 L30IN 10X30IN TIE

## (undated) DEVICE — SOLUTION IV 1000ML 0.9% NACL PRESERVATIVE

## (undated) DEVICE — SUTURE PERMA- SZ 2-0 L30IN NONABSORBABLE

## (undated) DEVICE — ENDOSCOPY PACK UPPER: Brand: MEDLINE INDUSTRIES, INC.

## (undated) DEVICE — GAUZE SPONGES,12 PLY: Brand: CURITY

## (undated) DEVICE — STERILE POLYISOPRENE POWDER-FREE SURGICAL GLOVES: Brand: PROTEXIS

## (undated) DEVICE — NEBULIZER MICRO MIST W/TEE

## (undated) DEVICE — TROCAR: Brand: KII FIOS FIRST ENTRY

## (undated) DEVICE — DRAPE SHEET LG

## (undated) DEVICE — GAMMEX® PI HYBRID SIZE 7, STERILE POWDER-FREE SURGICAL GLOVE, POLYISOPRENE AND NEOPRENE BLEND: Brand: GAMMEX

## (undated) DEVICE — VIOLET BRAIDED (POLYGLACTIN 910), SYNTHETIC ABSORBABLE SUTURE: Brand: COATED VICRYL

## (undated) DEVICE — [HIGH FLOW INSUFFLATOR,  DO NOT USE IF PACKAGE IS DAMAGED,  KEEP DRY,  KEEP AWAY FROM SUNLIGHT,  PROTECT FROM HEAT AND RADIOACTIVE SOURCES.]: Brand: PNEUMOSURE

## (undated) DEVICE — PROXIMATE SKIN STAPLERS (35 WIDE) CONTAINS 35 STAINLESS STEEL STAPLES (FIXED HEAD): Brand: PROXIMATE

## (undated) DEVICE — ECHELON FLEX POWERED PLUS LONG ARTICULATING ENDOSCOPIC LINEAR CUTTER, 60MM: Brand: ECHELON FLEX

## (undated) DEVICE — Device: Brand: DEFENDO VALVE AND CONNECTOR KIT

## (undated) DEVICE — GIJAW SINGLE-USE BIOPSY FORCEPS WITH NEEDLE: Brand: GIJAW

## (undated) DEVICE — 10FT COMBINED O2 DELIVERY/CO2 MONITORING. FILTER WITH MICROSTREAM TYPE LUER: Brand: DUAL ADULT NASAL CANNULA

## (undated) DEVICE — STERIS KITS

## (undated) DEVICE — ISOPROPYL ALCOHOL 70% 4OZ BTL

## (undated) DEVICE — 3M™ RED DOT™ MONITORING ELECTRODE WITH FOAM TAPE AND STICKY GEL 2570-3, 3/BAG, 200/CASE, 54/PLT: Brand: RED DOT™

## (undated) DEVICE — LENS FRAMES DISP EYEWEAR

## (undated) DEVICE — DISPOSABLE TOURNIQUET CUFF SINGLE BLADDER, DUAL PORT AND QUICK CONNECT CONNECTOR: Brand: COLOR CUFF

## (undated) DEVICE — SLEEVE KENDALL SCD EXPRESS MED

## (undated) DEVICE — Device: Brand: SUTURE PASSOR PRO

## (undated) DEVICE — MICRO KOVER: Brand: UNBRANDED

## (undated) DEVICE — MINI-BLADE®: Brand: BEAVER®

## (undated) DEVICE — SUT ETHILON 4-0 PS-2 1667H

## (undated) DEVICE — TIGERTAIL 5F FLXTIP 70CM

## (undated) DEVICE — SUTURE PDS II 1 CT-1

## (undated) DEVICE — TISSUE RETRIEVAL SYSTEM: Brand: INZII RETRIEVAL SYSTEM

## (undated) DEVICE — ADHESIVE SKIN TOP FOR WND CLSR DERMBND ADV

## (undated) DEVICE — UPPER EXTREMITY CDS-LF: Brand: MEDLINE INDUSTRIES, INC.

## (undated) DEVICE — NON-ADHERENT STRIPS,OIL EMULSION: Brand: CURITY

## (undated) DEVICE — POM MASK W/CO2

## (undated) DEVICE — EYE SPEARS: Brand: WECK-CEL

## (undated) DEVICE — ZIPWIRE GUIDEWIRE .038X150 STR

## (undated) DEVICE — SYRINGE 10ML SLIP TIP

## (undated) DEVICE — HYBRID TUBING/CAP SET FOR OLYMPUS® SCOPES: Brand: ERBE

## (undated) DEVICE — ENDOSCOPY PORT CONNECTOR FOR OLYMPUS® SCOPES: Brand: ERBE

## (undated) DEVICE — LAP CHOLE: Brand: MEDLINE INDUSTRIES, INC.

## (undated) DEVICE — BOWLS UTILITY 16OZ

## (undated) DEVICE — TROCARS: Brand: KII® BALLOON BLUNT TIP SYSTEM

## (undated) DEVICE — TRAP SPECIMEN MUCOUS 70 CUBIC CENTIMETER POLYURETHANE STERILE NOT MADE WITH NATURAL RUBBER LATEX MEDICHOICE: Brand: MEDICHOICE

## (undated) DEVICE — FORCEP RADIAL JAW 1.8/100

## (undated) DEVICE — DISPOSABLE BIPOLAR FORCEPS 4" (10.2CM) JEWELERS, STRAIGHT 0.4MM TIP AND 12 FT. (3.6M) CABLE: Brand: KIRWAN

## (undated) DEVICE — ENDOPATH ECHELON ENDOSCOPIC LINEAR CUTTER RELOADS, WHITE, 60MM: Brand: ECHELON ENDOPATH

## (undated) DEVICE — SOLUTION  .9 1000ML BTL

## (undated) DEVICE — UNDYED BRAIDED (POLYGLACTIN 910), SYNTHETIC ABSORBABLE SUTURE: Brand: COATED VICRYL

## (undated) DEVICE — HARMONIC ACE +7 LAPAROSCOPIC SHEARS ADVANCED HEMOSTASIS 5MM DIAMETER 45CM SHAFT LENGTH  FOR USE WITH GRAY HAND PIECE ONLY: Brand: HARMONIC ACE

## (undated) DEVICE — SUT MONOCRYL 4-0 PS-2 Y496G

## (undated) DEVICE — MEDI-VAC NON-CONDUCTIVE SUCTION TUBING: Brand: CARDINAL HEALTH

## (undated) DEVICE — STERILE POLYISOPRENE POWDER-FREE SURGICAL GLOVES WITH EMOLLIENT COATING: Brand: PROTEXIS

## (undated) DEVICE — LENS PLASTIC DISP EYEWEAR

## (undated) DEVICE — TRAP POLYP W/ 2 SPEC TY CLR MAGNIFYING WIND

## (undated) DEVICE — MASK ISOLATION

## (undated) DEVICE — KIT VLV 5 PC AIR H2O SUCT BX ENDOGATOR CONN

## (undated) DEVICE — 3M™ RED DOT™ MONITORING ELECTRODE WITH FOAM TAPE AND STICKY GEL, 50/BAG, 20/CASE, 72/PLT 2570: Brand: RED DOT™

## (undated) DEVICE — CYSTO CDS-LF: Brand: MEDLINE INDUSTRIES, INC.

## (undated) DEVICE — FILTERLINE NASAL ADULT O2/CO2

## (undated) DEVICE — CIRCULAR STAPLER WITH DST SERIES TECHNOLOGY: Brand: EEA

## (undated) DEVICE — 3M™ STERI-DRAPE™ INSTRUMENT POUCH 1018L: Brand: STERI-DRAPE™

## (undated) DEVICE — SCD SLEEVE KNEE HI BLEND

## (undated) DEVICE — SUTURE SILK 1-0 SA87G

## (undated) DEVICE — ENDOPATH ECHELON ENDOSCOPIC LINEAR CUTTER RELOADS, BLUE, 60MM: Brand: ECHELON ENDOPATH

## (undated) DEVICE — MAT AIR TRNSF W34XL78IN NYL POLYPR FBR

## (undated) DEVICE — SINGLE USE BIOPSY VALVE MAJ-210: Brand: SINGLE USE BIOPSY VALVE (STERILE)

## (undated) DEVICE — PADDING CAST COTTON STER 3

## (undated) DEVICE — SINGLE USE SUCTION VALVE MAJ-209: Brand: SINGLE USE SUCTION VALVE (STERILE)

## (undated) DEVICE — MEDI-VAC SUCTION HANDLE REGULAR CAPACITY: Brand: CARDINAL HEALTH

## (undated) DEVICE — GOWN,SIRUS,FABRIC-REINFORCED,X-LARGE: Brand: MEDLINE

## (undated) DEVICE — Device

## (undated) DEVICE — 1200CC GUARDIAN II: Brand: GUARDIAN

## (undated) DEVICE — BITEBLOCK ENDOSCP 60FR MAXI STRP

## (undated) DEVICE — 60 ML SYRINGE REGULAR TIP: Brand: MONOJECT

## (undated) DEVICE — SOL  .9 3000ML

## (undated) DEVICE — Device: Brand: JELCO

## (undated) DEVICE — WOUND RETRACTOR AND PROTECTOR: Brand: ALEXIS WOUND PROTECTOR-RETRACTOR

## (undated) NOTE — LETTER
Abbi Doshi [address]  [address] [facility]  Authorization for Surgical Operation and Procedure       Date:___________                                                                                                         Time:__________ and/or blood products. The following are some, but not all, of the potential risks that can occur: fever and allergic reactions, hemolytic reactions, transmission of diseases such as Hepatitis, AIDS and Cytomegalovirus (CMV) and fluid overload.   In the ev authorized to consent on my behalf). The surgeon or my attending physician will determine when the applicable recovery period ends for purposes of reinstating the DNAR order.   10. Patients having a sterilization procedure: I understand that if the procedur patient asking for anesthesia services, I agree to:  a. Allow the anesthesiologist (anesthesia doctor) to give me medicine and do additional procedures as necessary.  Some examples are: Starting or using an “IV” to give me medicine, fluids or blood during m rhythms and nerve injury. 7. Regional Anesthesia (“spinal”, “epidural”, & “nerve blocks”): I understand that rare but potential complications include headache, bleeding, infection, seizure, irregular heart rhythms, and nerve injury.     I can change my mi

## (undated) NOTE — LETTER
[facility] - [place] FOR BLOOD OR BLOOD COMPONENTS      In the course of your treatment, it may become necessary to administer a transfusion of blood or blood components. This form provides basic information concerning this procedure and, if signed by you, authorizes its performance by qualified medical personnel. DESCRIPTION OF PROCEDURE:  Blood is introduced into one of your veins, commonly in the arm, using a sterilized disposable needle. The amount of blood transfused, and whether the transfusion will be of blood or blood components is a judgment the physician will make based on your particular needs. RISKS:  The transfusion is a common procedure of low risk. MINOR AND TEMPORARY REACTIONS ARE NOT UNCOMMON, including a slight bruise, swelling or local reaction in the area where the needle pierces your skin, or a non-serious reaction to the transfused material itself, including headache, fever or a mild skin reaction, such as rash. Serious reactions are possible, though very unlikely and include severe allergic reaction (shock)  and destruction (hemolysis) of transfused blood cells. Infectious diseases which are known to be transmitted by blood transfusion include CERTAIN TYPES OF VIRAL HEPATITIS, a viral infection of the liver, HUMAN IMMUNODEFICIENCY VIRUS (HIV-1,2) infection, a viral infection known to cause ACQUIRED IMMUNODEFICIENCY SYNDROME (AIDS) AS WELL AS CERTAIN OTHER BACTERIAL, VIRAL AND PARASITIC DISEASES. While a minimal risk of acquiring an infectious disease from transfused blood exists, in accordance with Federal and State law all due care has been taken in donor selection and testing to avoid transmission of disease. ALTERNATIVES:  If loss of blood poses serious threats in the course of your treatment, THERE IS NO EFFECTIVE ALTERNATIVE TO BLOOD TRANSFUSION.  However, if you have any further questions on this matter, your physician will fully explain the alternatives to you if it has not already been done. [facility], have read/had read to me the above. I understand the matters bearing on the decision whether or not to authorize a transfusion of blood or blood components. I have no questions which have not been answered to my full satisfaction.  I hereby consent to such transfusion as  my physician may deem necessary or advisable in the course of my treatment.        _______________   __________________________________________________  Date     Signature of Patient, Parent or Legal Guardian      ([place] [facility])      __________________________________________  Witness to Signature (title or relationship to patient)    Patient Name: Linda Jacques     : 10/15/1985                 Printed: 2022     Medical Record #: [id number]                    Page 1 of 1

## (undated) NOTE — LETTER
Abbi Doshi [address]  [address] [facility] [address], [address] [facility]  Authorization for Surgical Operation and Procedure     Date:___________                                                                                                         Time:__________ following are some, but not all, of the potential risks that can occur: fever and allergic reactions, hemolytic reactions, transmission of diseases such as Hepatitis, AIDS and Cytomegalovirus (CMV) and fluid overload.   In the event that I wish to have an a The surgeon or my attending physician will determine when the applicable recovery period ends for purposes of reinstating the DNAR order.   10. Patients having a sterilization procedure: I understand that if the procedure is successful the results will be p anesthesia services, I agree to:  a. Allow the anesthesiologist (anesthesia doctor) to give me medicine and do additional procedures as necessary.  Some examples are: Starting or using an “IV” to give me medicine, fluids or blood during my procedure, and ha injury. 7. Regional Anesthesia (“spinal”, “epidural”, & “nerve blocks”): I understand that rare but potential complications include headache, bleeding, infection, seizure, irregular heart rhythms, and nerve injury.     I can change my mind about having an

## (undated) NOTE — LETTER
Date: 1/12/2022    Patient Name: Joyce Zamudio          To Whom it may concern: This letter has been written at the patient's request. The above patient was seen at the [facility] for treatment of a medical condition.     Pt und

## (undated) NOTE — LETTER
03/17/21    To whom it may concern,      It has been determined under clinical and psychological observation that Victor M Vasquez be accompanied by a service animal. This conclusion has been determined for the stability and benefits a support animal will bring to

## (undated) NOTE — ED AVS SNAPSHOT
[facility] Emergency Department    [place]  [address] [address]    Phone:  [phone]    Fax:  [address]   MRN: [id number]    Department:  [facility] Emergency Department   Date of Visit: IF THERE IS ANY CHANGE OR WORSENING OF YOUR CONDITION, CALL YOUR PRIMARY CARE PHYSICIAN AT ONCE OR RETURN IMMEDIATELY TO THE EMERGENCY DEPARTMENT.     If you have been prescribed any medication(s), please fill your prescription right away and begin taking t

## (undated) NOTE — MR AVS SNAPSHOT
[facility]  [address] [facility] [address]  [facility] [address]  [phone]               Thank you for choosing us for your health care visit with Ethan Mcfadden MD.  We are glad to serve you and happy to provide you with this summary of your visit.  Please help us to ensure we have accurate records.  If you find anything that needs to be changed, please let our staff know as soon as possible.          After Visit Summary        Provider Location    1/11/2024 Ethan Mcfadden MD [facility]      Your Appointments    Jan 22, 2024  7:00 PM  MRI CERVICAL SPINE with  MR RM2 (1.5T WIDE)  [facility] ([facility]) [address] [facility] [address]  [phone]   You should bring the remote control for any stimulator to the appointment. Please be sure that it is fully charged and if applicable, be sure the implant is fully charged.    Please arrive 30 minutes prior to your scheduled appointment time.  You will need time to change your clothes, fill out screening forms, use the restroom, and may need an IV if your exam requires contrast.  If you arrive too late, your appointment may need to be rescheduled.    IF YOU REQUIRE ORAL SEDATION FOR YOUR MRI: Your physician is responsible for giving you a prescription for oral medication which you would fill at your local pharmacy. If you will be taking oral sedation, you must bring a  who will drive you home (the  does not necessarily have to stay throughout the procedure).        Today's Orders     Comp Metabolic Panel (14)   Complete by:  Jan 11, 2024 (Approximate)      Assoc Dx: Morbid obesity with BMI of 40.0-44.9, adult (HCC) [E66.01, Z68.41]        Hemoglobin A1C   Complete by:  Jan 11, 2024 (Approximate)      Assoc Dx: Morbid obesity with BMI of 40.0-44.9, adult (HCC) [E66.01, Z68.41]        CBC, Platelet; No Differential   Complete by:  Jan 11, 2024 (Approximate)      Assoc Dx: Morbid obesity with  BMI of 40.0-44.9, adult ([facility]) [E66.01, Z68.41]          Reason for Today's Visit     Follow - Up    Pre-Op Exam      Medical Issues Discussed Today    Morbid obesity with BMI of 40.0-44.9, adult     Instructions and Information about Your Health    None     Allergies as of Jan 11, 2024     Dilaudid [hydromorphone] HALLUCINATION    Nightmares, agitation     Haloperidol HALLUCINATION    Nightmares, agitation      Shellfish Allergy ANAPHYLAXIS    epiglottitis    Shellfish-derived Products ANAPHYLAXIS    Venofer [iron Sucrose] Tightness in Chest    Tramadol NAUSEA AND VOMITING, OTHER (SEE COMMENTS)    Migraine    Bupropion OTHER (SEE COMMENTS)    Moxifloxacin RASH, OTHER (SEE COMMENTS)    Seasonal Runny nose, OTHER (SEE COMMENTS)    Sinus congestion    Sumatriptan NAUSEA AND VOMITING    Wellbutrin Xl [budeprion Xl] RASH    Adhesive Tape RASH    Paper tape is ok                Today's Vital Signs  Most recent update: 1/11/2024  7:54 AM    BP   116/80    Pulse   96    Height   5' 7\" (1.702 m)    Weight   265 lb (120.2 kg)             Current Medications          Accurate as of January 11, 2024  9:09 AM. Always use your most recent med list.            * albuterol 108 (90 Base) MCG/ACT Aers  Inhale 2 puffs into the lungs every 6 (six) hours as needed for Wheezing.  Commonly known as: ProAir HFA        * albuterol (2.5 MG/3ML) 0.083% Nebu  Take 3 mL (2.5 mg total) by nebulization every 4 (four) hours as needed for Wheezing.  Commonly known as: Ventolin        apixaban 2.5 MG Tabs  Take 1 tablet (2.5 mg total) by mouth 2 (two) times daily.  Commonly known as: Eliquis        aprepitant 40 MG Caps  Take 1 capsule (40 mg total) by mouth daily.  Commonly known as: EMEND        cetirizine 10 MG Tabs  Take 1 tablet (10 mg total) by mouth in the morning, at noon, and at bedtime.  Commonly known as: ZyrTEC        clonazePAM 0.5 MG Tabs  Take 1 to 2 tablets (0.5mg to 1mg) by mouth twice daily  Commonly known as: KlonoPIN         drospirenone-ethinyl estradiol 3-0.03 MG Tabs  Take 1 tablet by mouth daily.  Commonly known as: Ocella        FLUoxetine HCl 40 MG Caps  Take 1 capsule (40 mg total) by mouth daily.  Commonly known as: PROZAC        fluticasone furoate-vilanterol 200-25 MCG/ACT Aepb  Inhale 1 puff into the lungs daily.  Commonly known as: Breo Ellipta        Hydroquinone 4 % Crea  Apply topically 2 (two) times daily as needed.        prochlorperazine 10 mg tablet  Take 1 tablet (10 mg total) by mouth every 6 (six) hours as needed for Nausea.  Commonly known as: Compazine        QUEtiapine 25 MG Tabs  Take 1-2 tablets (25-50 mg total) by mouth nightly.  Commonly known as: SEROquel        topiramate 25 MG Tabs  Take 2 tablets (50 mg total) by mouth 2 (two) times daily.  Commonly known as: TopaMAX        Ubrelvy 100 MG Tabs  Generic drug: ubrogepant  Take 100 mg by mout may repeat once 2 hours later in same day max 2 per 24 hours.            * This list has 2 medication(s) that are the same as other medications prescribed for you. Read the directions carefully, and ask your doctor or other care provider to review them with you.               Where to Get Your Medications      These medications were sent to [id number] - [place], [place] - [address] [phone], [phone]  [address], [place] [address]    Phone: [phone]   aprepitant 40 MG Caps           MyChart    Visit [employer]  You can access your MyChart to more actively manage your health care and view more details from this visit by going to [email or web address].  If you've recently had a stay at the Hospital you can access your discharge instructions in [email or web address] by going to Visits < Admission Summaries. If you've been to the Emergency Department or your doctor's office, you can view your past visit information in [email or web address] by going to Visits < Visit Summaries.   [email or web address] questions?  Call [phone] for help.  [email or web address] is NOT to be used for urgent  needs.    For medical emergencies, dial 911.          Visit [facility] online at  [email or web address]

## (undated) NOTE — LETTER
Don Carlton 5SW/SE  [address] [address]  [address]: [phone]          09/1/2021    To whom it may concern,    Patient has been admitted to [address] 08/29/2021 - 09/01/2021. Patient may return to work 09/04/21.  Ple

## (undated) NOTE — ED AVS SNAPSHOT
Katey Marie   MRN: [id number]    Department:  [facility] Emergency Department   Date of Visit:  4/9/2019           Disclosure     Insurance plans vary and the physician(s) referred by the ER may not be covered by your plan.  Please contact yo tell this physician (or your personal doctor if your instructions are to return to your personal doctor) about any new or lasting problems. The primary care or specialist physician will see patients referred from the [facility] Emergency Department.  Bassem Marrero

## (undated) NOTE — LETTER
08/20/20        Cyril Shafer  [address]      Dear Mouna Botello,    Our records indicate that you have outstanding lab work and or testing that was ordered for you and has not yet been completed:  Orders Placed This Enc

## (undated) NOTE — MR AVS SNAPSHOT
After Visit Summary   5/24/2022    Marco Allison   MRN: [id number]           Visit Information     Date & Time  5/24/2022  2:00 PM Provider  Tal Gauthier, [address] Department [facility] Neurodiagnostics Dept. Phone  [phone] Were     LMP   05/02/2022             Allergies as of 5/24/2022  Review status set to Review Complete on 5/23/2022       Noted Reaction Type Reactions    Dilaudid [hydromorphone] 11/02/2021   Side Effect HALLUCINATION    Nightmares, agitation     Haloperidol 11/02/2021   Side Effect HALLUCINATION    Nightmares, agitation      Shellfish Allergy 12/22/2020    ANAPHYLAXIS    epiglottitis    Shellfish-derived Products 01/03/2016    ANAPHYLAXIS    Venofer [iron Sucrose] 04/05/2022    Tightness in Chest    Tramadol 01/03/2016    NAUSEA AND VOMITING, OTHER (SEE COMMENTS)    Migraine    Avelox [moxifloxacin] 05/01/2017    RASH    Bupropion 07/10/2020    OTHER (SEE COMMENTS)    Seasonal 01/03/2016    Runny nose, OTHER (SEE COMMENTS)    Sinus congestion    Sumatriptan 07/11/2017    NAUSEA AND VOMITING    Wellbutrin Xl [budeprion Xl] 07/11/2017    RASH      Your Current Medications        Dosage    Phentermine HCl 37.5 MG Oral Tab Take 1 tablet (37.5 mg total) by mouth every morning before breakfast.    drospirenone-ethinyl estradiol (OCELLA) 3-0.03 MG Oral Tab Take 1 tablet by mouth daily. QUEtiapine 25 MG Oral Tab Take 1 tablet (25 mg total) by mouth nightly. traZODone 50 MG Oral Tab Take 1 tablet (50 mg total) by mouth nightly as needed for Sleep. This is a decrease    apixaban (ELIQUIS) 2.5 MG Oral Tab Take 1 tablet (2.5 mg total) by mouth 2 (two) times daily. ondansetron (ZOFRAN) 4 mg tablet Take 1 tablet (4 mg total) by mouth every 8 (eight) hours as needed for Nausea. OMEPRAZOLE 40 MG Oral Capsule Delayed Release Take 1 capsule by mouth once daily    naproxen 220 MG Oral Tab Take by mouth 2 (two) times daily as needed.     docusate sodium 100 MG Oral Cap Take 100 mg by mouth 2 (two) times daily as needed for constipation. Magnesium Hydroxide (MILK OF MAGNESIA OR) Take by mouth as needed. Plecanatide (TRULANCE) 3 MG Oral Tab Take 3 mg by mouth daily. Hydroquinone 4 % External Cream Apply topically 2 (two) times daily as needed. dicyclomine 10 MG Oral Cap Take 10 mg by mouth 3 (three) times daily as needed. ketotifen 0.025 % Ophthalmic Solution Place 1 drop into both eyes 2 (two) times daily as needed. FLUoxetine 20 MG Oral Cap Take 3 capsules (60 mg total) by mouth every morning. prazosin 1 MG Oral Cap Take 2 capsules (2 mg total) by mouth nightly. This is an increase    clonazePAM 0.5 MG Oral Tab Take 1 tablet (0.5 mg total) by mouth 2 (two) times daily as needed for Anxiety. albuterol (PROAIR HFA) 108 (90 Base) MCG/ACT Inhalation Aero Soln Inhale 2 puffs into the lungs every 6 (six) hours as needed for Wheezing. albuterol (2.5 MG/3ML) 0.083% Inhalation Nebu Soln Take 3 mL (2.5 mg total) by nebulization every 4 (four) hours as needed for Wheezing. BIOTIN OR Take 1 tablet by mouth 2 (two) times a day. topiramate (TOPAMAX) 50 MG Oral Tab Take 1 tablet (50 mg total) by mouth 2 (two) times daily. Take in am and 4 pm    ubrogepant (UBRELVY) 100 MG Oral Tab Take 100 mg by mout may repeat once 2 hours later in same day max 2 per 24 hours. Polyethylene Glycol 3350 17 g Oral Powd Pack Take 17 g by mouth daily. Fluticasone Furoate-Vilanterol 200-25 MCG/INH Inhalation Aerosol Powder, Breath Activated Inhale 1 puff into the lungs daily. Olopatadine HCl 0.1 % Ophthalmic Solution Place 1 drop into both eyes as needed. cetirizine 10 MG Oral Tab Take 10 mg by mouth in the morning, at noon, and at bedtime.       Diagnoses for This Visit    Left carpal tunnel syndrome   [386054]    Laceration of left upper extremity, initial encounter   [5950219]    Pain of left thumb   [770935]             We Ordered the Following     Normal Orders This Visit    EMG [NEU5 CUSTOM]                 Did you know that [facility] primary care physicians now offer Video Visits through [address] for adult patients for a variety of conditions such as allergies, back pain and cold symptoms? Skip the drive and waiting room and online chat with a doctor face-to-face using your web-cam enabled computer or mobile device wherever you are. Video Visits cost $50 and can be paid hassle-free using a credit, debit, or health savings card. Not active on [employer])? Ask us how to get signed up today! If you receive a survey from [employer], please take a few minutes to complete it and provide feedback. We strive to deliver the best patient experience and are looking for ways to make improvements. Your feedback will help us do so. For more information on [employer], please visit [email or web address]           No text in SmartText           No text in SmartText

## (undated) NOTE — LETTER
Abbi Doshi [address] [place] [address]  Authorization for Surgical Operation and Procedure     Date:___________                                                                                                         Time:__________ following are some, but not all, of the potential risks that can occur: fever and allergic reactions, hemolytic reactions, transmission of diseases such as Hepatitis, AIDS and Cytomegalovirus (CMV) and fluid overload.   In the event that I wish to have an a The surgeon or my attending physician will determine when the applicable recovery period ends for purposes of reinstating the DNAR order.   10. Patients having a sterilization procedure: I understand that if the procedure is successful the results will be p anesthesia services, I agree to:  a. Allow the anesthesiologist (anesthesia doctor) to give me medicine and do additional procedures as necessary.  Some examples are: Starting or using an “IV” to give me medicine, fluids or blood during my procedure, and ha injury. 7. Regional Anesthesia (“spinal”, “epidural”, & “nerve blocks”): I understand that rare but potential complications include headache, bleeding, infection, seizure, irregular heart rhythms, and nerve injury.     I can change my mind about having an

## (undated) NOTE — LETTER
ASTHMA ACTION PLAN for Ger Hanson     : 10/15/1985     Date: 2020  Provider:  Vielka Fair NP  Phone for doctor or clinic: [facility] 2, [facility] 2, [address]  [phone]

## (undated) NOTE — LETTER
Your patient was recently seen at the [facility] for a hospital follow-up visit. The visit note is attached. Please contact the clinic with any questions at [phone].     Thank you,  LOIDA Rodriguez

## (undated) NOTE — LETTER
Pre-Procedure  Swati Funk, DO        I acknowledge that I have been informed of the risk involved by refusing the urine pregnancy est on [facility]. I, thereby, release [facility], its personnel, and the attending physician from all responsibility for any ill effects which may result from this action.                             DATE _______________  TIME __________ AM/PM             _________________________________________________                                                                                                                                              Patient Signature        ____________________________________________________                                      Relationship          DATE _______________  TIME __________ AM/PM            __________________________________________________                                                                                                                                                       Witness Signature        Patient Name: [facility]     : 10/15/1985    Page 1      Printed: 2023       Medical Record #: [id number]   Revised (03)

## (undated) NOTE — LETTER
ASTHMA ACTION PLAN for Lashon London     : 10/15/1985     Date: 24  Doctor:  Meryl Parson DO  Phone for doctor or clinic: [facility], [address] [place] [id number] [phone]      ACT Score: 15    ACT Goal: 20 or greater    Call your provider if you require your rescue/quick reliever medication more than 2-3 times in a 24 hour period.    If you require your rescue inhaler/medication more than 2-3 times weekly, your asthma may not be under proper control and you should seek medical attention.    *Quick Relievers are Xopenex and Albuterol*    You can use the colors of a traffic light to help learn about your asthma medicines.  Therapy Range       1. Green - Go! % of Personal Best Peak Flow   Use controller medicine.   Breathing is good  No cough or wheeze  Can work and play Medicine How much to take When to take it    Medications       Leukotriene Modulators Instructions     montelukast (SINGULAIR) 10 MG Oral Tab Take 1 tablet (10 mg total) by mouth daily.       Sympathomimetics Instructions     albuterol (PROAIR HFA) 108 (90 Base) MCG/ACT Inhalation Aero Soln Inhale 2 puffs into the lungs every 6 (six) hours as needed for Wheezing.     albuterol (2.5 MG/3ML) 0.083% Inhalation Nebu Soln Take 3 mL (2.5 mg total) by nebulization every 4 (four) hours as needed for Wheezing.                    2. Yellow - Caution. 50-79% Personal Best Peak Flow  Use reliever medicine to keep an asthma attack from getting bad.   Cough  Quick Relievers  Wheezing  Tight Chest  Wake up at night Medicine How much to take When to take it    If symptoms are not improving in 24-48 hrs, call office for further instructions  Medications       Leukotriene Modulators Instructions     montelukast (SINGULAIR) 10 MG Oral Tab Take 1 tablet (10 mg total) by mouth daily.       Sympathomimetics Instructions     albuterol (PROAIR HFA) 108 (90 Base) MCG/ACT Inhalation  Aero Soln Inhale 2 puffs into the lungs every 6 (six) hours as needed for Wheezing.     albuterol (2.5 MG/3ML) 0.083% Inhalation Nebu Soln Take 3 mL (2.5 mg total) by nebulization every 4 (four) hours as needed for Wheezing.                    3. Red - Stop! Danger! <50% Personal Best Peak Flow  Continue Controller Medications But ADD:   Medicine not helping  Breathing is hard and fast  Nose opens wide  Can't walk  Ribs show  Can't talk well Medicine How much to take When to take it    If your symptoms do not improve in ONE hour -  go to the emergency room or call 911 immediately! If symptoms improve, call office for appointment immediately.    Albuterol inhaler 2 puffs every 20 minutes for three treatments       Don't forget:  Rinse mouth after using inhaler  Use spacer for inhaler  Remember to get your Flu vaccine every fall!    [x] Asthma Action Plan reviewed with the caregiver and patient, and a copy of the plan was given to the patient/caregiver.   [] Asthma Action Plan reviewed with the caregiver and patient on the phone, and copy mailed to patient/caregiver or sent via [employer].     Signatures:   Provider  Meryl Parson, DO Patient  Lashno [place] Caretaker

## (undated) NOTE — Clinical Note
Seven Caraballo,  Your patient reacted to Venofer and was sent to the ED via EMS.   Abner Clement NP

## (undated) NOTE — LETTER
Abbi Doshi 89  [address] [facility] [address], [address] [facility]  Authorization for Surgical Operation and Procedure       Date:___________                                                                                                         Time:__________ fever and allergic reactions, hemolytic reactions, transmission of diseases such as Hepatitis, AIDS and Cytomegalovirus (CMV) and fluid overload. In the event that I wish to have an autologous transfusion of my own blood, or a directed donor transfusion. applicable recovery period ends for purposes of reinstating the DNAR order.   10. Patients having a sterilization procedure: I understand that if the procedure is successful the results will be permanent and it will therefore be impossible for me to insemin to give me medicine and do additional procedures as necessary. Some examples are: Starting or using an “IV” to give me medicine, fluids or blood during my procedure, and having a breathing tube placed to help me breathe when I’m asleep (intubation).  In the that rare but potential complications include headache, bleeding, infection, seizure, irregular heart rhythms, and nerve injury.     I can change my mind about having anesthesia services at any time before I get the medicine.    ____________________________

## (undated) NOTE — ED AVS SNAPSHOT
[facility] Emergency Department    [place]  [address] [address]    Phone:  [phone]    Fax:  [address] [place] [address]   MRN: [id number]    Department:  [facility] Emergency Department   Date of Visit: Insurance plans vary and the physician(s) referred by the ER may not be covered by your plan. Please contact your insurance company to determine coverage for follow-up care and referrals.     [facility]  Main [phone]  Pediatric If the emergency physician has read X-rays, these will be re-interpreted by a radiologist.  If there is a significant change in your reading, you will be contacted. Please make sure we have your correct phone number before you leave.  After you leave, you s and ask to get set up for an insurance coverage that is in-network with [facility]. [employer]     Sign up for [employer], your secure online medical record.   [employer] will allow you to access patient instructions from your recent visit,  view